# Patient Record
Sex: MALE | Race: BLACK OR AFRICAN AMERICAN | NOT HISPANIC OR LATINO | URBAN - METROPOLITAN AREA
[De-identification: names, ages, dates, MRNs, and addresses within clinical notes are randomized per-mention and may not be internally consistent; named-entity substitution may affect disease eponyms.]

---

## 2019-08-03 ENCOUNTER — INPATIENT (INPATIENT)
Facility: HOSPITAL | Age: 54
LOS: 33 days | Discharge: HOME | End: 2019-09-06
Attending: LEGAL MEDICINE | Admitting: LEGAL MEDICINE
Payer: MEDICAID

## 2019-08-03 VITALS
OXYGEN SATURATION: 96 % | SYSTOLIC BLOOD PRESSURE: 145 MMHG | TEMPERATURE: 98 F | HEART RATE: 85 BPM | RESPIRATION RATE: 16 BRPM | DIASTOLIC BLOOD PRESSURE: 94 MMHG

## 2019-08-03 NOTE — ED PROVIDER NOTE - MUSCULOSKELETAL MINIMAL EXAM
+diffuse back tenderness. L5-S1 weakness. Plantar flexion weakness. Sensations intact. 2+ pulses b/l.

## 2019-08-03 NOTE — ED ADULT TRIAGE NOTE - CHIEF COMPLAINT QUOTE
Pt BIBEMS [facility] from Home, c./o Lower Back pain for 5 days radiates to  Right leg, worsening. Dx with Sciatica, not on any pain medication. Taking OTC w/o relief. PMHx HTN on Vasotec

## 2019-08-03 NOTE — ED PROVIDER NOTE - OBJECTIVE STATEMENT
55 y/o M p/w back pain. Pt has a history of hypertension and chronic sciatica pain, now worsened over the last 3 months, more so over the last 3 days. Pt states pain in localized to lower back and radiates down R leg. Pt reports some residual R foot weakness in the past. Denies any bowel incontinence, urinary retention , erectile dysfunction, fever, chills, or acute weakness. Pt has been using ibuprofen 800 w/ minimal effect (Last used yesterday).

## 2019-08-03 NOTE — ED PROVIDER NOTE - CHPI ED SYMPTOMS NEG
No erectile dysfunction, no fever, no chills, no acute weakness/no bladder dysfunction/no bowel dysfunction

## 2019-08-03 NOTE — ED PROVIDER NOTE - PHYSICAL EXAMINATION
Neuro exam: +diffuse LBP, sensory intact, decrease rt plant flexion (pt states chronic); Sphincter tone/sensory  normal (Chaperone: ED Tech Aragon); reflexes +2 bilateral

## 2019-08-03 NOTE — ED PROVIDER NOTE - PROGRESS NOTE DETAILS
Bri: patient reports only, slight improvement. Lab tests pending, additional analgesics to be given and patient reevaluated. Patient signed out to overnight team. Patient advised. AK: Still has severe pain, unwilling to walk due to pain. WIll admit for pain control. Patient informed that he is being admitted for pain control and may not get an MRI inpatient. Patient understands and wants to be admitted.

## 2019-08-04 DIAGNOSIS — Z98.890 OTHER SPECIFIED POSTPROCEDURAL STATES: Chronic | ICD-10-CM

## 2019-08-04 DIAGNOSIS — F11.20 OPIOID DEPENDENCE, UNCOMPLICATED: ICD-10-CM

## 2019-08-04 DIAGNOSIS — E44.0 MODERATE PROTEIN-CALORIE MALNUTRITION: ICD-10-CM

## 2019-08-04 DIAGNOSIS — M54.30 SCIATICA, UNSPECIFIED SIDE: ICD-10-CM

## 2019-08-04 DIAGNOSIS — M54.41 LUMBAGO WITH SCIATICA, RIGHT SIDE: ICD-10-CM

## 2019-08-04 LAB
ALBUMIN SERPL ELPH-MCNC: 3 G/DL — LOW (ref 3.3–5)
ALP SERPL-CCNC: 66 U/L — SIGNIFICANT CHANGE UP (ref 40–120)
ALT FLD-CCNC: 39 U/L — SIGNIFICANT CHANGE UP (ref 4–41)
ANION GAP SERPL CALC-SCNC: 11 MMO/L — SIGNIFICANT CHANGE UP (ref 7–14)
AST SERPL-CCNC: 77 U/L — HIGH (ref 4–40)
BILIRUB SERPL-MCNC: 0.7 MG/DL — SIGNIFICANT CHANGE UP (ref 0.2–1.2)
BUN SERPL-MCNC: 18 MG/DL — SIGNIFICANT CHANGE UP (ref 7–23)
CALCIUM SERPL-MCNC: 8.7 MG/DL — SIGNIFICANT CHANGE UP (ref 8.4–10.5)
CHLORIDE SERPL-SCNC: 100 MMOL/L — SIGNIFICANT CHANGE UP (ref 98–107)
CO2 SERPL-SCNC: 22 MMOL/L — SIGNIFICANT CHANGE UP (ref 22–31)
CREAT SERPL-MCNC: 0.69 MG/DL — SIGNIFICANT CHANGE UP (ref 0.5–1.3)
CRP SERPL-MCNC: 92.8 MG/L — HIGH
ERYTHROCYTE [SEDIMENTATION RATE] IN BLOOD: 27 MM/HR — HIGH (ref 1–15)
GLUCOSE SERPL-MCNC: 103 MG/DL — HIGH (ref 70–99)
HCT VFR BLD CALC: 48.6 % — SIGNIFICANT CHANGE UP (ref 39–50)
HGB BLD-MCNC: 16.1 G/DL — SIGNIFICANT CHANGE UP (ref 13–17)
MCHC RBC-ENTMCNC: 30 PG — SIGNIFICANT CHANGE UP (ref 27–34)
MCHC RBC-ENTMCNC: 33.1 % — SIGNIFICANT CHANGE UP (ref 32–36)
MCV RBC AUTO: 90.7 FL — SIGNIFICANT CHANGE UP (ref 80–100)
NRBC # FLD: 0 K/UL — SIGNIFICANT CHANGE UP (ref 0–0)
PLATELET # BLD AUTO: 159 K/UL — SIGNIFICANT CHANGE UP (ref 150–400)
PMV BLD: 10.2 FL — SIGNIFICANT CHANGE UP (ref 7–13)
POTASSIUM SERPL-MCNC: 4.6 MMOL/L — SIGNIFICANT CHANGE UP (ref 3.5–5.3)
POTASSIUM SERPL-SCNC: 4.6 MMOL/L — SIGNIFICANT CHANGE UP (ref 3.5–5.3)
PROT SERPL-MCNC: 6.6 G/DL — SIGNIFICANT CHANGE UP (ref 6–8.3)
RBC # BLD: 5.36 M/UL — SIGNIFICANT CHANGE UP (ref 4.2–5.8)
RBC # FLD: 13.9 % — SIGNIFICANT CHANGE UP (ref 10.3–14.5)
SODIUM SERPL-SCNC: 133 MMOL/L — LOW (ref 135–145)
WBC # BLD: 6.35 K/UL — SIGNIFICANT CHANGE UP (ref 3.8–10.5)
WBC # FLD AUTO: 6.35 K/UL — SIGNIFICANT CHANGE UP (ref 3.8–10.5)

## 2019-08-04 PROCEDURE — 99221 1ST HOSP IP/OBS SF/LOW 40: CPT | Mod: AI

## 2019-08-04 RX ORDER — TRAMADOL HYDROCHLORIDE 50 MG/1
50 TABLET ORAL EVERY 6 HOURS
Refills: 0 | Status: DISCONTINUED | OUTPATIENT
Start: 2019-08-04 | End: 2019-08-06

## 2019-08-04 RX ORDER — KETOROLAC TROMETHAMINE 30 MG/ML
15 SYRINGE (ML) INJECTION EVERY 6 HOURS
Refills: 0 | Status: DISCONTINUED | OUTPATIENT
Start: 2019-08-04 | End: 2019-08-06

## 2019-08-04 RX ORDER — DIAZEPAM 5 MG
5 TABLET ORAL ONCE
Refills: 0 | Status: DISCONTINUED | OUTPATIENT
Start: 2019-08-04 | End: 2019-08-04

## 2019-08-04 RX ORDER — METHADONE HYDROCHLORIDE 40 MG/1
10 TABLET ORAL DAILY
Refills: 0 | Status: DISCONTINUED | OUTPATIENT
Start: 2019-08-04 | End: 2019-08-06

## 2019-08-04 RX ORDER — OXYCODONE HYDROCHLORIDE 5 MG/1
5 TABLET ORAL ONCE
Refills: 0 | Status: DISCONTINUED | OUTPATIENT
Start: 2019-08-04 | End: 2019-08-04

## 2019-08-04 RX ORDER — CYCLOBENZAPRINE HYDROCHLORIDE 10 MG/1
10 TABLET, FILM COATED ORAL THREE TIMES A DAY
Refills: 0 | Status: DISCONTINUED | OUTPATIENT
Start: 2019-08-04 | End: 2019-08-06

## 2019-08-04 RX ORDER — LIDOCAINE 4 G/100G
1 CREAM TOPICAL ONCE
Refills: 0 | Status: COMPLETED | OUTPATIENT
Start: 2019-08-04 | End: 2019-08-04

## 2019-08-04 RX ORDER — KETOROLAC TROMETHAMINE 30 MG/ML
15 SYRINGE (ML) INJECTION ONCE
Refills: 0 | Status: DISCONTINUED | OUTPATIENT
Start: 2019-08-04 | End: 2019-08-04

## 2019-08-04 RX ORDER — MORPHINE SULFATE 50 MG/1
6 CAPSULE, EXTENDED RELEASE ORAL ONCE
Refills: 0 | Status: DISCONTINUED | OUTPATIENT
Start: 2019-08-04 | End: 2019-08-04

## 2019-08-04 RX ORDER — LIDOCAINE 4 G/100G
1 CREAM TOPICAL DAILY
Refills: 0 | Status: DISCONTINUED | OUTPATIENT
Start: 2019-08-04 | End: 2019-09-06

## 2019-08-04 RX ORDER — ACETAMINOPHEN 500 MG
650 TABLET ORAL EVERY 6 HOURS
Refills: 0 | Status: DISCONTINUED | OUTPATIENT
Start: 2019-08-04 | End: 2019-08-12

## 2019-08-04 RX ORDER — MORPHINE SULFATE 50 MG/1
2 CAPSULE, EXTENDED RELEASE ORAL EVERY 4 HOURS
Refills: 0 | Status: DISCONTINUED | OUTPATIENT
Start: 2019-08-04 | End: 2019-08-04

## 2019-08-04 RX ADMIN — LIDOCAINE 1 PATCH: 4 CREAM TOPICAL at 11:03

## 2019-08-04 RX ADMIN — Medication 0.1 MILLIGRAM(S): at 23:18

## 2019-08-04 RX ADMIN — Medication 15 MILLIGRAM(S): at 17:07

## 2019-08-04 RX ADMIN — MORPHINE SULFATE 2 MILLIGRAM(S): 50 CAPSULE, EXTENDED RELEASE ORAL at 08:38

## 2019-08-04 RX ADMIN — Medication 15 MILLIGRAM(S): at 01:50

## 2019-08-04 RX ADMIN — OXYCODONE HYDROCHLORIDE 5 MILLIGRAM(S): 5 TABLET ORAL at 07:00

## 2019-08-04 RX ADMIN — Medication 5 MILLIGRAM(S): at 04:55

## 2019-08-04 RX ADMIN — TRAMADOL HYDROCHLORIDE 50 MILLIGRAM(S): 50 TABLET ORAL at 23:30

## 2019-08-04 RX ADMIN — LIDOCAINE 1 PATCH: 4 CREAM TOPICAL at 11:07

## 2019-08-04 RX ADMIN — LIDOCAINE 1 PATCH: 4 CREAM TOPICAL at 16:51

## 2019-08-04 RX ADMIN — Medication 15 MILLIGRAM(S): at 11:04

## 2019-08-04 RX ADMIN — MORPHINE SULFATE 6 MILLIGRAM(S): 50 CAPSULE, EXTENDED RELEASE ORAL at 07:50

## 2019-08-04 RX ADMIN — LIDOCAINE 1 PATCH: 4 CREAM TOPICAL at 03:25

## 2019-08-04 RX ADMIN — OXYCODONE HYDROCHLORIDE 5 MILLIGRAM(S): 5 TABLET ORAL at 04:55

## 2019-08-04 RX ADMIN — Medication 15 MILLIGRAM(S): at 23:18

## 2019-08-04 RX ADMIN — CYCLOBENZAPRINE HYDROCHLORIDE 10 MILLIGRAM(S): 10 TABLET, FILM COATED ORAL at 10:23

## 2019-08-04 RX ADMIN — MORPHINE SULFATE 6 MILLIGRAM(S): 50 CAPSULE, EXTENDED RELEASE ORAL at 07:04

## 2019-08-04 RX ADMIN — Medication 5 MILLIGRAM(S): at 00:59

## 2019-08-04 RX ADMIN — Medication 15 MILLIGRAM(S): at 11:20

## 2019-08-04 RX ADMIN — METHADONE HYDROCHLORIDE 10 MILLIGRAM(S): 40 TABLET ORAL at 11:06

## 2019-08-04 RX ADMIN — TRAMADOL HYDROCHLORIDE 50 MILLIGRAM(S): 50 TABLET ORAL at 22:24

## 2019-08-04 RX ADMIN — LIDOCAINE 1 PATCH: 4 CREAM TOPICAL at 00:58

## 2019-08-04 RX ADMIN — Medication 15 MILLIGRAM(S): at 23:59

## 2019-08-04 NOTE — H&P ADULT - HISTORY OF PRESENT ILLNESS
Pt is a 55 yo male with chronic back pain, kidney cyst a/w acute on chronic back pain with sciatica with difficulty ambulating due to pain.  Pt reports a similar episode approx 15 years ago.  Upon further questioning about that epiosde, pt states he was told it was due to the deposition of the impurities of the heroin he used.  States they put a needle in his back, unclear if he had abx during that time.  Pt states he is still using heroin every 3-4 days, about 4 bags at a time.  Reports he is also feeling a little "sick" and has not eaten in 4 days due to using and his pain; currently is hungry.  Pt states he had a neg HIV test about 3 months ago and states he uses clean needles every time he injects.    Pt denies fever, chills, incontinence bowel or bladder

## 2019-08-04 NOTE — ED ADULT NURSE NOTE - DRUG PRE-SCREENING (DAST -1)
Please let mom know that it sounds like the diaper rash may be a yeast rash. I sent a prescription for nystatin cream to the pharmacy. They should apply a small amount to the affected areas with each diaper change until the rash is gone. If the rash is not better in one week, or worsens, or he gets additional symptoms I would need to see him. It's important that they try to keep his diapers as dry as possible and use good handwashing.   Statement Selected

## 2019-08-04 NOTE — ED ADULT NURSE NOTE - OBJECTIVE STATEMENT
Report received from night shift RN Monica. Pt laying in stretcher on right side , sleeping . Awoken via verbal stimuli. C/o 10/10 pain to low back radiating down right leg. Pt given Morhine 6mg IVP at 0704. Pt states he was able to sleep after receiving medication. Pt grimacing while repositioning self in stretcher. ADS paged.

## 2019-08-04 NOTE — H&P ADULT - PROBLEM SELECTOR PLAN 2
avoid narcotics if possible  pt appears comfortable from pain perspective, feeling "sick"  methadone 10mg daily started x 3 days to assist with w/d sx  currently no evidence of HTN or tachy at this time, if starts cant give clonidine for sx relief avoid narcotics if possible  pt appears comfortable from pain perspective, feeling "sick"  methadone 10mg daily started x 3 days to assist with w/d sx  currently no evidence of HTN or tachy at this time, if starts cant give clonidine for sx relief  consider psych eval to assist if withdrawal sx unable to be managed with above avoid narcotics if possible  pt appears comfortable from pain perspective, feeling "sick"  methadone 10mg daily started x 3 days to assist with w/d sx  currently no evidence of HTN or tachy at this time, if starts cant give clonidine for sx relief  consider psych eval to assist if withdrawal sx unable to be managed with above  unclear at this time if pt is ready/willing to stop using; this can be pursued during admission

## 2019-08-04 NOTE — ED ADULT NURSE REASSESSMENT NOTE - NS ED NURSE REASSESS COMMENT FT1
PT was complaining of pain, medicated as per ordered, had rated pain as 9/10.
Pt in NAD, endorsing pain relief from morphine, VSS, will continue to monitor.

## 2019-08-04 NOTE — ED ADULT NURSE NOTE - NSIMPLEMENTINTERV_GEN_ALL_ED
Implemented All Fall Risk Interventions:  Ethel to call system. Call bell, personal items and telephone within reach. Instruct patient to call for assistance. Room bathroom lighting operational. Non-slip footwear when patient is off stretcher. Physically safe environment: no spills, clutter or unnecessary equipment. Stretcher in lowest position, wheels locked, appropriate side rails in place. Provide visual cue, wrist band, yellow gown, etc. Monitor gait and stability. Monitor for mental status changes and reorient to person, place, and time. Review medications for side effects contributing to fall risk. Reinforce activity limits and safety measures with patient and family.

## 2019-08-04 NOTE — H&P ADULT - PROBLEM SELECTOR PLAN 1
unclear etiology at this time; poss OM/infectious source vs disc herniation  CT ordered with and without contrast to eval for above  monitor for signs of infection; if febrile, pan culture and observe off abx as long as hemodynamically stable;   will check ESR, CRP  PT eval

## 2019-08-05 LAB
ALBUMIN SERPL ELPH-MCNC: 3.1 G/DL — LOW (ref 3.3–5)
ALP SERPL-CCNC: 63 U/L — SIGNIFICANT CHANGE UP (ref 40–120)
ALT FLD-CCNC: 48 U/L — HIGH (ref 4–41)
ANION GAP SERPL CALC-SCNC: 13 MMO/L — SIGNIFICANT CHANGE UP (ref 7–14)
AST SERPL-CCNC: 82 U/L — HIGH (ref 4–40)
BILIRUB SERPL-MCNC: 0.8 MG/DL — SIGNIFICANT CHANGE UP (ref 0.2–1.2)
BUN SERPL-MCNC: 23 MG/DL — SIGNIFICANT CHANGE UP (ref 7–23)
CALCIUM SERPL-MCNC: 9.4 MG/DL — SIGNIFICANT CHANGE UP (ref 8.4–10.5)
CHLORIDE SERPL-SCNC: 100 MMOL/L — SIGNIFICANT CHANGE UP (ref 98–107)
CO2 SERPL-SCNC: 25 MMOL/L — SIGNIFICANT CHANGE UP (ref 22–31)
CREAT SERPL-MCNC: 0.77 MG/DL — SIGNIFICANT CHANGE UP (ref 0.5–1.3)
GLUCOSE SERPL-MCNC: 97 MG/DL — SIGNIFICANT CHANGE UP (ref 70–99)
HCT VFR BLD CALC: 49.2 % — SIGNIFICANT CHANGE UP (ref 39–50)
HCV AB S/CO SERPL IA: 9.89 S/CO — HIGH (ref 0–0.99)
HCV AB SERPL-IMP: REACTIVE — SIGNIFICANT CHANGE UP
HGB BLD-MCNC: 16.4 G/DL — SIGNIFICANT CHANGE UP (ref 13–17)
MCHC RBC-ENTMCNC: 30.4 PG — SIGNIFICANT CHANGE UP (ref 27–34)
MCHC RBC-ENTMCNC: 33.3 % — SIGNIFICANT CHANGE UP (ref 32–36)
MCV RBC AUTO: 91.1 FL — SIGNIFICANT CHANGE UP (ref 80–100)
NRBC # FLD: 0 K/UL — SIGNIFICANT CHANGE UP (ref 0–0)
PLATELET # BLD AUTO: 179 K/UL — SIGNIFICANT CHANGE UP (ref 150–400)
PMV BLD: 11.3 FL — SIGNIFICANT CHANGE UP (ref 7–13)
POTASSIUM SERPL-MCNC: 4 MMOL/L — SIGNIFICANT CHANGE UP (ref 3.5–5.3)
POTASSIUM SERPL-SCNC: 4 MMOL/L — SIGNIFICANT CHANGE UP (ref 3.5–5.3)
PROT SERPL-MCNC: 6.8 G/DL — SIGNIFICANT CHANGE UP (ref 6–8.3)
RBC # BLD: 5.4 M/UL — SIGNIFICANT CHANGE UP (ref 4.2–5.8)
RBC # FLD: 13.9 % — SIGNIFICANT CHANGE UP (ref 10.3–14.5)
SODIUM SERPL-SCNC: 138 MMOL/L — SIGNIFICANT CHANGE UP (ref 135–145)
WBC # BLD: 5.36 K/UL — SIGNIFICANT CHANGE UP (ref 3.8–10.5)
WBC # FLD AUTO: 5.36 K/UL — SIGNIFICANT CHANGE UP (ref 3.8–10.5)

## 2019-08-05 PROCEDURE — 90792 PSYCH DIAG EVAL W/MED SRVCS: CPT

## 2019-08-05 PROCEDURE — 72158 MRI LUMBAR SPINE W/O & W/DYE: CPT | Mod: 26

## 2019-08-05 RX ORDER — KETOROLAC TROMETHAMINE 30 MG/ML
15 SYRINGE (ML) INJECTION ONCE
Refills: 0 | Status: DISCONTINUED | OUTPATIENT
Start: 2019-08-05 | End: 2019-08-05

## 2019-08-05 RX ORDER — ACETAMINOPHEN 500 MG
1000 TABLET ORAL ONCE
Refills: 0 | Status: COMPLETED | OUTPATIENT
Start: 2019-08-05 | End: 2019-08-05

## 2019-08-05 RX ORDER — HYDROXYZINE HCL 10 MG
50 TABLET ORAL EVERY 8 HOURS
Refills: 0 | Status: DISCONTINUED | OUTPATIENT
Start: 2019-08-05 | End: 2019-09-06

## 2019-08-05 RX ORDER — ACETAMINOPHEN 500 MG
1000 TABLET ORAL ONCE
Refills: 0 | Status: DISCONTINUED | OUTPATIENT
Start: 2019-08-05 | End: 2019-08-05

## 2019-08-05 RX ADMIN — LIDOCAINE 1 PATCH: 4 CREAM TOPICAL at 00:00

## 2019-08-05 RX ADMIN — Medication 15 MILLIGRAM(S): at 11:10

## 2019-08-05 RX ADMIN — Medication 15 MILLIGRAM(S): at 21:36

## 2019-08-05 RX ADMIN — Medication 15 MILLIGRAM(S): at 22:00

## 2019-08-05 RX ADMIN — CYCLOBENZAPRINE HYDROCHLORIDE 10 MILLIGRAM(S): 10 TABLET, FILM COATED ORAL at 23:10

## 2019-08-05 RX ADMIN — LIDOCAINE 1 PATCH: 4 CREAM TOPICAL at 19:00

## 2019-08-05 RX ADMIN — Medication 15 MILLIGRAM(S): at 11:30

## 2019-08-05 RX ADMIN — Medication 15 MILLIGRAM(S): at 06:55

## 2019-08-05 RX ADMIN — Medication 15 MILLIGRAM(S): at 16:22

## 2019-08-05 RX ADMIN — Medication 15 MILLIGRAM(S): at 18:39

## 2019-08-05 RX ADMIN — Medication 15 MILLIGRAM(S): at 18:45

## 2019-08-05 RX ADMIN — TRAMADOL HYDROCHLORIDE 50 MILLIGRAM(S): 50 TABLET ORAL at 23:09

## 2019-08-05 RX ADMIN — Medication 1000 MILLIGRAM(S): at 02:00

## 2019-08-05 RX ADMIN — METHADONE HYDROCHLORIDE 10 MILLIGRAM(S): 40 TABLET ORAL at 11:09

## 2019-08-05 RX ADMIN — TRAMADOL HYDROCHLORIDE 50 MILLIGRAM(S): 50 TABLET ORAL at 23:50

## 2019-08-05 RX ADMIN — Medication 15 MILLIGRAM(S): at 19:00

## 2019-08-05 RX ADMIN — CYCLOBENZAPRINE HYDROCHLORIDE 10 MILLIGRAM(S): 10 TABLET, FILM COATED ORAL at 01:41

## 2019-08-05 RX ADMIN — Medication 15 MILLIGRAM(S): at 07:25

## 2019-08-05 RX ADMIN — LIDOCAINE 1 PATCH: 4 CREAM TOPICAL at 11:09

## 2019-08-05 RX ADMIN — TRAMADOL HYDROCHLORIDE 50 MILLIGRAM(S): 50 TABLET ORAL at 07:00

## 2019-08-05 RX ADMIN — Medication 400 MILLIGRAM(S): at 01:42

## 2019-08-05 RX ADMIN — TRAMADOL HYDROCHLORIDE 50 MILLIGRAM(S): 50 TABLET ORAL at 06:16

## 2019-08-05 NOTE — BEHAVIORAL HEALTH ASSESSMENT NOTE - NSBHCONSULTFOLLOWAFTERCARE_PSY_A_CORE FT
[facility] (dual diagnosis/substance disorder outpatient) [facility] [phone]    [facility] [phone]

## 2019-08-05 NOTE — BEHAVIORAL HEALTH ASSESSMENT NOTE - RISK ASSESSMENT
Patient is chronic heroin user. He denies SI/HI. Not overtly psychotic, manic, or depressed. His chronic risk of harm is moderate given his use, however his imminent risk of harm is low as he denies SI, is future oriented, has supports in place.

## 2019-08-05 NOTE — BEHAVIORAL HEALTH ASSESSMENT NOTE - NSBHCHARTREVIEWLAB_PSY_A_CORE FT
16.4   5.36  )-----------( 179      ( 05 Aug 2019 05:25 )             49.2     08-05    138  |  100  |  23  ----------------------------<  97  4.0   |  25  |  0.77    Ca    9.4      05 Aug 2019 05:25    TPro  6.8  /  Alb  3.1<L>  /  TBili  0.8  /  DBili  x   /  AST  82<H>  /  ALT  48<H>  /  AlkPhos  63  08-05

## 2019-08-05 NOTE — BEHAVIORAL HEALTH ASSESSMENT NOTE - SUMMARY
54 year old male, domiciled, unemployed with PPH significant for heroin dependence and PMH significant for chronic back pain, kidney cyst a/w acute on chronic back pain with sciatica with difficulty ambulating due to pain.   Psych consulted for heroin dependence. Of note, patien received methadone 10mg yesterday and today. He also received one dose of clonidine.    Patient appears to be in mild withdrawal.  [] can use methadone 10mg qAM today and tomorrow, then consider tapering to 5mg daily then stop  [] can use clonidine 0.1mg BID prn if not contraindicated from bloodpressure/HR stand point    Pain:  [] defer to primary team    Avoid benzos    If anxious- can consider hydroxyzine 50mg q8h prn

## 2019-08-05 NOTE — BEHAVIORAL HEALTH ASSESSMENT NOTE - HPI (INCLUDE ILLNESS QUALITY, SEVERITY, DURATION, TIMING, CONTEXT, MODIFYING FACTORS, ASSOCIATED SIGNS AND SYMPTOMS)
54 year old male, domiciled, unemployed with PPH significant for heroin dependence and PMH significant for chronic back pain, kidney cyst a/w acute on chronic back pain with sciatica with difficulty ambulating due to pain.   Psych consulted for heroin dependence. Of note, feli received methadone 10mg yesterday and today. He also received one dose of clonidine.    Patient seen this AM. States that he is in pain due to his sciatica and also feels like he is in withdrawal. States that he uses 5 bags of heroin per day. Says that he snorts and injects. He feels like he is nauseated, said that he vomited and that he is having loose stools. Notes some pain just in the back. Do not appreciate any dilated pupils or piloerection. COWS score ~ 6.    He states that he does not want inpatient rehab for heroin dependence.

## 2019-08-05 NOTE — PROGRESS NOTE ADULT - SUBJECTIVE AND OBJECTIVE BOX
MARCIO LUZ  54y  Male      Patient is a 54y old  Male who presents with a chief complaint of back pain and sciatica (04 Aug 2019 09:08)  Above noted.Reported back pain-mod-severe?-chronic.no sob,no cp,no fever,no cough    REVIEW OF SYSTEMS:  as above      INTERVAL HPI/OVERNIGHT EVENTS:  T(C): 36.6 (08-05-19 @ 16:21), Max: 37.5 (08-04-19 @ 22:15)  HR: 65 (08-05-19 @ 18:40) (63 - 68)  BP: 144/97 (08-05-19 @ 18:40) (142/84 - 146/97)  RR: 19 (08-05-19 @ 16:21) (16 - 19)  SpO2: 100% (08-05-19 @ 16:21) (98% - 100%)  Wt(kg): --  I&O's Summary    T(C): 36.6 (08-05-19 @ 16:21), Max: 37.5 (08-04-19 @ 22:15)  HR: 65 (08-05-19 @ 18:40) (63 - 68)  BP: 144/97 (08-05-19 @ 18:40) (142/84 - 146/97)  RR: 19 (08-05-19 @ 16:21) (16 - 19)  SpO2: 100% (08-05-19 @ 16:21) (98% - 100%)  Wt(kg): --Vital Signs Last 24 Hrs  T(C): 36.6 (05 Aug 2019 16:21), Max: 37.5 (04 Aug 2019 22:15)  T(F): 97.8 (05 Aug 2019 16:21), Max: 99.5 (04 Aug 2019 22:15)  HR: 65 (05 Aug 2019 18:40) (63 - 68)  BP: 144/97 (05 Aug 2019 18:40) (142/84 - 146/97)  BP(mean): --  RR: 19 (05 Aug 2019 16:21) (16 - 19)  SpO2: 100% (05 Aug 2019 16:21) (98% - 100%)    LABS:                        16.4   5.36  )-----------( 179      ( 05 Aug 2019 05:25 )             49.2     08-05    138  |  100  |  23  ----------------------------<  97  4.0   |  25  |  0.77    Ca    9.4      05 Aug 2019 05:25    TPro  6.8  /  Alb  3.1<L>  /  TBili  0.8  /  DBili  x   /  AST  82<H>  /  ALT  48<H>  /  AlkPhos  63  08-05        CAPILLARY BLOOD GLUCOSE                PAST MEDICAL & SURGICAL HISTORY:  Sciatica  S/P ORIF (open reduction internal fixation) fracture      MEDICATIONS  (STANDING):  ketorolac   Injectable 15 milliGRAM(s) IV Push once  ketorolac   Injectable 15 milliGRAM(s) IV Push every 6 hours  lidocaine   Patch 1 Patch Transdermal daily  methadone    Tablet 10 milliGRAM(s) Oral daily    MEDICATIONS  (PRN):  acetaminophen   Tablet .. 650 milliGRAM(s) Oral every 6 hours PRN Temp greater or equal to 38C (100.4F), Mild Pain (1 - 3)  cloNIDine 0.1 milliGRAM(s) Oral two times a day PRN withdrawl symptoms  cyclobenzaprine 10 milliGRAM(s) Oral three times a day PRN Muscle Spasm  hydrOXYzine hydrochloride 50 milliGRAM(s) Oral every 8 hours PRN Anxiety  traMADol 50 milliGRAM(s) Oral every 6 hours PRN Severe Pain (7 - 10)        RADIOLOGY & ADDITIONAL TESTS:    Imaging Personally Reviewed:  [ ] YES  [ ] NO    Consultant(s) Notes Reviewed:  [x ] YES  [ ] NO    PHYSICAL EXAM:  GENERAL: NAD, well-groomed, well-developed  HEAD:  Atraumatic, Normocephalic  EYES: EOMI, PERRLA, conjunctiva and sclera clear  ENMT: No tonsillar erythema, exudates, or enlargement; Moist mucous membranes, Good dentition, No lesions  NECK: Supple, No JVD, Normal thyroid  NERVOUS SYSTEM:  Alert & Oriented X3, nonfocal  CHEST/LUNG: Clear to percussion bilaterally; No rales, rhonchi, wheezing, or rubs  HEART: Regular rate and rhythm; No murmurs, rubs, or gallops  ABDOMEN: Soft, Nontender, Nondistended; Bowel sounds present  EXTREMITIES:  2+ Peripheral Pulses, No clubbing, cyanosis, or edema  LYMPH: No lymphadenopathy noted  SKIN: No rashes or lesions    Care Discussed with Consultants/Other Providers [ ] YES  [ ] NO      Code Status: [] Full Code [] DNR [] DNI [] Goals of Care:   Disposition: [] ICU [] Stroke Unit [] RCU []PCU []Floor [] Discharge Home         CECY Rodriguez.FACP

## 2019-08-05 NOTE — SBIRT NOTE ADULT - NSSBIRTDRGPASSREFTXDET_GEN_A_CORE
SW met with patient at bedside to complete SBIRT.  Pt is considered harmful use, SW provide listing of treatment facilities for substance use and provide opioids and heroin use effects on the body.

## 2019-08-05 NOTE — BEHAVIORAL HEALTH ASSESSMENT NOTE - NSBHCHARTREVIEWVS_PSY_A_CORE FT
Vital Signs Last 24 Hrs  T(C): 37.3 (05 Aug 2019 06:58), Max: 37.5 (04 Aug 2019 22:15)  T(F): 99.2 (05 Aug 2019 06:58), Max: 99.5 (04 Aug 2019 22:15)  HR: 64 (05 Aug 2019 11:08) (63 - 89)  BP: 146/97 (05 Aug 2019 11:08) (112/70 - 146/97)  BP(mean): --  RR: 16 (05 Aug 2019 06:58) (16 - 18)  SpO2: 98% (05 Aug 2019 06:58) (98% - 99%)

## 2019-08-06 LAB
ALBUMIN SERPL ELPH-MCNC: 2.9 G/DL — LOW (ref 3.3–5)
ALP SERPL-CCNC: 66 U/L — SIGNIFICANT CHANGE UP (ref 40–120)
ALT FLD-CCNC: 58 U/L — HIGH (ref 4–41)
ANION GAP SERPL CALC-SCNC: 11 MMO/L — SIGNIFICANT CHANGE UP (ref 7–14)
APPEARANCE UR: CLEAR — SIGNIFICANT CHANGE UP
AST SERPL-CCNC: 89 U/L — HIGH (ref 4–40)
BACTERIA # UR AUTO: NEGATIVE — SIGNIFICANT CHANGE UP
BASOPHILS # BLD AUTO: 0.03 K/UL — SIGNIFICANT CHANGE UP (ref 0–0.2)
BASOPHILS NFR BLD AUTO: 0.5 % — SIGNIFICANT CHANGE UP (ref 0–2)
BILIRUB SERPL-MCNC: 1 MG/DL — SIGNIFICANT CHANGE UP (ref 0.2–1.2)
BILIRUB UR-MCNC: SIGNIFICANT CHANGE UP
BLOOD UR QL VISUAL: NEGATIVE — SIGNIFICANT CHANGE UP
BUN SERPL-MCNC: 24 MG/DL — HIGH (ref 7–23)
CALCIUM SERPL-MCNC: 9.2 MG/DL — SIGNIFICANT CHANGE UP (ref 8.4–10.5)
CHLORIDE SERPL-SCNC: 100 MMOL/L — SIGNIFICANT CHANGE UP (ref 98–107)
CO2 SERPL-SCNC: 24 MMOL/L — SIGNIFICANT CHANGE UP (ref 22–31)
COLOR SPEC: SIGNIFICANT CHANGE UP
CREAT SERPL-MCNC: 0.82 MG/DL — SIGNIFICANT CHANGE UP (ref 0.5–1.3)
EOSINOPHIL # BLD AUTO: 0.02 K/UL — SIGNIFICANT CHANGE UP (ref 0–0.5)
EOSINOPHIL NFR BLD AUTO: 0.3 % — SIGNIFICANT CHANGE UP (ref 0–6)
GLUCOSE SERPL-MCNC: 132 MG/DL — HIGH (ref 70–99)
GLUCOSE UR-MCNC: NEGATIVE — SIGNIFICANT CHANGE UP
HCT VFR BLD CALC: 47.7 % — SIGNIFICANT CHANGE UP (ref 39–50)
HCV RNA FLD QL NAA+PROBE: SIGNIFICANT CHANGE UP
HCV RNA SPEC QL PROBE+SIG AMP: DETECTED — SIGNIFICANT CHANGE UP
HGB BLD-MCNC: 15.7 G/DL — SIGNIFICANT CHANGE UP (ref 13–17)
HYALINE CASTS # UR AUTO: NEGATIVE — SIGNIFICANT CHANGE UP
IMM GRANULOCYTES NFR BLD AUTO: 0.3 % — SIGNIFICANT CHANGE UP (ref 0–1.5)
KETONES UR-MCNC: NEGATIVE — SIGNIFICANT CHANGE UP
LEUKOCYTE ESTERASE UR-ACNC: NEGATIVE — SIGNIFICANT CHANGE UP
LYMPHOCYTES # BLD AUTO: 1.54 K/UL — SIGNIFICANT CHANGE UP (ref 1–3.3)
LYMPHOCYTES # BLD AUTO: 25.2 % — SIGNIFICANT CHANGE UP (ref 13–44)
MAGNESIUM SERPL-MCNC: 2 MG/DL — SIGNIFICANT CHANGE UP (ref 1.6–2.6)
MCHC RBC-ENTMCNC: 29.4 PG — SIGNIFICANT CHANGE UP (ref 27–34)
MCHC RBC-ENTMCNC: 32.9 % — SIGNIFICANT CHANGE UP (ref 32–36)
MCV RBC AUTO: 89.3 FL — SIGNIFICANT CHANGE UP (ref 80–100)
MONOCYTES # BLD AUTO: 0.71 K/UL — SIGNIFICANT CHANGE UP (ref 0–0.9)
MONOCYTES NFR BLD AUTO: 11.6 % — SIGNIFICANT CHANGE UP (ref 2–14)
NEUTROPHILS # BLD AUTO: 3.8 K/UL — SIGNIFICANT CHANGE UP (ref 1.8–7.4)
NEUTROPHILS NFR BLD AUTO: 62.1 % — SIGNIFICANT CHANGE UP (ref 43–77)
NITRITE UR-MCNC: NEGATIVE — SIGNIFICANT CHANGE UP
NRBC # FLD: 0 K/UL — SIGNIFICANT CHANGE UP (ref 0–0)
PH UR: 6.5 — SIGNIFICANT CHANGE UP (ref 5–8)
PHOSPHATE SERPL-MCNC: 3 MG/DL — SIGNIFICANT CHANGE UP (ref 2.5–4.5)
PLATELET # BLD AUTO: 193 K/UL — SIGNIFICANT CHANGE UP (ref 150–400)
PMV BLD: 10.5 FL — SIGNIFICANT CHANGE UP (ref 7–13)
POTASSIUM SERPL-MCNC: 3.9 MMOL/L — SIGNIFICANT CHANGE UP (ref 3.5–5.3)
POTASSIUM SERPL-SCNC: 3.9 MMOL/L — SIGNIFICANT CHANGE UP (ref 3.5–5.3)
PROT SERPL-MCNC: 6.7 G/DL — SIGNIFICANT CHANGE UP (ref 6–8.3)
PROT UR-MCNC: 30 — SIGNIFICANT CHANGE UP
RBC # BLD: 5.34 M/UL — SIGNIFICANT CHANGE UP (ref 4.2–5.8)
RBC # FLD: 13.5 % — SIGNIFICANT CHANGE UP (ref 10.3–14.5)
RBC CASTS # UR COMP ASSIST: SIGNIFICANT CHANGE UP (ref 0–?)
SODIUM SERPL-SCNC: 135 MMOL/L — SIGNIFICANT CHANGE UP (ref 135–145)
SP GR SPEC: 1.03 — SIGNIFICANT CHANGE UP (ref 1–1.04)
SQUAMOUS # UR AUTO: SIGNIFICANT CHANGE UP
URATE CRY FLD QL MICRO: SIGNIFICANT CHANGE UP (ref 0–0)
UROBILINOGEN FLD QL: HIGH
WBC # BLD: 6.12 K/UL — SIGNIFICANT CHANGE UP (ref 3.8–10.5)
WBC # FLD AUTO: 6.12 K/UL — SIGNIFICANT CHANGE UP (ref 3.8–10.5)
WBC UR QL: SIGNIFICANT CHANGE UP (ref 0–?)

## 2019-08-06 PROCEDURE — 99233 SBSQ HOSP IP/OBS HIGH 50: CPT

## 2019-08-06 RX ORDER — METHADONE HYDROCHLORIDE 40 MG/1
5 TABLET ORAL ONCE
Refills: 0 | Status: DISCONTINUED | OUTPATIENT
Start: 2019-08-07 | End: 2019-08-07

## 2019-08-06 RX ORDER — KETOROLAC TROMETHAMINE 30 MG/ML
30 SYRINGE (ML) INJECTION EVERY 6 HOURS
Refills: 0 | Status: DISCONTINUED | OUTPATIENT
Start: 2019-08-06 | End: 2019-08-08

## 2019-08-06 RX ORDER — TIZANIDINE 4 MG/1
2 TABLET ORAL EVERY 6 HOURS
Refills: 0 | Status: DISCONTINUED | OUTPATIENT
Start: 2019-08-06 | End: 2019-08-08

## 2019-08-06 RX ORDER — KETOROLAC TROMETHAMINE 30 MG/ML
30 SYRINGE (ML) INJECTION ONCE
Refills: 0 | Status: DISCONTINUED | OUTPATIENT
Start: 2019-08-06 | End: 2019-08-06

## 2019-08-06 RX ORDER — KETOROLAC TROMETHAMINE 30 MG/ML
15 SYRINGE (ML) INJECTION ONCE
Refills: 0 | Status: DISCONTINUED | OUTPATIENT
Start: 2019-08-06 | End: 2019-08-06

## 2019-08-06 RX ORDER — GABAPENTIN 400 MG/1
300 CAPSULE ORAL THREE TIMES A DAY
Refills: 0 | Status: DISCONTINUED | OUTPATIENT
Start: 2019-08-06 | End: 2019-08-13

## 2019-08-06 RX ADMIN — LIDOCAINE 1 PATCH: 4 CREAM TOPICAL at 19:00

## 2019-08-06 RX ADMIN — LIDOCAINE 1 PATCH: 4 CREAM TOPICAL at 11:44

## 2019-08-06 RX ADMIN — Medication 30 MILLIGRAM(S): at 23:46

## 2019-08-06 RX ADMIN — Medication 30 MILLIGRAM(S): at 19:25

## 2019-08-06 RX ADMIN — Medication 30 MILLIGRAM(S): at 12:31

## 2019-08-06 RX ADMIN — TRAMADOL HYDROCHLORIDE 50 MILLIGRAM(S): 50 TABLET ORAL at 07:15

## 2019-08-06 RX ADMIN — Medication 30 MILLIGRAM(S): at 00:10

## 2019-08-06 RX ADMIN — GABAPENTIN 300 MILLIGRAM(S): 400 CAPSULE ORAL at 22:27

## 2019-08-06 RX ADMIN — CYCLOBENZAPRINE HYDROCHLORIDE 10 MILLIGRAM(S): 10 TABLET, FILM COATED ORAL at 05:12

## 2019-08-06 RX ADMIN — Medication 0.1 MILLIGRAM(S): at 23:47

## 2019-08-06 RX ADMIN — Medication 30 MILLIGRAM(S): at 00:45

## 2019-08-06 RX ADMIN — METHADONE HYDROCHLORIDE 10 MILLIGRAM(S): 40 TABLET ORAL at 11:44

## 2019-08-06 RX ADMIN — TRAMADOL HYDROCHLORIDE 50 MILLIGRAM(S): 50 TABLET ORAL at 06:32

## 2019-08-06 RX ADMIN — LIDOCAINE 1 PATCH: 4 CREAM TOPICAL at 22:29

## 2019-08-06 RX ADMIN — Medication 30 MILLIGRAM(S): at 18:55

## 2019-08-06 RX ADMIN — Medication 15 MILLIGRAM(S): at 06:33

## 2019-08-06 RX ADMIN — TIZANIDINE 2 MILLIGRAM(S): 4 TABLET ORAL at 23:46

## 2019-08-06 RX ADMIN — TIZANIDINE 2 MILLIGRAM(S): 4 TABLET ORAL at 18:57

## 2019-08-06 RX ADMIN — Medication 30 MILLIGRAM(S): at 12:01

## 2019-08-06 RX ADMIN — Medication 15 MILLIGRAM(S): at 05:12

## 2019-08-06 NOTE — CONSULT NOTE ADULT - SUBJECTIVE AND OBJECTIVE BOX
Chief Complaint:    HPI:  Pt is a 55 yo male with chronic back pain, kidney cyst a/w acute on chronic back pain with sciatica with difficulty ambulating due to pain.  Pt reports a similar episode approx 15 years ago.  Upon further questioning about that epiosde, pt states he was told it was due to the deposition of the impurities of the heroin he used.  States they put a needle in his back, unclear if he had abx during that time.  Pt states he is still using heroin every 3-4 days, about 4 bags at a time.  Reports he is also feeling a little "sick" and has not eaten in 4 days due to using and his pain; currently is hungry.  Pt states he had a neg HIV test about 3 months ago and states he uses clean needles every time he injects.    Pt denies fever, chills, incontinence bowel or bladder (04 Aug 2019 09:08)      PAST MEDICAL & SURGICAL HISTORY:  Sciatica  S/P ORIF (open reduction internal fixation) fracture      FAMILY HISTORY:      SOCIAL HISTORY:  [ ] Denies Smoking, Alcohol, or Drug Use    Allergies    No Known Allergies    Intolerances        PAIN MEDICATIONS:  acetaminophen   Tablet .. 650 milliGRAM(s) Oral every 6 hours PRN  cyclobenzaprine 10 milliGRAM(s) Oral three times a day PRN  hydrOXYzine hydrochloride 50 milliGRAM(s) Oral every 8 hours PRN  traMADol 50 milliGRAM(s) Oral every 6 hours PRN      Heme:    Antibiotics:    Cardiovascular:  cloNIDine 0.1 milliGRAM(s) Oral two times a day PRN    GI:    Endocrine:    All Other Medications:  lidocaine   Patch 1 Patch Transdermal daily      REVIEW OF SYSTEMS:    CONSTITUTIONAL: No fever, weight loss, or fatigue  EYES: No eye pain, visual disturbances, or discharge  ENMT:  No difficulty hearing, tinnitus, vertigo; No sinus or throat pain  NECK: No pain or stiffness  BREASTS: No pain, masses, or nipple discharge  RESPIRATORY: No cough, wheezing, chills or hemoptysis; No shortness of breath  CARDIOVASCULAR: No chest pain, palpitations, dizziness, or leg swelling  GASTROINTESTINAL: No abdominal or epigastric pain. No nausea, vomiting, or hematemesis; No diarrhea or constipation. No melena or hematochezia.  GENITOURINARY: No dysuria, frequency, hematuria, or incontinence  NEUROLOGICAL: No headaches, memory loss, loss of strength, numbness, or tremors  SKIN: No itching, burning, rashes, or lesions   LYMPH NODES: No enlarged glands  ENDOCRINE: No heat or cold intolerance; No hair loss  MUSCULOSKELETAL: No joint pain or swelling; No muscle, back, or extremity pain  PSYCHIATRIC: No depression, anxiety, mood swings, or difficulty sleeping  HEME/LYMPH: No easy bruising, or bleeding gums  ALLERY AND IMMUNOLOGIC: No hives or eczema      Vital Signs Last 24 Hrs  T(C): 37.4 (06 Aug 2019 05:00), Max: 37.4 (06 Aug 2019 05:00)  T(F): 99.4 (06 Aug 2019 05:00), Max: 99.4 (06 Aug 2019 05:00)  HR: 72 (06 Aug 2019 05:00) (65 - 72)  BP: 140/100 (06 Aug 2019 05:00) (140/100 - 155/100)  BP(mean): --  RR: 18 (06 Aug 2019 05:00) (18 - 19)  SpO2: 100% (06 Aug 2019 05:00) (100% - 100%)    PAIN SCORE:         SCALE USED: (1-10 VNRS)             PHYSICAL EXAM:    GENERAL: NAD, well-groomed, well-developed  HEAD:  Atraumatic, Normocephalic  EYES: EOMI, PERRLA, conjunctiva and sclera clear  ENMT: No tonsillar erythema, exudates, or enlargement; Moist mucous membranes, Good dentition, No lesions  NECK: Supple, No JVD, Normal thyroid  NERVOUS SYSTEM:  Alert & Oriented X3, Good concentration; Motor Strength 5/5 B/L upper and lower extremities; DTRs 2+ intact and symmetric  CHEST/LUNG: Clear to percussion bilaterally; No rales, rhonchi, wheezing, or rubs  HEART: Regular rate and rhythm; No murmurs, rubs, or gallops  ABDOMEN: Soft, Nontender, Nondistended; Bowel sounds present  EXTREMITIES:  2+ Peripheral Pulses, No clubbing, cyanosis, or edema  LYMPH: No lymphadenopathy noted  SKIN: No rashes or lesions        LABS:                          15.7   6.12  )-----------( 193      ( 06 Aug 2019 04:35 )             47.7     08-06    135  |  100  |  24<H>  ----------------------------<  132<H>  3.9   |  24  |  0.82    Ca    9.2      06 Aug 2019 04:45  Phos  3.0     08-06  Mg     2.0     08-06    TPro  6.7  /  Alb  2.9<L>  /  TBili  1.0  /  DBili  x   /  AST  89<H>  /  ALT  58<H>  /  AlkPhos  66  08-06        Comments: Pt. complaining of pain in lower back and hip that radiates to lower right leg. Gets relief from toradol IV and states the flexeril doesn't really help. Difficulty getting up on his own. Pt. tolerating diet, answers all questions appropriately and maintains eye contact.         RECOMMENDATIONS  1. Add gabapentin 300mg TID  2. Add Tizanidine 2mg q6hrs standing for 2 days. D/C flexeril   3. Continue toradol IV 30mg q6hrs standing for 2 days   4. Consider PT consult for TENS therapy      [X ]  [place]  Reviewed and Copied to Chart

## 2019-08-06 NOTE — DISCHARGE NOTE PROVIDER - NSDCFUADDAPPT_GEN_ALL_CORE_FT
Follow-up with your chronic pain specialist   Follow-up with neurosurgery as outpatient   Follow-up with infectious disease as outpatient  Follow-up with gastrointestinal medicine for Hepatitis C as outpatient

## 2019-08-06 NOTE — DISCHARGE NOTE PROVIDER - HOSPITAL COURSE
Pt is a 55 yo male with h/o chronic back pain, active heroin user a/w acute on chronic back pain        Hospital Course    Acute right-sided low back pain with right-sided sciatica    - unclear etiology at this time    - poss OM/infectious source vs disc herniation    - MRI lumbar spine- Nonspecific abnormal signal of the L3 vertebral body that is hypointense on T1-weighted imaging and slightly enhancing. This may represent occult fracture though underlying lesion cannot entirely excluded. Continued close interval follow-up until resolution is recommended. Thepossibility of underlying infection cannot be entirely excluded as well though is less likely given the pattern of abnormal signal and enhancement.    -Neurosurgery followed and recommended---    - CT of LS revealed---    - PT eval----        Heroin dependence    - avoided narcotics     - methadone 10mg daily through 8/6, then 5 mg x1, then stop    -Psych & Pain management evaluated and rec----            Dispo- Pt is a 55 yo male with h/o chronic back pain, active heroin user a/w acute on chronic back pain        Hospital Course    Acute right-sided low back pain with right-sided sciatica    - unclear etiology at this time    - poss OM/infectious source vs disc herniation    - MRI lumbar spine- Nonspecific abnormal signal of the L3 vertebral body that is hypointense on T1-weighted imaging and slightly enhancing. This may represent occult fracture though underlying lesion cannot entirely excluded. Continued close interval follow-up until resolution is recommended. Thepossibility of underlying infection cannot be entirely excluded as well though is less likely given the pattern of abnormal signal and enhancement.    -Neurosurgery followed     - CT of LS revealed-- No fractures or abnormal lesion is seen involving the L3 vertebral body. Sclerotic changes are seen involving the S1 vertebral body.    - PT evaluated and recommended Rehab placement         Heroin dependence    - avoided narcotics     - Received methadone taper 10mg daily through 8/6, then 5 mg x1 on 8/7, then stopped    -Psych & Pain management evaluated and recommended----        Dispo-Rehab Pt is a 55 yo male with h/o chronic back pain, active heroin user a/w acute on chronic back pain        Acute right-sided low back pain with right-sided sciatica    - Poss OM/infectious source vs disc herniation    - MRI lumbar spine- Nonspecific abnormal signal of the L3 vertebral body that is hypointense on T1-weighted imaging and slightly enhancing. This may represent occult fracture though underlying lesion cannot entirely excluded. Continued close interval follow-up until resolution is recommended. Thepossibility of underlying infection cannot be entirely excluded as well though is less likely given the pattern of abnormal signal and enhancement.    - Neurosurgery followed no intervention warranted     - Infectious disease consulted as patient noted ot be bacteremic    - Nafcillin course until 10/7    - CT of LS revealed: No fractures or abnormal lesion is seen involving the L3 vertebral body. Sclerotic changes are seen involving the S1 vertebral body.    - PT evaluated and recommended Rehab placement     - Patient started on Oxycontin standing increased to 20mg BID and Percocet PRN 10mg q 6 hours     MSSA bacteremia and L3 osteo     active heroine use    repeat cx 8/12 negative     TTE negative    repeat MRI 8/19 with progressed enhancement and edema of L3 with perivertebral soft tissue swelling/enhancement, s/o evolving osteo, no epidural abscess    lethargy with ?drug abuse from family visits, now very agitated that he is not getting proper pain management and wants to be discharged        Heroin dependence    - avoided narcotics     - Received methadone taper 10mg daily through 8/6, then 5 mg x1 on 8/7, then stopped    - Psych & Pain management evaluated     - Patient started on Oxycontin standing increased to 20mg BID and Percocet PRN 10mg q 6 hours         ** Patient wants to leave AMA - As per pain management and psychiatry not recommending to send on any narcotics upon discharge if leaving AMA - Explained the risk of withdrawal and N/V/D, shaking, dizziness, may ensue - Spoke to infectious disease who does not clear patient for DC, so if leaving AMA can continue ABX course with Keflex and Doxy to complete 8 - week course - Sent to [facility] - Patient understands the risks that oral ABX not as effective as IV and that the infection may recur Pt is a 53 yo male with h/o chronic back pain, active heroin user a/w acute on chronic back pain        Acute right-sided low back pain in the setting of OM on L3 for which neurosurgery and infectious disease consulted and patient started on IV antibiotics     - MRI lumbar spine- Nonspecific abnormal signal of the L3 vertebral body that is hypointense on T1-weighted imaging and slightly enhancing. This may represent occult fracture though underlying lesion cannot entirely excluded. Continued close interval follow-up until resolution is recommended. Thepossibility of underlying infection cannot be entirely excluded as well though is less likely given the pattern of abnormal signal and enhancement.    - Neurosurgery followed no intervention warranted     - Infectious disease consulted as patient noted ot be bacteremic    - Nafcillin course until 10/7    - CT of LS revealed: No fractures or abnormal lesion is seen involving the L3 vertebral body. Sclerotic changes are seen involving the S1 vertebral body.    - PT evaluated and recommended Rehab placement     - Patient started on Oxycontin standing increased to 20mg BID and Percocet PRN 10mg q 6 hours     MSSA bacteremia and L3 osteo     active heroine use    repeat cx 8/12 negative     TTE negative    repeat MRI 8/19 with progressed enhancement and edema of L3 with perivertebral soft tissue swelling/enhancement, s/o evolving osteo, no epidural abscess    lethargy with ?drug abuse from family visits, now very agitated that he is not getting proper pain management and wants to be discharged        Heroin dependence    - avoided narcotics     - Received methadone taper 10mg daily through 8/6, then 5 mg x1 on 8/7, then stopped    - Psych & Pain management evaluated     - Patient started on Oxycontin standing increased to 20mg BID and Percocet PRN 10mg q 6 hours         ** Patient wants to leave AMA - As per pain management and psychiatry not recommending to send on any narcotics upon discharge if leaving AMA - Explained the risk of withdrawal and N/V/D, shaking, dizziness, may ensue - Spoke to infectious disease who does not clear patient for DC, so if leaving AMA can continue ABX course with Keflex and Doxy to complete 8 - week course - Sent to [facility] - Patient understands the risks that oral ABX not as effective as IV and that the infection may recur Pt is a 53 yo male with h/o chronic back pain, active heroin user a/w acute on chronic back pain        Acute right-sided low back pain in the setting of OM on L3 for which neurosurgery and infectious disease consulted and patient started on IV antibiotics     - MRI lumbar spine reveals nonspecific abnormal signal of the L3 vertebral body that is hypointense on T1-weighted imaging and slightly enhancing; No epidural abscess noted    - Neurosurgery followed no intervention warranted     - Infectious disease consulted as patient noted to be bacteremic MSSA; Repeat cultures are NGTD from 8/12 and NICHELLE negative for vegetation     - Nafcillin course until 10/7    - PT evaluated and recommended Rehab placement     - Patient started on Oxycontin standing increased to 20mg BID and Percocet PRN 10mg q 6 hours as per pain management team     - Regimen included Tizanidine, Lidoderm patch, narcotics, Pamelor     repeat cx 8/12 negative     - Repeat MRI 8/19 with progressed enhancement and edema of L3 with perivertebral soft tissue swelling/enhancement, s/o evolving osteo, no epidural abscess        Bacteremia/OM L3    - As above        Hypertension     - Started on Enalapril and Hydralazine with improvement; BP stable         Heroin dependence    - avoided narcotics     - Received methadone taper 10mg daily through 8/6, then 5 mg x1 on 8/7, then stopped    - Psych & Pain management evaluated     - Patient started on Oxycontin standing increased to 20mg BID and Percocet PRN 10mg q 6 hours     - Patient noted with lethargy and confusion 2 episodes 9/4 and 9/5 - Suspicion for patient obtaining additional medications through another source - Happened after family visited. Security called and patient instructed to get back into hospital gown and belongings searched with nothing found - Instructed nursing team to obtain a PCA for supervised family visits and do not allow closed curtain/door         ** Patient wants to leave AMA - As per pain management and psychiatry not recommending to send on any narcotics upon discharge if leaving AMA - Explained the risk of withdrawal and N/V/D, shaking, dizziness, may ensue - Spoke to infectious disease who does not clear patient for DC, so if leaving AMA can continue ABX course with Keflex and Doxy to complete 8 - week course - Sent to [facility] - Patient understands the risks that oral ABX not as effective as IV and that the infection may recur

## 2019-08-06 NOTE — PROGRESS NOTE ADULT - SUBJECTIVE AND OBJECTIVE BOX
LUZ BUCKLEY  54y  Male      Patient is a 54y old  Male who presents with a chief complaint of back pain and sciatica (06 Aug 2019 17:46)  c/o back pain-mild-mod.no sob,no cp    REVIEW OF SYSTEMS:  as above    INTERVAL HPI/OVERNIGHT EVENTS:  T(C): 36.7 (19 @ 21:09), Max: 37.4 (19 @ 05:00)  HR: 68 (19 @ 21:09) (61 - 72)  BP: 153/101 (19 @ 21:09) (140/100 - 153/101)  RR: 18 (19 @ 21:09) (18 - 18)  SpO2: 100% (19 @ 21:09) (100% - 100%)  Wt(kg): --  I&O's Summary    T(C): 36.7 (19 @ 21:09), Max: 37.4 (19 @ 05:00)  HR: 68 (19 @ 21:09) (61 - 72)  BP: 153/101 (19 @ 21:09) (140/100 - 153/101)  RR: 18 (19 @ 21:09) (18 - 18)  SpO2: 100% (19 @ 21:09) (100% - 100%)  Wt(kg): --Vital Signs Last 24 Hrs  T(C): 36.7 (06 Aug 2019 21:09), Max: 37.4 (06 Aug 2019 05:00)  T(F): 98 (06 Aug 2019 21:09), Max: 99.4 (06 Aug 2019 05:00)  HR: 68 (06 Aug 2019 21:09) (61 - 72)  BP: 153/101 (06 Aug 2019 21:09) (140/100 - 153/101)  BP(mean): --  RR: 18 (06 Aug 2019 21:09) (18 - 18)  SpO2: 100% (06 Aug 2019 21:09) (100% - 100%)    LABS:                        15.7   6.12  )-----------( 193      ( 06 Aug 2019 04:35 )             47.7     08-06    135  |  100  |  24<H>  ----------------------------<  132<H>  3.9   |  24  |  0.82    Ca    9.2      06 Aug 2019 04:45  Phos  3.0     08-  Mg     2.0     -    TPro  6.7  /  Alb  2.9<L>  /  TBili  1.0  /  DBili  x   /  AST  89<H>  /  ALT  58<H>  /  AlkPhos  66        Urinalysis Basic - ( 06 Aug 2019 17:30 )    Color: DARK YELLOW / Appearance: CLEAR / S.030 / pH: 6.5  Gluc: NEGATIVE / Ketone: NEGATIVE  / Bili: TRACE / Urobili: LARGE   Blood: NEGATIVE / Protein: 30 / Nitrite: NEGATIVE   Leuk Esterase: NEGATIVE / RBC: 3-5 / WBC 0-2   Sq Epi: OCC / Non Sq Epi: x / Bacteria: NEGATIVE      CAPILLARY BLOOD GLUCOSE            Urinalysis Basic - ( 06 Aug 2019 17:30 )    Color: DARK YELLOW / Appearance: CLEAR / S.030 / pH: 6.5  Gluc: NEGATIVE / Ketone: NEGATIVE  / Bili: TRACE / Urobili: LARGE   Blood: NEGATIVE / Protein: 30 / Nitrite: NEGATIVE   Leuk Esterase: NEGATIVE / RBC: 3-5 / WBC 0-2   Sq Epi: OCC / Non Sq Epi: x / Bacteria: NEGATIVE        PAST MEDICAL & SURGICAL HISTORY:  Sciatica  S/P ORIF (open reduction internal fixation) fracture      MEDICATIONS  (STANDING):  gabapentin 300 milliGRAM(s) Oral three times a day  ketorolac   Injectable 30 milliGRAM(s) IV Push every 6 hours  lidocaine   Patch 1 Patch Transdermal daily  tiZANidine 2 milliGRAM(s) Oral every 6 hours    MEDICATIONS  (PRN):  acetaminophen   Tablet .. 650 milliGRAM(s) Oral every 6 hours PRN Temp greater or equal to 38C (100.4F), Mild Pain (1 - 3)  cloNIDine 0.1 milliGRAM(s) Oral two times a day PRN withdrawl symptoms  hydrOXYzine hydrochloride 50 milliGRAM(s) Oral every 8 hours PRN Anxiety        RADIOLOGY & ADDITIONAL TESTS:    Imaging Personally Reviewed:  [ ] YES  [ ] NO    Consultant(s) Notes Reviewed:  [ ] YES  [ ] NO    PHYSICAL EXAM:  GENERAL: NAD, well-groomed, well-developed  HEAD:  Atraumatic, Normocephalic  EYES: EOMI, PERRLA, conjunctiva and sclera clear  ENMT: No tonsillar erythema, exudates, or enlargement; Moist mucous membranes, Good dentition, No lesions  NECK: Supple, No JVD, Normal thyroid  NERVOUS SYSTEM:  Alert & Oriented X3, Good concentration; Motor Strength 5/5 B/L upper and lower extremities; DTRs 2+ intact and symmetric  CHEST/LUNG: Clear to percussion bilaterally; No rales, rhonchi, wheezing, or rubs  HEART: Regular rate and rhythm; No murmurs, rubs, or gallops  ABDOMEN: Soft, Nontender, Nondistended; Bowel sounds present  EXTREMITIES:  2+ Peripheral Pulses, No clubbing, cyanosis, or edema ,Back-mild tenderness  LYMPH: No lymphadenopathy noted  SKIN: No rashes or lesions    Care Discussed with Consultants/Other Providers [ ] YES  [ ] NO      Code Status: [] Full Code [] DNR [] DNI [] Goals of Care:   Disposition: [] ICU [] Stroke Unit [] RCU []PCU []Floor [] Discharge Home         ANTONETTE RodriguezFACP

## 2019-08-06 NOTE — DISCHARGE NOTE PROVIDER - PROVIDER TOKENS
PROVIDER:[TOKEN:[[id number]]] PROVIDER:[TOKEN:[[id number]]],PROVIDER:[TOKEN:[[id number]]],PROVIDER:[TOKEN:[[id number]]],PROVIDER:[TOKEN:[[id number]]]

## 2019-08-06 NOTE — DISCHARGE NOTE PROVIDER - CARE PROVIDER_API CALL
Aleksandr Rodriguez)  Internal Medicine  [address] [place] [address]  Phone: [phone]  Fax: [phone]  Follow Up Time: Aleksandr Rodriguez)  Internal Medicine  [address] [place] [address]  Phone: [phone]  Fax: [phone]  Follow Up Time:     Mei Gabriel)  Internal Medicine  [address] [facility], [place] [address]  Phone: [phone]  Fax: [phone]  Follow Up Time:     Etienne Orr (DO)  Gastroenterology; Internal Medicine  [address] [place], [place] [id number]  [place] [address]  Phone: [phone]  Fax: [phone]  Follow Up Time:     Erik Rizo)  Neurological Surgery  [address] [facility], [address]  [place] [address]  Phone: [phone]  Fax: [phone]  Follow Up Time:

## 2019-08-06 NOTE — CONSULT NOTE ADULT - ASSESSMENT
54y m w/. L3 hypointensity   -CT L spine wo contrast  -Will d/w attendng 54y m w/. L3 hypointensity   -CT L spine wo contrast to rule out fracture however patient denies recent rauma  - WBAT  - D./w Dr. Martin

## 2019-08-06 NOTE — PROVIDER CONTACT NOTE (MEDICATION) - ASSESSMENT
Pt given Toradol and Zanaflex 30 minutes earlier pain is 10 out of 10 on pain scale. Pt also given Clonidine 0.1 for withdrawal symptoms

## 2019-08-06 NOTE — DISCHARGE NOTE PROVIDER - CARE PROVIDERS DIRECT ADDRESSES
,DirectAddress_Unknown ,DirectAddress_Unknown,[email or web address]

## 2019-08-06 NOTE — DISCHARGE NOTE PROVIDER - NSDCCPCAREPLAN_GEN_ALL_CORE_FT
PRINCIPAL DISCHARGE DIAGNOSIS  Diagnosis: Acute right-sided low back pain with right-sided sciatica  Assessment and Plan of Treatment: Acute right-sided low back pain with right-sided sciatica      SECONDARY DISCHARGE DIAGNOSES  Diagnosis: Moderate protein-calorie malnutrition  Assessment and Plan of Treatment: Moderate protein-calorie malnutrition    Diagnosis: Heroin dependence  Assessment and Plan of Treatment: Heroin dependence PRINCIPAL DISCHARGE DIAGNOSIS  Diagnosis: Acute right-sided low back pain with right-sided sciatica  Assessment and Plan of Treatment: Acute right-sided low back pain with right-sided sciatica      SECONDARY DISCHARGE DIAGNOSES  Diagnosis: Moderate protein-calorie malnutrition  Assessment and Plan of Treatment: Maintain a regular diet, Continue Ensure Enlive 240mls 3 cans  daily    Diagnosis: Heroin dependence  Assessment and Plan of Treatment: Heroin dependence PRINCIPAL DISCHARGE DIAGNOSIS  Diagnosis: Acute right-sided low back pain with right-sided sciatica  Assessment and Plan of Treatment: Please follow-up with your chronic pain specialist as outpatient. You are recommended to go to rehab, however, you are leaving against medical advice, so you are being sent home with a prescription for outpatient physical therapy. Please maintain a safe environment where you reside. Place night lights in the hallways and non-slip rugs/mats on hard surfaces. It is recommended that you move in a careful manner to prevent future events. Pain medications (Excluding narcotics) sent to [facility]      SECONDARY DISCHARGE DIAGNOSES  Diagnosis: Osteomyelitis  Assessment and Plan of Treatment: Infection of the lumbar 3 vertebrae for which you were started on IV antibiotics. You are leaving against medical advice - It is important to complete the oral antibiotics sent to the pharmacy and follow-up with infectious disease as outpatient.    Diagnosis: Hypertension  Assessment and Plan of Treatment: Continue blood pressure medication regimen as directed. Monitor for any visual changes, headaches or dizziness.  Monitor blood pressure regularly.  Follow up with your primary care provider for further management for high blood pressure.    Diagnosis: Bacteremia  Assessment and Plan of Treatment: Infectious disease staretd you on IV antibiotics for which you were supposed to stay in the hospital for because oral antibiotics are not as effective - However please  oral antibiotics from [facility] pharmacy as you are leaving against medical advice.    Diagnosis: Hepatitis C virus  Assessment and Plan of Treatment: Please follow-up with gastrointestinal medicine as outpatient for further work-up/treatment.    Diagnosis: Heroin dependence  Assessment and Plan of Treatment: Please abstain from using IV drugs or other illegal drugs as you are at risk for severe infection.

## 2019-08-06 NOTE — CONSULT NOTE ADULT - SUBJECTIVE AND OBJECTIVE BOX
HPI:  Pt is a 53 yo male with chronic back pain, kidney cyst a/w acute on chronic back pain with sciatica with difficulty ambulating due to pain.  Pt reports a similar episode approx 15 years ago.  Upon further questioning about that epiosde, pt states he was told it was due to the deposition of the impurities of the heroin he used.  States they put a needle in his back, unclear if he had abx during that time.  Pt states he is still using heroin every 3-4 days, about 4 bags at a time.  Reports he is also feeling a little "sick" and has not eaten in 4 days due to using and his pain; currently is hungry.  Pt states he had a neg HIV test about 3 months ago and states he uses clean needles every time he injects.    Pt denies fever, chills, incontinence bowel or bladder (04 Aug 2019 09:08)    PAST MEDICAL & SURGICAL HISTORY:  Sciatica  S/P ORIF (open reduction internal fixation) fracture    Allergies    No Known Allergies      acetaminophen   Tablet .. 650 milliGRAM(s) Oral every 6 hours PRN  cloNIDine 0.1 milliGRAM(s) Oral two times a day PRN  gabapentin 300 milliGRAM(s) Oral three times a day  hydrOXYzine hydrochloride 50 milliGRAM(s) Oral every 8 hours PRN  ketorolac   Injectable 30 milliGRAM(s) IV Push every 6 hours  lidocaine   Patch 1 Patch Transdermal daily  tiZANidine 2 milliGRAM(s) Oral every 6 hours    SOCIAL HISTORY:  IVDA- heroin    Vital Signs Last 24 Hrs  T(C): 36.8 (06 Aug 2019 14:36), Max: 37.4 (06 Aug 2019 05:00)  T(F): 98.3 (06 Aug 2019 14:36), Max: 99.4 (06 Aug 2019 05:00)  HR: 61 (06 Aug 2019 14:36) (61 - 72)  BP: 143/97 (06 Aug 2019 14:36) (140/100 - 155/100)  BP(mean): --  RR: 18 (06 Aug 2019 14:36) (18 - 19)  SpO2: 100% (06 Aug 2019 14:36) (100% - 100%)    PHYSICAL EXAM:  Awake Alert Attentive Affect appropriate Ox3  PERRL   Motor:   Tone: normal.                  Strength:       [X] Lower extremity                       HF          KE          KF        DF         PF                                               R        5/5        5/5        5/5       5/5       5/5                                               L         5/5        5/5       5/5       5/5        5/5  Sensory Intact to Light Touch      LABS:                          15.7   6.12  )-----------( 193      ( 06 Aug 2019 04:35 )             47.7     08-06    135  |  100  |  24<H>  ----------------------------<  132<H>  3.9   |  24  |  0.82    Ca    9.2      06 Aug 2019 04:45  Phos  3.0     08-06  Mg     2.0     08-06    TPro  6.7  /  Alb  2.9<L>  /  TBili  1.0  /  DBili  x   /  AST  89<H>  /  ALT  58<H>  /  AlkPhos  66  08-06          RADIOLOGY & ADDITIONAL STUDIES:    < from: MR Lumbar Spine w/wo IV Cont (08.05.19 @ 22:12) >  IMPRESSION:   Nonspecific abnormal signal of the L3 vertebral body that is hypointense   on T1-weighted imaging and slightly enhancing. This may represent occult   fracture though underlying lesion cannot entirely excluded. Continued   close interval follow-up until resolution is recommended. The possibility   of underlying infection cannot be entirely excluded as well though is   less likely given the pattern of abnormal signal and enhancement.    < end of copied text >

## 2019-08-07 LAB
ALBUMIN SERPL ELPH-MCNC: 2.8 G/DL — LOW (ref 3.3–5)
ALP SERPL-CCNC: 60 U/L — SIGNIFICANT CHANGE UP (ref 40–120)
ALT FLD-CCNC: 52 U/L — HIGH (ref 4–41)
ANION GAP SERPL CALC-SCNC: 14 MMO/L — SIGNIFICANT CHANGE UP (ref 7–14)
AST SERPL-CCNC: 60 U/L — HIGH (ref 4–40)
BASOPHILS # BLD AUTO: 0.02 K/UL — SIGNIFICANT CHANGE UP (ref 0–0.2)
BASOPHILS NFR BLD AUTO: 0.3 % — SIGNIFICANT CHANGE UP (ref 0–2)
BILIRUB SERPL-MCNC: 0.7 MG/DL — SIGNIFICANT CHANGE UP (ref 0.2–1.2)
BUN SERPL-MCNC: 20 MG/DL — SIGNIFICANT CHANGE UP (ref 7–23)
CALCIUM SERPL-MCNC: 9.2 MG/DL — SIGNIFICANT CHANGE UP (ref 8.4–10.5)
CHLORIDE SERPL-SCNC: 99 MMOL/L — SIGNIFICANT CHANGE UP (ref 98–107)
CO2 SERPL-SCNC: 26 MMOL/L — SIGNIFICANT CHANGE UP (ref 22–31)
CREAT SERPL-MCNC: 0.81 MG/DL — SIGNIFICANT CHANGE UP (ref 0.5–1.3)
EOSINOPHIL # BLD AUTO: 0.09 K/UL — SIGNIFICANT CHANGE UP (ref 0–0.5)
EOSINOPHIL NFR BLD AUTO: 1.5 % — SIGNIFICANT CHANGE UP (ref 0–6)
GLUCOSE SERPL-MCNC: 106 MG/DL — HIGH (ref 70–99)
HCT VFR BLD CALC: 46.5 % — SIGNIFICANT CHANGE UP (ref 39–50)
HGB BLD-MCNC: 15.6 G/DL — SIGNIFICANT CHANGE UP (ref 13–17)
IMM GRANULOCYTES NFR BLD AUTO: 0.3 % — SIGNIFICANT CHANGE UP (ref 0–1.5)
LYMPHOCYTES # BLD AUTO: 1.69 K/UL — SIGNIFICANT CHANGE UP (ref 1–3.3)
LYMPHOCYTES # BLD AUTO: 27.3 % — SIGNIFICANT CHANGE UP (ref 13–44)
MAGNESIUM SERPL-MCNC: 2.1 MG/DL — SIGNIFICANT CHANGE UP (ref 1.6–2.6)
MCHC RBC-ENTMCNC: 30.7 PG — SIGNIFICANT CHANGE UP (ref 27–34)
MCHC RBC-ENTMCNC: 33.5 % — SIGNIFICANT CHANGE UP (ref 32–36)
MCV RBC AUTO: 91.5 FL — SIGNIFICANT CHANGE UP (ref 80–100)
METHOD TYPE: SIGNIFICANT CHANGE UP
MONOCYTES # BLD AUTO: 0.95 K/UL — HIGH (ref 0–0.9)
MONOCYTES NFR BLD AUTO: 15.3 % — HIGH (ref 2–14)
NEUTROPHILS # BLD AUTO: 3.43 K/UL — SIGNIFICANT CHANGE UP (ref 1.8–7.4)
NEUTROPHILS NFR BLD AUTO: 55.3 % — SIGNIFICANT CHANGE UP (ref 43–77)
NRBC # FLD: 0 K/UL — SIGNIFICANT CHANGE UP (ref 0–0)
ORGANISM # SPEC MICROSCOPIC CNT: SIGNIFICANT CHANGE UP
ORGANISM # SPEC MICROSCOPIC CNT: SIGNIFICANT CHANGE UP
PHOSPHATE SERPL-MCNC: 3.1 MG/DL — SIGNIFICANT CHANGE UP (ref 2.5–4.5)
PLATELET # BLD AUTO: 200 K/UL — SIGNIFICANT CHANGE UP (ref 150–400)
PMV BLD: 11.4 FL — SIGNIFICANT CHANGE UP (ref 7–13)
POTASSIUM SERPL-MCNC: 3.9 MMOL/L — SIGNIFICANT CHANGE UP (ref 3.5–5.3)
POTASSIUM SERPL-SCNC: 3.9 MMOL/L — SIGNIFICANT CHANGE UP (ref 3.5–5.3)
PROT SERPL-MCNC: 6.1 G/DL — SIGNIFICANT CHANGE UP (ref 6–8.3)
RBC # BLD: 5.08 M/UL — SIGNIFICANT CHANGE UP (ref 4.2–5.8)
RBC # FLD: 13.7 % — SIGNIFICANT CHANGE UP (ref 10.3–14.5)
SODIUM SERPL-SCNC: 139 MMOL/L — SIGNIFICANT CHANGE UP (ref 135–145)
SPECIMEN SOURCE: SIGNIFICANT CHANGE UP
WBC # BLD: 6.2 K/UL — SIGNIFICANT CHANGE UP (ref 3.8–10.5)
WBC # FLD AUTO: 6.2 K/UL — SIGNIFICANT CHANGE UP (ref 3.8–10.5)

## 2019-08-07 PROCEDURE — 72131 CT LUMBAR SPINE W/O DYE: CPT | Mod: 26

## 2019-08-07 RX ORDER — AMLODIPINE BESYLATE 2.5 MG/1
5 TABLET ORAL DAILY
Refills: 0 | Status: DISCONTINUED | OUTPATIENT
Start: 2019-08-07 | End: 2019-08-07

## 2019-08-07 RX ORDER — LISINOPRIL 2.5 MG/1
10 TABLET ORAL DAILY
Refills: 0 | Status: DISCONTINUED | OUTPATIENT
Start: 2019-08-07 | End: 2019-08-12

## 2019-08-07 RX ORDER — VANCOMYCIN HCL 1 G
1000 VIAL (EA) INTRAVENOUS ONCE
Refills: 0 | Status: COMPLETED | OUTPATIENT
Start: 2019-08-07 | End: 2019-08-07

## 2019-08-07 RX ORDER — KETOROLAC TROMETHAMINE 30 MG/ML
15 SYRINGE (ML) INJECTION ONCE
Refills: 0 | Status: DISCONTINUED | OUTPATIENT
Start: 2019-08-07 | End: 2019-08-07

## 2019-08-07 RX ADMIN — LIDOCAINE 1 PATCH: 4 CREAM TOPICAL at 23:29

## 2019-08-07 RX ADMIN — TIZANIDINE 2 MILLIGRAM(S): 4 TABLET ORAL at 23:28

## 2019-08-07 RX ADMIN — GABAPENTIN 300 MILLIGRAM(S): 400 CAPSULE ORAL at 05:40

## 2019-08-07 RX ADMIN — METHADONE HYDROCHLORIDE 5 MILLIGRAM(S): 40 TABLET ORAL at 11:36

## 2019-08-07 RX ADMIN — LIDOCAINE 1 PATCH: 4 CREAM TOPICAL at 18:22

## 2019-08-07 RX ADMIN — Medication 250 MILLIGRAM(S): at 23:28

## 2019-08-07 RX ADMIN — TIZANIDINE 2 MILLIGRAM(S): 4 TABLET ORAL at 05:40

## 2019-08-07 RX ADMIN — TIZANIDINE 2 MILLIGRAM(S): 4 TABLET ORAL at 17:08

## 2019-08-07 RX ADMIN — LISINOPRIL 10 MILLIGRAM(S): 2.5 TABLET ORAL at 21:57

## 2019-08-07 RX ADMIN — Medication 30 MILLIGRAM(S): at 23:54

## 2019-08-07 RX ADMIN — LIDOCAINE 1 PATCH: 4 CREAM TOPICAL at 11:36

## 2019-08-07 RX ADMIN — TIZANIDINE 2 MILLIGRAM(S): 4 TABLET ORAL at 11:35

## 2019-08-07 RX ADMIN — GABAPENTIN 300 MILLIGRAM(S): 400 CAPSULE ORAL at 21:57

## 2019-08-07 RX ADMIN — Medication 30 MILLIGRAM(S): at 17:07

## 2019-08-07 RX ADMIN — Medication 15 MILLIGRAM(S): at 21:49

## 2019-08-07 RX ADMIN — Medication 30 MILLIGRAM(S): at 11:35

## 2019-08-07 RX ADMIN — GABAPENTIN 300 MILLIGRAM(S): 400 CAPSULE ORAL at 15:59

## 2019-08-07 RX ADMIN — Medication 30 MILLIGRAM(S): at 05:30

## 2019-08-07 RX ADMIN — Medication 30 MILLIGRAM(S): at 05:39

## 2019-08-07 RX ADMIN — Medication 30 MILLIGRAM(S): at 17:37

## 2019-08-07 RX ADMIN — Medication 30 MILLIGRAM(S): at 00:41

## 2019-08-07 RX ADMIN — Medication 15 MILLIGRAM(S): at 22:04

## 2019-08-07 NOTE — PROVIDER CONTACT NOTE (OTHER) - ASSESSMENT
blood pressure 147/104, HR 64, RR 18, spo2 100, pt states he takes Enalapril (Vasotec) 20 mg at home for BP management.

## 2019-08-07 NOTE — PROGRESS NOTE ADULT - SUBJECTIVE AND OBJECTIVE BOX
MARCIO LUZ  54y  Male      Patient is a 54y old  Male who presents with a chief complaint of back pain and sciatica (06 Aug 2019 23:50)  c/o Back pain,requesting pain meds now.no fever,no sob,no cp,no cough    REVIEW OF SYSTEMS:  neg except-back pain  INTERVAL HPI/OVERNIGHT EVENTS:  T(C): 36.7 (19 @ 21:22), Max: 36.9 (19 @ 05:11)  HR: 66 (19 @ 21:22) (62 - 66)  BP: 147/103 (19 @ 21:22) (132/96 - 147/104)  RR: 18 (19 @ 21:22) (18 - 18)  SpO2: 100% (19:22) (100% - 100%)  Wt(kg): --  I&O's Summary    07 Aug 2019 07:01  -  07 Aug 2019 21:32  --------------------------------------------------------  IN: 647 mL / OUT: 650 mL / NET: -3 mL      T(C): 36.7 (19 @ 21:22), Max: 36.9 (19 @ 05:11)  HR: 66 (19 @ 21:22) (62 - 66)  BP: 147/103 (19 @ 21:22) (132/96 - 147/104)  RR: 18 (19 @ 21:22) (18 - 18)  SpO2: 100% (19 @ 21:22) (100% - 100%)  Wt(kg): --Vital Signs Last 24 Hrs  T(C): 36.7 (07 Aug 2019 21:22), Max: 36.9 (07 Aug 2019 05:11)  T(F): 98 (07 Aug 2019 21:22), Max: 98.4 (07 Aug 2019 05:11)  HR: 66 (07 Aug 2019 21:22) (62 - 66)  BP: 147/103 (07 Aug 2019 21:22) (132/96 - 147/104)  BP(mean): --  RR: 18 (07 Aug 2019 21:22) (18 - 18)  SpO2: 100% (07 Aug 2019 21:22) (100% - 100%)    LABS:                        15.6   6.20  )-----------( 200      ( 07 Aug 2019 05:46 )             46.5     08-07    139  |  99  |  20  ----------------------------<  106<H>  3.9   |  26  |  0.81    Ca    9.2      07 Aug 2019 05:46  Phos  3.1     08-  Mg     2.1     08-    TPro  6.1  /  Alb  2.8<L>  /  TBili  0.7  /  DBili  x   /  AST  60<H>  /  ALT  52<H>  /  AlkPhos  60  08-07      Urinalysis Basic - ( 06 Aug 2019 17:30 )    Color: DARK YELLOW / Appearance: CLEAR / S.030 / pH: 6.5  Gluc: NEGATIVE / Ketone: NEGATIVE  / Bili: TRACE / Urobili: LARGE   Blood: NEGATIVE / Protein: 30 / Nitrite: NEGATIVE   Leuk Esterase: NEGATIVE / RBC: 3-5 / WBC 0-2   Sq Epi: OCC / Non Sq Epi: x / Bacteria: NEGATIVE      CAPILLARY BLOOD GLUCOSE            Urinalysis Basic - ( 06 Aug 2019 17:30 )    Color: DARK YELLOW / Appearance: CLEAR / S.030 / pH: 6.5  Gluc: NEGATIVE / Ketone: NEGATIVE  / Bili: TRACE / Urobili: LARGE   Blood: NEGATIVE / Protein: 30 / Nitrite: NEGATIVE   Leuk Esterase: NEGATIVE / RBC: 3-5 / WBC 0-2   Sq Epi: OCC / Non Sq Epi: x / Bacteria: NEGATIVE        PAST MEDICAL & SURGICAL HISTORY:  Sciatica  S/P ORIF (open reduction internal fixation) fracture      MEDICATIONS  (STANDING):  gabapentin 300 milliGRAM(s) Oral three times a day  ketorolac   Injectable 15 milliGRAM(s) IV Push once  ketorolac   Injectable 30 milliGRAM(s) IV Push every 6 hours  lidocaine   Patch 1 Patch Transdermal daily  lisinopril 10 milliGRAM(s) Oral daily  tiZANidine 2 milliGRAM(s) Oral every 6 hours    MEDICATIONS  (PRN):  acetaminophen   Tablet .. 650 milliGRAM(s) Oral every 6 hours PRN Temp greater or equal to 38C (100.4F), Mild Pain (1 - 3)  cloNIDine 0.1 milliGRAM(s) Oral two times a day PRN withdrawl symptoms  hydrOXYzine hydrochloride 50 milliGRAM(s) Oral every 8 hours PRN Anxiety        RADIOLOGY & ADDITIONAL TESTS:    Imaging Personally Reviewed:  [ ] YES  [ ] NO    Consultant(s) Notes Reviewed:  [ ] YES  [ ] NO    PHYSICAL EXAM:  GENERAL: NAD, well-groomed, well-developed  HEAD:  Atraumatic, Normocephalic  EYES: EOMI, PERRLA, conjunctiva and sclera clear  ENMT: No tonsillar erythema, exudates, or enlargement; Moist mucous membranes, Good dentition, No lesions  NECK: Supple, No JVD, Normal thyroid  NERVOUS SYSTEM:  Alert & Oriented X3, nonfocal  CHEST/LUNG: Clear to percussion bilaterally; No rales, rhonchi, wheezing, or rubs  HEART: Regular rate and rhythm; No murmurs, rubs, or gallops  ABDOMEN: Soft, Nontender, Nondistended; Bowel sounds present  EXTREMITIES:  2+ Peripheral Pulses, No clubbing, cyanosis, or edema  LYMPH: No lymphadenopathy noted  SKIN: No rashes or lesions    Care Discussed with Consultants/Other Providers [ ] YES  [ ] NO      Code Status: [] Full Code [] DNR [] DNI [] Goals of Care:   Disposition: [] ICU [] Stroke Unit [] RCU []PCU []Floor [] Discharge Home         ANTONETTE RodriguezFACP

## 2019-08-08 LAB
ALBUMIN SERPL ELPH-MCNC: 2.8 G/DL — LOW (ref 3.3–5)
ALP SERPL-CCNC: 62 U/L — SIGNIFICANT CHANGE UP (ref 40–120)
ALT FLD-CCNC: 43 U/L — HIGH (ref 4–41)
ANION GAP SERPL CALC-SCNC: 11 MMO/L — SIGNIFICANT CHANGE UP (ref 7–14)
AST SERPL-CCNC: 41 U/L — HIGH (ref 4–40)
BASOPHILS # BLD AUTO: 0.04 K/UL — SIGNIFICANT CHANGE UP (ref 0–0.2)
BASOPHILS NFR BLD AUTO: 0.4 % — SIGNIFICANT CHANGE UP (ref 0–2)
BILIRUB SERPL-MCNC: 0.4 MG/DL — SIGNIFICANT CHANGE UP (ref 0.2–1.2)
BUN SERPL-MCNC: 26 MG/DL — HIGH (ref 7–23)
CALCIUM SERPL-MCNC: 9.5 MG/DL — SIGNIFICANT CHANGE UP (ref 8.4–10.5)
CHLORIDE SERPL-SCNC: 98 MMOL/L — SIGNIFICANT CHANGE UP (ref 98–107)
CO2 SERPL-SCNC: 27 MMOL/L — SIGNIFICANT CHANGE UP (ref 22–31)
CREAT SERPL-MCNC: 0.84 MG/DL — SIGNIFICANT CHANGE UP (ref 0.5–1.3)
EOSINOPHIL # BLD AUTO: 0.11 K/UL — SIGNIFICANT CHANGE UP (ref 0–0.5)
EOSINOPHIL NFR BLD AUTO: 1.2 % — SIGNIFICANT CHANGE UP (ref 0–6)
GLUCOSE SERPL-MCNC: 106 MG/DL — HIGH (ref 70–99)
HCT VFR BLD CALC: 45.7 % — SIGNIFICANT CHANGE UP (ref 39–50)
HCV RNA SERPL NAA DL=5-ACNC: HIGH IU/ML
HCV RNA SPEC NAA+PROBE-LOG IU: 6.38 LOGIU/ML — HIGH
HGB BLD-MCNC: 14.8 G/DL — SIGNIFICANT CHANGE UP (ref 13–17)
IMM GRANULOCYTES NFR BLD AUTO: 0.5 % — SIGNIFICANT CHANGE UP (ref 0–1.5)
LYMPHOCYTES # BLD AUTO: 1.95 K/UL — SIGNIFICANT CHANGE UP (ref 1–3.3)
LYMPHOCYTES # BLD AUTO: 21.1 % — SIGNIFICANT CHANGE UP (ref 13–44)
MAGNESIUM SERPL-MCNC: 2 MG/DL — SIGNIFICANT CHANGE UP (ref 1.6–2.6)
MCHC RBC-ENTMCNC: 29.4 PG — SIGNIFICANT CHANGE UP (ref 27–34)
MCHC RBC-ENTMCNC: 32.4 % — SIGNIFICANT CHANGE UP (ref 32–36)
MCV RBC AUTO: 90.7 FL — SIGNIFICANT CHANGE UP (ref 80–100)
MONOCYTES # BLD AUTO: 1.04 K/UL — HIGH (ref 0–0.9)
MONOCYTES NFR BLD AUTO: 11.3 % — SIGNIFICANT CHANGE UP (ref 2–14)
NEUTROPHILS # BLD AUTO: 6.04 K/UL — SIGNIFICANT CHANGE UP (ref 1.8–7.4)
NEUTROPHILS NFR BLD AUTO: 65.5 % — SIGNIFICANT CHANGE UP (ref 43–77)
NRBC # FLD: 0 K/UL — SIGNIFICANT CHANGE UP (ref 0–0)
ORGANISM # SPEC MICROSCOPIC CNT: SIGNIFICANT CHANGE UP
PHOSPHATE SERPL-MCNC: 3.2 MG/DL — SIGNIFICANT CHANGE UP (ref 2.5–4.5)
PLATELET # BLD AUTO: 259 K/UL — SIGNIFICANT CHANGE UP (ref 150–400)
PMV BLD: 11.3 FL — SIGNIFICANT CHANGE UP (ref 7–13)
POTASSIUM SERPL-MCNC: 4.6 MMOL/L — SIGNIFICANT CHANGE UP (ref 3.5–5.3)
POTASSIUM SERPL-SCNC: 4.6 MMOL/L — SIGNIFICANT CHANGE UP (ref 3.5–5.3)
PROT SERPL-MCNC: 6.2 G/DL — SIGNIFICANT CHANGE UP (ref 6–8.3)
RBC # BLD: 5.04 M/UL — SIGNIFICANT CHANGE UP (ref 4.2–5.8)
RBC # FLD: 13.8 % — SIGNIFICANT CHANGE UP (ref 10.3–14.5)
SODIUM SERPL-SCNC: 136 MMOL/L — SIGNIFICANT CHANGE UP (ref 135–145)
WBC # BLD: 9.23 K/UL — SIGNIFICANT CHANGE UP (ref 3.8–10.5)
WBC # FLD AUTO: 9.23 K/UL — SIGNIFICANT CHANGE UP (ref 3.8–10.5)

## 2019-08-08 PROCEDURE — 99223 1ST HOSP IP/OBS HIGH 75: CPT | Mod: GC

## 2019-08-08 RX ORDER — KETOROLAC TROMETHAMINE 30 MG/ML
15 SYRINGE (ML) INJECTION EVERY 6 HOURS
Refills: 0 | Status: DISCONTINUED | OUTPATIENT
Start: 2019-08-08 | End: 2019-08-09

## 2019-08-08 RX ORDER — CEFAZOLIN SODIUM 1 G
2000 VIAL (EA) INJECTION EVERY 8 HOURS
Refills: 0 | Status: DISCONTINUED | OUTPATIENT
Start: 2019-08-08 | End: 2019-08-13

## 2019-08-08 RX ORDER — TIZANIDINE 4 MG/1
4 TABLET ORAL EVERY 6 HOURS
Refills: 0 | Status: DISCONTINUED | OUTPATIENT
Start: 2019-08-08 | End: 2019-08-16

## 2019-08-08 RX ORDER — CEFAZOLIN SODIUM 1 G
VIAL (EA) INJECTION
Refills: 0 | Status: DISCONTINUED | OUTPATIENT
Start: 2019-08-08 | End: 2019-08-13

## 2019-08-08 RX ORDER — TIZANIDINE 4 MG/1
4 TABLET ORAL EVERY 6 HOURS
Refills: 0 | Status: DISCONTINUED | OUTPATIENT
Start: 2019-08-08 | End: 2019-08-08

## 2019-08-08 RX ORDER — CEFAZOLIN SODIUM 1 G
2000 VIAL (EA) INJECTION ONCE
Refills: 0 | Status: COMPLETED | OUTPATIENT
Start: 2019-08-08 | End: 2019-08-08

## 2019-08-08 RX ADMIN — TIZANIDINE 4 MILLIGRAM(S): 4 TABLET ORAL at 12:55

## 2019-08-08 RX ADMIN — GABAPENTIN 300 MILLIGRAM(S): 400 CAPSULE ORAL at 21:25

## 2019-08-08 RX ADMIN — Medication 15 MILLIGRAM(S): at 17:39

## 2019-08-08 RX ADMIN — Medication 30 MILLIGRAM(S): at 05:18

## 2019-08-08 RX ADMIN — Medication 650 MILLIGRAM(S): at 05:30

## 2019-08-08 RX ADMIN — Medication 650 MILLIGRAM(S): at 06:25

## 2019-08-08 RX ADMIN — GABAPENTIN 300 MILLIGRAM(S): 400 CAPSULE ORAL at 05:13

## 2019-08-08 RX ADMIN — Medication 650 MILLIGRAM(S): at 11:14

## 2019-08-08 RX ADMIN — Medication 30 MILLIGRAM(S): at 05:33

## 2019-08-08 RX ADMIN — LISINOPRIL 10 MILLIGRAM(S): 2.5 TABLET ORAL at 05:35

## 2019-08-08 RX ADMIN — Medication 650 MILLIGRAM(S): at 18:20

## 2019-08-08 RX ADMIN — Medication 650 MILLIGRAM(S): at 12:35

## 2019-08-08 RX ADMIN — Medication 30 MILLIGRAM(S): at 00:09

## 2019-08-08 RX ADMIN — Medication 650 MILLIGRAM(S): at 17:39

## 2019-08-08 RX ADMIN — Medication 30 MILLIGRAM(S): at 12:35

## 2019-08-08 RX ADMIN — Medication 15 MILLIGRAM(S): at 18:17

## 2019-08-08 RX ADMIN — TIZANIDINE 2 MILLIGRAM(S): 4 TABLET ORAL at 05:13

## 2019-08-08 RX ADMIN — TIZANIDINE 4 MILLIGRAM(S): 4 TABLET ORAL at 21:25

## 2019-08-08 RX ADMIN — Medication 100 MILLIGRAM(S): at 18:20

## 2019-08-08 RX ADMIN — Medication 30 MILLIGRAM(S): at 11:29

## 2019-08-08 RX ADMIN — GABAPENTIN 300 MILLIGRAM(S): 400 CAPSULE ORAL at 14:51

## 2019-08-08 RX ADMIN — Medication 100 MILLIGRAM(S): at 21:25

## 2019-08-08 NOTE — CONSULT NOTE ADULT - SUBJECTIVE AND OBJECTIVE BOX
Requested to consult on this patient for pain management.    HPI: [id number]  Handoff  MEWS Score  Sciatica  Sciatica  Sciatica  Moderate protein-calorie malnutrition  Heroin dependence  Acute right-sided low back pain with right-sided sciatica  S/P ORIF (open reduction internal fixation) fracture  No significant past surgical history  No significant past surgical history  LOWER BACK AND LEG PAIN      acetaminophen   Tablet .. 650 milliGRAM(s) Oral every 6 hours PRN  cloNIDine 0.1 milliGRAM(s) Oral two times a day PRN  gabapentin 300 milliGRAM(s) Oral three times a day  hydrOXYzine hydrochloride 50 milliGRAM(s) Oral every 8 hours PRN  ketorolac   Injectable 30 milliGRAM(s) IV Push every 6 hours  lidocaine   Patch 1 Patch Transdermal daily  lisinopril 10 milliGRAM(s) Oral daily  tiZANidine 2 milliGRAM(s) Oral every 6 hours      No Known Allergies      Hepatitis C Virus RNA Detection by PCR (08-07-19 @ 13:08)  Hepatitis C Virus RNA Detection by PCR: AM Sched. Collection: 08-Aug-2019 06:00 (08-07-19 @ 13:10)  Consult- PT Evaluate and Treat:   *Reason for Consult - Must select at least one choice*     Other: back pain with sciatica  Weight Bearing Restrictions: No (08-07-19 @ 15:16)  amLODIPine   Tablet: [Ordered as NORVASC]  5 milliGRAM(s), Oral, daily  Special Instructions: Hold SBP <110, HR < 60  Administration Instructions: This is a Look-alike/Sound-alike Medication (08-07-19 @ 16:38)  ketorolac   Injectable: [Ordered as TORADOL Injectable]  15 milliGRAM(s), IV Push, once, Stop After 1 Doses  Administration Instructions: IF IV PUSH, ADMINISTER OVER 1 MINUTE  Provider's Contact #: [phone] (08-07-19 @ 21:30)  lisinopril: [Known as ZESTRIL]  10 milliGRAM(s), Oral, daily  Special Instructions: hold if SBP<100  Provider's Contact #: [phone] (08-07-19 @ 21:31)  vancomycin  IVPB:   1000 milliGRAM(s) in dextrose 5% 250 milliLiter(s), IV Intermittent, once, infuse over 60 Minute(s), Stop After 1 Doses  Indication: gram + cocci bactermia  Provider's Contact #: [phone] (08-07-19 @ 22:05)  Chart Check (08-08-19 @ 07:01)  Chart Check (08-08-19 @ 07:02)  Chart Check (08-08-19 @ 07:03)  Culture - Blood: Routine  Specimen Source: Blood (08-08-19 @ 08:25)  Complete Blood Count + Automated Diff: AM Sched. Collection: 09-Aug-2019 06:00 (08-08-19 @ 08:26)  Comprehensive Metabolic, Mg + Phosphorus: AM Sched. Collection: 09-Aug-2019 06:00 (08-08-19 @ 08:26)                   PHYSICAL EXAM:    GENERAL: Alert & Oriented x 3 in NAD, well-groomed, well-developed         Impression/Plan: Requested by to help manage pain. Seen pt. up with PT, still complaining of 10/10 pain. Plan to increase Tizanidine to 4mg Q6hrs PRN, and add IV tylenol may give first dose stat.  May call Pain Service if further assistance needed.    Patient was seen 08-08-19 @ 10:20

## 2019-08-08 NOTE — PROGRESS NOTE ADULT - SUBJECTIVE AND OBJECTIVE BOX
MARCIO LUZ  54y  Male      Patient is a 54y old  Male who presents with a chief complaint of back pain and sciatica (08 Aug 2019 17:59)  Above noted.c/o chronic back pain.seen by ID,Pain management.no fever,no sob,no cp    REVIEW OF SYSTEMS:  neg      INTERVAL HPI/OVERNIGHT EVENTS:  T(C): 36.9 (08-08-19 @ 12:39), Max: 36.9 (08-08-19 @ 05:46)  HR: 68 (08-08-19 @ 12:39) (64 - 68)  BP: 127/76 (08-08-19 @ 12:39) (127/76 - 147/103)  RR: 17 (08-08-19 @ 12:39) (17 - 18)  SpO2: 100% (08-08-19 @ 12:39) (100% - 100%)  Wt(kg): --  I&O's Summary    07 Aug 2019 07:01  -  08 Aug 2019 07:00  --------------------------------------------------------  IN: 647 mL / OUT: 650 mL / NET: -3 mL    08 Aug 2019 07:01  -  08 Aug 2019 20:16  --------------------------------------------------------  IN: 50 mL / OUT: 900 mL / NET: -850 mL      T(C): 36.9 (08-08-19 @ 12:39), Max: 36.9 (08-08-19 @ 05:46)  HR: 68 (08-08-19 @ 12:39) (64 - 68)  BP: 127/76 (08-08-19 @ 12:39) (127/76 - 147/103)  RR: 17 (08-08-19 @ 12:39) (17 - 18)  SpO2: 100% (08-08-19 @ 12:39) (100% - 100%)  Wt(kg): --Vital Signs Last 24 Hrs  T(C): 36.9 (08 Aug 2019 12:39), Max: 36.9 (08 Aug 2019 05:46)  T(F): 98.4 (08 Aug 2019 12:39), Max: 98.4 (08 Aug 2019 05:46)  HR: 68 (08 Aug 2019 12:39) (64 - 68)  BP: 127/76 (08 Aug 2019 12:39) (127/76 - 147/103)  BP(mean): --  RR: 17 (08 Aug 2019 12:39) (17 - 18)  SpO2: 100% (08 Aug 2019 12:39) (100% - 100%)    LABS:                        14.8   9.23  )-----------( 259      ( 08 Aug 2019 06:25 )             45.7     08-08    136  |  98  |  26<H>  ----------------------------<  106<H>  4.6   |  27  |  0.84    Ca    9.5      08 Aug 2019 06:25  Phos  3.2     08-08  Mg     2.0     08-08    TPro  6.2  /  Alb  2.8<L>  /  TBili  0.4  /  DBili  x   /  AST  41<H>  /  ALT  43<H>  /  AlkPhos  62  08-08        CAPILLARY BLOOD GLUCOSE                PAST MEDICAL & SURGICAL HISTORY:  Sciatica  S/P ORIF (open reduction internal fixation) fracture      MEDICATIONS  (STANDING):  ceFAZolin   IVPB      ceFAZolin   IVPB 2000 milliGRAM(s) IV Intermittent every 8 hours  gabapentin 300 milliGRAM(s) Oral three times a day  ketorolac   Injectable 15 milliGRAM(s) IV Push every 6 hours  lidocaine   Patch 1 Patch Transdermal daily  lisinopril 10 milliGRAM(s) Oral daily    MEDICATIONS  (PRN):  acetaminophen   Tablet .. 650 milliGRAM(s) Oral every 6 hours PRN Temp greater or equal to 38C (100.4F), Mild Pain (1 - 3)  cloNIDine 0.1 milliGRAM(s) Oral two times a day PRN withdrawl symptoms  hydrOXYzine hydrochloride 50 milliGRAM(s) Oral every 8 hours PRN Anxiety  tiZANidine 4 milliGRAM(s) Oral every 6 hours PRN back pain, muscle spasms        RADIOLOGY & ADDITIONAL TESTS:    Imaging Personally Reviewed:  [ ] YES  [ ] NO    Consultant(s) Notes Reviewed:  [x ] YES  [ ] NO    PHYSICAL EXAM:  GENERAL: NAD, well-groomed, well-developed  HEAD:  Atraumatic, Normocephalic  EYES: EOMI, PERRLA, conjunctiva and sclera clear  ENMT: No tonsillar erythema, exudates, or enlargement; Moist mucous membranes, Good dentition, No lesions  NECK: Supple, No JVD, Normal thyroid  NERVOUS SYSTEM:  Alert & Oriented X3, Good concentration; Motor Strength 5/5 B/L upper and lower extremities; DTRs 2+ intact and symmetric  CHEST/LUNG: Clear to percussion bilaterally; No rales, rhonchi, wheezing, or rubs  HEART: Regular rate and rhythm; No murmurs, rubs, or gallops  ABDOMEN: Soft, Nontender, Nondistended; Bowel sounds present  EXTREMITIES:  2+ Peripheral Pulses, No clubbing, cyanosis, or edema Back-mild tenderness  LYMPH: No lymphadenopathy noted  SKIN: No rashes or lesions    Care Discussed with Consultants/Other Providers [ ] YES  [ ] NO      Code Status: [] Full Code [] DNR [] DNI [] Goals of Care:   Disposition: [] ICU [] Stroke Unit [] RCU []PCU []Floor [] Discharge Home         JEANINE RodriguezP

## 2019-08-08 NOTE — CONSULT NOTE ADULT - ASSESSMENT
54 m with IVDA, hepatitis C (not treated), RLE ORIF and hardware, previous bacteremia and lumbar osteo? 2004, presented with worsening back pain, with difficulty ambulating due to pain.  here afebrile normal WBC  blood cx: 2/2 MSSA  hep C viral load: [id number]  spine MRI: enhancing lesion on L3 s/o occult fx or a lesion but CT negative for FX    MSSA bacteremia and ?L3 osteo in the setting of active heroine use    * repeat 2 sets of blood cultures  * echo  * start cefazolin 2 q 8  * check HIV

## 2019-08-08 NOTE — CONSULT NOTE ADULT - SUBJECTIVE AND OBJECTIVE BOX
HPI:  54 m with IVDA, hepatitis C (not treated), previous bacteremia and lumbar osteo? 2004, presented with worsening back pain, with difficulty ambulating due to pain.  Pt reports a similar episode approx 15 years ago.  Upon further questioning about that epiosde, pt states he was told it was due to the deposition of the impurities of the heroin he used.  States they put a needle in his back, unclear if he had abx during that time.  Pt states he is still using heroin every 3-4 days, about 4 bags at a time.  Reports he is also feeling a little "sick" and has not eaten in 4 days due to using and his pain; currently is hungry.  Pt states he had a neg HIV test about 3 months ago and states he uses clean needles every time he injects.    Pt denies fever, chills, incontinence bowel or bladder (04 Aug 2019 09:08)      PAST MEDICAL & SURGICAL HISTORY:  Sciatica  S/P ORIF (open reduction internal fixation) fracture      Allergies    No Known Allergies    Intolerances        ANTIMICROBIALS:  ceFAZolin   IVPB    ceFAZolin   IVPB 2000 once  ceFAZolin   IVPB 2000 every 8 hours      OTHER MEDS:  acetaminophen   Tablet .. 650 milliGRAM(s) Oral every 6 hours PRN  cloNIDine 0.1 milliGRAM(s) Oral two times a day PRN  gabapentin 300 milliGRAM(s) Oral three times a day  hydrOXYzine hydrochloride 50 milliGRAM(s) Oral every 8 hours PRN  ketorolac   Injectable 15 milliGRAM(s) IV Push every 6 hours  lidocaine   Patch 1 Patch Transdermal daily  lisinopril 10 milliGRAM(s) Oral daily  tiZANidine 4 milliGRAM(s) Oral every 6 hours PRN      SOCIAL HISTORY:  current heroine use, occasional alcohol use    FAMILY HISTORY:  no recent febrile illness in family members    ROS:    All other systems negative     Constitutional: no fever, no chills, no weight loss, no night sweats  Eye: no eye pain, no redness, no vision changes  ENT:  no sore throat, no rhinorrhea  Cardiovascular:  no chest pain, no palpitation  Respiratory:  no SOB, no cough  GI:  no abd pain, no vomiting, no diarrhea  urinary: no dysuria, no hematuria, no flank pain  : no  discharge or bleeding  musculoskeletal:  positive back pain  skin:  no rash  neurology:  no headache, no seizure, no change in mental status  psych: no anxiety, no depression     Physical Exam:    General:    NAD, non toxic  Head: atraumatic, normocephalic  Eyes: normal sclera and conjunctiva  ENT:   no oropharyngeal lesions, no LAD, neck supple  Cardio:    regular S1,S2  Respiratory:   clear b/l, no wheezing  abd:   soft, BS +, not tender, no hepatosplenomegaly  :     no CVAT, no suprapubic tenderness, no romero  Musculoskeletal : RLE scar from ORIF but no tenderness  Skin:    no rash  vascular: no phlebitis, normal pulses  Neurologic:     no focal deficits  psych: normal affect, no suicidal ideation      Drug Dosing Weight  Height (cm): 180.3 (05 Aug 2019 01:15)  Weight (kg): 102.1 (05 Aug 2019 01:15)  BMI (kg/m2): 31.4 (05 Aug 2019 01:15)  BSA (m2): 2.22 (05 Aug 2019 01:15)    Vital Signs Last 24 Hrs  T(F): 98.4 (08-08-19 @ 12:39), Max: 99.9 (08-04-19 @ 08:21)    Vital Signs Last 24 Hrs  HR: 68 (08-08-19 @ 12:39) (64 - 68)  BP: 127/76 (08-08-19 @ 12:39) (127/76 - 147/103)  RR: 17 (08-08-19 @ 12:39)  SpO2: 100% (08-08-19 @ 12:39) (100% - 100%)  Wt(kg): --                          14.8   9.23  )-----------( 259      ( 08 Aug 2019 06:25 )             45.7       08-08    136  |  98  |  26<H>  ----------------------------<  106<H>  4.6   |  27  |  0.84    Ca    9.5      08 Aug 2019 06:25  Phos  3.2     08-08  Mg     2.0     08-08    TPro  6.2  /  Alb  2.8<L>  /  TBili  0.4  /  DBili  x   /  AST  41<H>  /  ALT  43<H>  /  AlkPhos  62  08-08          MICROBIOLOGY:  v  BLOOD  08-07-19 --  --  BLOOD CULTURE PCR                  RADIOLOGY:    Images below reviewed personally  < from: CT Lumbar Spine No Cont (08.07.19 @ 12:16) >  There is no definite evidence of a fracture or abnormal lesion seen   involving the L3 vertebral body. Clinical correlation continued close   interval follow-up is recommended.    There is sclerotic changes seen involving the S1 vertebral body. This is   seen centrally and does correspond to area of T1 prolongation. This too   could be compatible with an occult fracture. Evaluation of the paraspinal   soft tissues is limited the lack of IV contrast though grossly   unremarkable    Impression: No fractures or abnormal lesion is seen involving the L3   vertebral body. Sclerotic changes are seen involving the S1 vertebral   body.          < from: MR Lumbar Spine w/wo IV Cont (08.05.19 @ 22:12) >    IMPRESSION:   Nonspecific abnormal signal of the L3 vertebral body that is hypointense   on T1-weighted imaging and slightly enhancing. This may represent occult   fracture though underlying lesion cannot entirely excluded. Continued   close interval follow-up until resolution is recommended. The possibility   of underlying infection cannot be entirely excluded as well though is   less likely given the pattern of abnormal signal and enhancement.

## 2019-08-09 LAB
ALBUMIN SERPL ELPH-MCNC: 3 G/DL — LOW (ref 3.3–5)
ALP SERPL-CCNC: 66 U/L — SIGNIFICANT CHANGE UP (ref 40–120)
ALT FLD-CCNC: 37 U/L — SIGNIFICANT CHANGE UP (ref 4–41)
ANION GAP SERPL CALC-SCNC: 11 MMO/L — SIGNIFICANT CHANGE UP (ref 7–14)
AST SERPL-CCNC: 37 U/L — SIGNIFICANT CHANGE UP (ref 4–40)
BASOPHILS # BLD AUTO: 0.04 K/UL — SIGNIFICANT CHANGE UP (ref 0–0.2)
BASOPHILS NFR BLD AUTO: 0.4 % — SIGNIFICANT CHANGE UP (ref 0–2)
BILIRUB SERPL-MCNC: 0.5 MG/DL — SIGNIFICANT CHANGE UP (ref 0.2–1.2)
BUN SERPL-MCNC: 25 MG/DL — HIGH (ref 7–23)
CALCIUM SERPL-MCNC: 9.1 MG/DL — SIGNIFICANT CHANGE UP (ref 8.4–10.5)
CHLORIDE SERPL-SCNC: 100 MMOL/L — SIGNIFICANT CHANGE UP (ref 98–107)
CO2 SERPL-SCNC: 26 MMOL/L — SIGNIFICANT CHANGE UP (ref 22–31)
CREAT SERPL-MCNC: 0.81 MG/DL — SIGNIFICANT CHANGE UP (ref 0.5–1.3)
EOSINOPHIL # BLD AUTO: 0.13 K/UL — SIGNIFICANT CHANGE UP (ref 0–0.5)
EOSINOPHIL NFR BLD AUTO: 1.3 % — SIGNIFICANT CHANGE UP (ref 0–6)
GLUCOSE SERPL-MCNC: 97 MG/DL — SIGNIFICANT CHANGE UP (ref 70–99)
HCT VFR BLD CALC: 44.8 % — SIGNIFICANT CHANGE UP (ref 39–50)
HGB BLD-MCNC: 14.7 G/DL — SIGNIFICANT CHANGE UP (ref 13–17)
IMM GRANULOCYTES NFR BLD AUTO: 0.5 % — SIGNIFICANT CHANGE UP (ref 0–1.5)
LYMPHOCYTES # BLD AUTO: 2.12 K/UL — SIGNIFICANT CHANGE UP (ref 1–3.3)
LYMPHOCYTES # BLD AUTO: 20.9 % — SIGNIFICANT CHANGE UP (ref 13–44)
MAGNESIUM SERPL-MCNC: 2.2 MG/DL — SIGNIFICANT CHANGE UP (ref 1.6–2.6)
MCHC RBC-ENTMCNC: 30.1 PG — SIGNIFICANT CHANGE UP (ref 27–34)
MCHC RBC-ENTMCNC: 32.8 % — SIGNIFICANT CHANGE UP (ref 32–36)
MCV RBC AUTO: 91.8 FL — SIGNIFICANT CHANGE UP (ref 80–100)
MONOCYTES # BLD AUTO: 1.11 K/UL — HIGH (ref 0–0.9)
MONOCYTES NFR BLD AUTO: 10.9 % — SIGNIFICANT CHANGE UP (ref 2–14)
NEUTROPHILS # BLD AUTO: 6.7 K/UL — SIGNIFICANT CHANGE UP (ref 1.8–7.4)
NEUTROPHILS NFR BLD AUTO: 66 % — SIGNIFICANT CHANGE UP (ref 43–77)
NRBC # FLD: 0 K/UL — SIGNIFICANT CHANGE UP (ref 0–0)
ORGANISM # SPEC MICROSCOPIC CNT: SIGNIFICANT CHANGE UP
ORGANISM # SPEC MICROSCOPIC CNT: SIGNIFICANT CHANGE UP
PHOSPHATE SERPL-MCNC: 3.5 MG/DL — SIGNIFICANT CHANGE UP (ref 2.5–4.5)
PLATELET # BLD AUTO: 296 K/UL — SIGNIFICANT CHANGE UP (ref 150–400)
PMV BLD: 11.3 FL — SIGNIFICANT CHANGE UP (ref 7–13)
POTASSIUM SERPL-MCNC: 4.2 MMOL/L — SIGNIFICANT CHANGE UP (ref 3.5–5.3)
POTASSIUM SERPL-SCNC: 4.2 MMOL/L — SIGNIFICANT CHANGE UP (ref 3.5–5.3)
PROT SERPL-MCNC: 6.4 G/DL — SIGNIFICANT CHANGE UP (ref 6–8.3)
RBC # BLD: 4.88 M/UL — SIGNIFICANT CHANGE UP (ref 4.2–5.8)
RBC # FLD: 13.8 % — SIGNIFICANT CHANGE UP (ref 10.3–14.5)
SODIUM SERPL-SCNC: 137 MMOL/L — SIGNIFICANT CHANGE UP (ref 135–145)
SPECIMEN SOURCE: SIGNIFICANT CHANGE UP
WBC # BLD: 10.15 K/UL — SIGNIFICANT CHANGE UP (ref 3.8–10.5)
WBC # FLD AUTO: 10.15 K/UL — SIGNIFICANT CHANGE UP (ref 3.8–10.5)

## 2019-08-09 PROCEDURE — 99233 SBSQ HOSP IP/OBS HIGH 50: CPT | Mod: GC

## 2019-08-09 RX ORDER — IBUPROFEN 200 MG
600 TABLET ORAL ONCE
Refills: 0 | Status: COMPLETED | OUTPATIENT
Start: 2019-08-09 | End: 2019-08-09

## 2019-08-09 RX ORDER — PANTOPRAZOLE SODIUM 20 MG/1
40 TABLET, DELAYED RELEASE ORAL
Refills: 0 | Status: DISCONTINUED | OUTPATIENT
Start: 2019-08-09 | End: 2019-09-06

## 2019-08-09 RX ORDER — KETOROLAC TROMETHAMINE 30 MG/ML
30 SYRINGE (ML) INJECTION EVERY 6 HOURS
Refills: 0 | Status: DISCONTINUED | OUTPATIENT
Start: 2019-08-09 | End: 2019-08-10

## 2019-08-09 RX ORDER — ACETAMINOPHEN 500 MG
1000 TABLET ORAL ONCE
Refills: 0 | Status: COMPLETED | OUTPATIENT
Start: 2019-08-09 | End: 2019-08-09

## 2019-08-09 RX ADMIN — Medication 100 MILLIGRAM(S): at 23:29

## 2019-08-09 RX ADMIN — Medication 1000 MILLIGRAM(S): at 06:30

## 2019-08-09 RX ADMIN — Medication 400 MILLIGRAM(S): at 05:52

## 2019-08-09 RX ADMIN — Medication 15 MILLIGRAM(S): at 12:00

## 2019-08-09 RX ADMIN — LIDOCAINE 1 PATCH: 4 CREAM TOPICAL at 20:56

## 2019-08-09 RX ADMIN — Medication 30 MILLIGRAM(S): at 23:29

## 2019-08-09 RX ADMIN — TIZANIDINE 4 MILLIGRAM(S): 4 TABLET ORAL at 20:55

## 2019-08-09 RX ADMIN — LISINOPRIL 10 MILLIGRAM(S): 2.5 TABLET ORAL at 12:07

## 2019-08-09 RX ADMIN — Medication 100 MILLIGRAM(S): at 06:28

## 2019-08-09 RX ADMIN — Medication 15 MILLIGRAM(S): at 00:46

## 2019-08-09 RX ADMIN — LIDOCAINE 1 PATCH: 4 CREAM TOPICAL at 23:28

## 2019-08-09 RX ADMIN — GABAPENTIN 300 MILLIGRAM(S): 400 CAPSULE ORAL at 05:42

## 2019-08-09 RX ADMIN — Medication 15 MILLIGRAM(S): at 00:16

## 2019-08-09 RX ADMIN — Medication 1 TABLET(S): at 03:35

## 2019-08-09 RX ADMIN — TIZANIDINE 4 MILLIGRAM(S): 4 TABLET ORAL at 14:08

## 2019-08-09 RX ADMIN — Medication 15 MILLIGRAM(S): at 11:29

## 2019-08-09 RX ADMIN — Medication 30 MILLIGRAM(S): at 17:09

## 2019-08-09 RX ADMIN — Medication 600 MILLIGRAM(S): at 07:00

## 2019-08-09 RX ADMIN — Medication 15 MILLIGRAM(S): at 06:15

## 2019-08-09 RX ADMIN — GABAPENTIN 300 MILLIGRAM(S): 400 CAPSULE ORAL at 21:01

## 2019-08-09 RX ADMIN — Medication 15 MILLIGRAM(S): at 05:42

## 2019-08-09 RX ADMIN — GABAPENTIN 300 MILLIGRAM(S): 400 CAPSULE ORAL at 16:33

## 2019-08-09 RX ADMIN — Medication 1 TABLET(S): at 04:35

## 2019-08-09 RX ADMIN — Medication 600 MILLIGRAM(S): at 05:51

## 2019-08-09 RX ADMIN — Medication 100 MILLIGRAM(S): at 16:33

## 2019-08-09 RX ADMIN — LIDOCAINE 1 PATCH: 4 CREAM TOPICAL at 11:29

## 2019-08-09 RX ADMIN — Medication 30 MILLIGRAM(S): at 18:09

## 2019-08-09 NOTE — PROGRESS NOTE ADULT - ASSESSMENT
54 m with IVDA, hepatitis C (not treated), RLE ORIF and hardware, previous bacteremia and lumbar osteo? 2004, presented with worsening back pain, with difficulty ambulating due to pain.  here afebrile normal WBC  blood cx: 2/2 MSSA  hep C viral load: [id number]  spine MRI: enhancing lesion on L3 s/o occult fx or a lesion but CT negative for FX    MSSA bacteremia and ?L3 osteo in the setting of active heroine use  repeat 8/8 also positive  * repeat 2 sets of blood cultures q 48 until clear  * echo  * c/w cefazolin 2 q 8  * f/u HIV

## 2019-08-09 NOTE — PROGRESS NOTE ADULT - SUBJECTIVE AND OBJECTIVE BOX
Follow Up:  MSSA bacteremia    Interval History: pt stable and afebrile, the repeat cx also positive    ROS:      All other systems negative    Constitutional: no fever, no chills, no weight loss, no night sweats  Eye: no eye pain, no redness, no vision changes  ENT:  no sore throat, no rhinorrhea  Cardiovascular:  no chest pain, no palpitation  Respiratory:  no SOB, no cough  GI:  no abd pain, no vomiting, no diarrhea  urinary: no dysuria, no hematuria, no flank pain  : no penile discharge or bleeding  musculoskeletal:  positive back pain  skin:  no rash  neurology:  no headache, no seizure, no change in mental status          Allergies  No Known Allergies        ANTIMICROBIALS:  ceFAZolin   IVPB    ceFAZolin   IVPB 2000 every 8 hours      OTHER MEDS:  acetaminophen   Tablet .. 650 milliGRAM(s) Oral every 6 hours PRN  cloNIDine 0.1 milliGRAM(s) Oral two times a day PRN  gabapentin 300 milliGRAM(s) Oral three times a day  hydrOXYzine hydrochloride 50 milliGRAM(s) Oral every 8 hours PRN  ketorolac   Injectable 30 milliGRAM(s) IV Push every 6 hours  lidocaine   Patch 1 Patch Transdermal daily  lisinopril 10 milliGRAM(s) Oral daily  pantoprazole    Tablet 40 milliGRAM(s) Oral before breakfast  tiZANidine 4 milliGRAM(s) Oral every 6 hours PRN      Vital Signs Last 24 Hrs  T(C): 36.9 (09 Aug 2019 12:08), Max: 37.7 (08 Aug 2019 20:34)  T(F): 98.5 (09 Aug 2019 12:08), Max: 99.9 (08 Aug 2019 20:34)  HR: 70 (09 Aug 2019 12:08) (66 - 70)  BP: 134/90 (09 Aug 2019 12:08) (124/78 - 149/92)  BP(mean): --  RR: 18 (09 Aug 2019 12:08) (16 - 18)  SpO2: 99% (09 Aug 2019 12:08) (99% - 100%)    Physical Exam:  General:    NAD, non toxic  Head: atraumatic, normocephalic  Eyes: normal sclera and conjunctiva  ENT:   no oropharyngeal lesions, no LAD, neck supple  Cardio:    regular S1,S2  Respiratory:   clear b/l, no wheezing  abd:   soft, BS +, not tender, no hepatosplenomegaly  :     no CVAT, no suprapubic tenderness, no romero  Musculoskeletal : RLE scar from ORIF but no tenderness  Skin:    no rash  vascular: no phlebitis, normal pulses  Neurologic:     no focal deficits  psych: normal affect                          14.7   10.15 )-----------( 296      ( 09 Aug 2019 05:27 )             44.8       08-09    137  |  100  |  25<H>  ----------------------------<  97  4.2   |  26  |  0.81    Ca    9.1      09 Aug 2019 05:27  Phos  3.5     08-09  Mg     2.2     08-09    TPro  6.4  /  Alb  3.0<L>  /  TBili  0.5  /  DBili  x   /  AST  37  /  ALT  37  /  AlkPhos  66  08-09          MICROBIOLOGY:  v  BLOOD  08-08-19 --  --  --      BLOOD  08-07-19 --  --  BLOOD CULTURE PCR  Staphylococcus aureus                RADIOLOGY:  Images below reviewed personally  < from: CT Lumbar Spine No Cont (08.07.19 @ 12:16) >  Impression: No fractures or abnormal lesion is seen involving the L3   vertebral body. Sclerotic changes are seen involving the S1 vertebral   body.      < from: MR Lumbar Spine w/wo IV Cont (08.05.19 @ 22:12) >  IMPRESSION:   Nonspecific abnormal signal of the L3 vertebral body that is hypointense   on T1-weighted imaging and slightly enhancing. This may represent occult   fracture though underlying lesion cannot entirely excluded. Continued   close interval follow-up until resolution is recommended. The possibility   of underlying infection cannot be entirely excluded as well though is   less likely given the pattern of abnormal signal and enhancement.

## 2019-08-09 NOTE — PROGRESS NOTE ADULT - SUBJECTIVE AND OBJECTIVE BOX
MARCIO LUZ  54y  Male      Patient is a 54y old  Male who presents with a chief complaint of back pain and sciatica (09 Aug 2019 17:12)  comfortable,nad,concern about pain meds/no sob,no cp,no fever.c/o Back pain on and off    REVIEW OF SYSTEMS:  as above      INTERVAL HPI/OVERNIGHT EVENTS:  T(C): 37.2 (08-09-19 @ 20:40), Max: 37.4 (08-09-19 @ 05:37)  HR: 72 (08-09-19 @ 20:40) (68 - 72)  BP: 118/78 (08-09-19 @ 20:47) (118/78 - 149/92)  RR: 17 (08-09-19 @ 20:40) (16 - 18)  SpO2: 99% (08-09-19 @ 20:40) (99% - 100%)  Wt(kg): --  I&O's Summary    08 Aug 2019 07:01  -  09 Aug 2019 07:00  --------------------------------------------------------  IN: 50 mL / OUT: 900 mL / NET: -850 mL    09 Aug 2019 07:01  -  09 Aug 2019 23:35  --------------------------------------------------------  IN: 0 mL / OUT: 1000 mL / NET: -1000 mL      T(C): 37.2 (08-09-19 @ 20:40), Max: 37.4 (08-09-19 @ 05:37)  HR: 72 (08-09-19 @ 20:40) (68 - 72)  BP: 118/78 (08-09-19 @ 20:47) (118/78 - 149/92)  RR: 17 (08-09-19 @ 20:40) (16 - 18)  SpO2: 99% (08-09-19 @ 20:40) (99% - 100%)  Wt(kg): --Vital Signs Last 24 Hrs  T(C): 37.2 (09 Aug 2019 20:40), Max: 37.4 (09 Aug 2019 05:37)  T(F): 98.9 (09 Aug 2019 20:40), Max: 99.3 (09 Aug 2019 05:37)  HR: 72 (09 Aug 2019 20:40) (68 - 72)  BP: 118/78 (09 Aug 2019 20:47) (118/78 - 149/92)  BP(mean): --  RR: 17 (09 Aug 2019 20:40) (16 - 18)  SpO2: 99% (09 Aug 2019 20:40) (99% - 100%)    LABS:                        14.7   10.15 )-----------( 296      ( 09 Aug 2019 05:27 )             44.8     08-09    137  |  100  |  25<H>  ----------------------------<  97  4.2   |  26  |  0.81    Ca    9.1      09 Aug 2019 05:27  Phos  3.5     08-09  Mg     2.2     08-09    TPro  6.4  /  Alb  3.0<L>  /  TBili  0.5  /  DBili  x   /  AST  37  /  ALT  37  /  AlkPhos  66  08-09        CAPILLARY BLOOD GLUCOSE                PAST MEDICAL & SURGICAL HISTORY:  Sciatica  S/P ORIF (open reduction internal fixation) fracture      MEDICATIONS  (STANDING):  ceFAZolin   IVPB      ceFAZolin   IVPB 2000 milliGRAM(s) IV Intermittent every 8 hours  gabapentin 300 milliGRAM(s) Oral three times a day  ketorolac   Injectable 30 milliGRAM(s) IV Push every 6 hours  lidocaine   Patch 1 Patch Transdermal daily  lisinopril 10 milliGRAM(s) Oral daily  pantoprazole    Tablet 40 milliGRAM(s) Oral before breakfast    MEDICATIONS  (PRN):  acetaminophen   Tablet .. 650 milliGRAM(s) Oral every 6 hours PRN Temp greater or equal to 38C (100.4F), Mild Pain (1 - 3)  cloNIDine 0.1 milliGRAM(s) Oral two times a day PRN withdrawl symptoms  hydrOXYzine hydrochloride 50 milliGRAM(s) Oral every 8 hours PRN Anxiety  tiZANidine 4 milliGRAM(s) Oral every 6 hours PRN back pain, muscle spasms        RADIOLOGY & ADDITIONAL TESTS:    Imaging Personally Reviewed:  [ ] YES  [ ] NO    Consultant(s) Notes Reviewed:  [x ] YES  [ ] NO    PHYSICAL EXAM:  GENERAL: NAD, well-groomed, well-developed  HEAD:  Atraumatic, Normocephalic  EYES: EOMI, PERRLA, conjunctiva and sclera clear  ENMT: No tonsillar erythema, exudates, or enlargement; Moist mucous membranes, Good dentition, No lesions  NECK: Supple, No JVD, Normal thyroid  NERVOUS SYSTEM:  Alert & Oriented X3, Good concentration; Motor Strength 5/5 B/L upper and lower extremities; DTRs 2+ intact and symmetric  CHEST/LUNG: Clear to percussion bilaterally; No rales, rhonchi, wheezing, or rubs  HEART: Regular rate and rhythm; No murmurs, rubs, or gallops  ABDOMEN: Soft, Nontender, Nondistended; Bowel sounds present  EXTREMITIES:  2+ Peripheral Pulses, No clubbing, cyanosis, or edema  LYMPH: No lymphadenopathy noted  SKIN: No rashes or lesions    Care Discussed with Consultants/Other Providers [ ] YES  [ ] NO      Code Status: [] Full Code [] DNR [] DNI [] Goals of Care:   Disposition: [] ICU [] Stroke Unit [] RCU []PCU []Floor [] Discharge Home         JEANINE RodriguezP

## 2019-08-09 NOTE — PROGRESS NOTE ADULT - SUBJECTIVE AND OBJECTIVE BOX
LUZ BUCKLEY:[id number],   54yMale followed for:  No Known Allergies    PAST MEDICAL & SURGICAL HISTORY:  Sciatica  S/P ORIF (open reduction internal fixation) fracture    FAMILY HISTORY:    MEDICATIONS  (STANDING):  ceFAZolin   IVPB      ceFAZolin   IVPB 2000 milliGRAM(s) IV Intermittent every 8 hours  gabapentin 300 milliGRAM(s) Oral three times a day  ketorolac   Injectable 30 milliGRAM(s) IV Push every 6 hours  lidocaine   Patch 1 Patch Transdermal daily  lisinopril 10 milliGRAM(s) Oral daily  pantoprazole    Tablet 40 milliGRAM(s) Oral before breakfast    MEDICATIONS  (PRN):  acetaminophen   Tablet .. 650 milliGRAM(s) Oral every 6 hours PRN Temp greater or equal to 38C (100.4F), Mild Pain (1 - 3)  cloNIDine 0.1 milliGRAM(s) Oral two times a day PRN withdrawl symptoms  hydrOXYzine hydrochloride 50 milliGRAM(s) Oral every 8 hours PRN Anxiety  tiZANidine 4 milliGRAM(s) Oral every 6 hours PRN back pain, muscle spasms      Vital Signs Last 24 Hrs  T(C): 36.9 (09 Aug 2019 12:08), Max: 37.7 (08 Aug 2019 20:34)  T(F): 98.5 (09 Aug 2019 12:08), Max: 99.9 (08 Aug 2019 20:34)  HR: 70 (09 Aug 2019 12:08) (66 - 70)  BP: 134/90 (09 Aug 2019 12:08) (124/78 - 149/92)  BP(mean): --  RR: 18 (09 Aug 2019 12:08) (16 - 18)  SpO2: 99% (09 Aug 2019 12:08) (99% - 100%)  nc/at  s1s2  cta  soft, nt, nd no guarding or rebound  no c/c/e    CBC Full  -  ( 09 Aug 2019 05:27 )  WBC Count : 10.15 K/uL  RBC Count : 4.88 M/uL  Hemoglobin : 14.7 g/dL  Hematocrit : 44.8 %  Platelet Count - Automated : 296 K/uL  Mean Cell Volume : 91.8 fL  Mean Cell Hemoglobin : 30.1 pg  Mean Cell Hemoglobin Concentration : 32.8 %  Auto Neutrophil # : 6.70 K/uL  Auto Lymphocyte # : 2.12 K/uL  Auto Monocyte # : 1.11 K/uL  Auto Eosinophil # : 0.13 K/uL  Auto Basophil # : 0.04 K/uL  Auto Neutrophil % : 66.0 %  Auto Lymphocyte % : 20.9 %  Auto Monocyte % : 10.9 %  Auto Eosinophil % : 1.3 %  Auto Basophil % : 0.4 %    08-09    137  |  100  |  25<H>  ----------------------------<  97  4.2   |  26  |  0.81    Ca    9.1      09 Aug 2019 05:27  Phos  3.5     08-09  Mg     2.2     08-09    TPro  6.4  /  Alb  3.0<L>  /  TBili  0.5  /  DBili  x   /  AST  37  /  ALT  37  /  AlkPhos  66  08-09

## 2019-08-09 NOTE — PROGRESS NOTE ADULT - ASSESSMENT
patient with hepatitis c.  will need genotype.  will discuss treatment in am.  full consult to follow

## 2019-08-10 LAB
-  CEFAZOLIN: SIGNIFICANT CHANGE UP
-  CIPROFLOXACIN: SIGNIFICANT CHANGE UP
-  CLINDAMYCIN: SIGNIFICANT CHANGE UP
-  ERYTHROMYCIN: SIGNIFICANT CHANGE UP
-  GENTAMICIN: SIGNIFICANT CHANGE UP
-  LEVOFLOXACIN: SIGNIFICANT CHANGE UP
-  MOXIFLOXACIN(AEROBIC): SIGNIFICANT CHANGE UP
-  OXACILLIN: SIGNIFICANT CHANGE UP
-  RIFAMPIN.: SIGNIFICANT CHANGE UP
-  TETRACYCLINE: SIGNIFICANT CHANGE UP
-  TRIMETHOPRIM/SULFAMETHOXAZOLE: SIGNIFICANT CHANGE UP
-  VANCOMYCIN: SIGNIFICANT CHANGE UP
ANION GAP SERPL CALC-SCNC: 10 MMO/L — SIGNIFICANT CHANGE UP (ref 7–14)
BACTERIA BLD CULT: SIGNIFICANT CHANGE UP
BUN SERPL-MCNC: 20 MG/DL — SIGNIFICANT CHANGE UP (ref 7–23)
CALCIUM SERPL-MCNC: 8.8 MG/DL — SIGNIFICANT CHANGE UP (ref 8.4–10.5)
CHLORIDE SERPL-SCNC: 104 MMOL/L — SIGNIFICANT CHANGE UP (ref 98–107)
CO2 SERPL-SCNC: 25 MMOL/L — SIGNIFICANT CHANGE UP (ref 22–31)
CREAT SERPL-MCNC: 0.83 MG/DL — SIGNIFICANT CHANGE UP (ref 0.5–1.3)
GLUCOSE SERPL-MCNC: 105 MG/DL — HIGH (ref 70–99)
HCT VFR BLD CALC: 43.7 % — SIGNIFICANT CHANGE UP (ref 39–50)
HGB BLD-MCNC: 14.1 G/DL — SIGNIFICANT CHANGE UP (ref 13–17)
HIV 1+2 AB+HIV1 P24 AG SERPL QL IA: SIGNIFICANT CHANGE UP
MAGNESIUM SERPL-MCNC: 2.1 MG/DL — SIGNIFICANT CHANGE UP (ref 1.6–2.6)
MCHC RBC-ENTMCNC: 29.6 PG — SIGNIFICANT CHANGE UP (ref 27–34)
MCHC RBC-ENTMCNC: 32.3 % — SIGNIFICANT CHANGE UP (ref 32–36)
MCV RBC AUTO: 91.8 FL — SIGNIFICANT CHANGE UP (ref 80–100)
METHOD TYPE: SIGNIFICANT CHANGE UP
METHOD TYPE: SIGNIFICANT CHANGE UP
MSSA DNA SPEC QL NAA+PROBE: SIGNIFICANT CHANGE UP
NRBC # FLD: 0.05 K/UL — SIGNIFICANT CHANGE UP (ref 0–0)
ORGANISM # SPEC MICROSCOPIC CNT: SIGNIFICANT CHANGE UP
PHOSPHATE SERPL-MCNC: 3.3 MG/DL — SIGNIFICANT CHANGE UP (ref 2.5–4.5)
PLATELET # BLD AUTO: 325 K/UL — SIGNIFICANT CHANGE UP (ref 150–400)
PMV BLD: 10.9 FL — SIGNIFICANT CHANGE UP (ref 7–13)
POTASSIUM SERPL-MCNC: 4.4 MMOL/L — SIGNIFICANT CHANGE UP (ref 3.5–5.3)
POTASSIUM SERPL-SCNC: 4.4 MMOL/L — SIGNIFICANT CHANGE UP (ref 3.5–5.3)
RBC # BLD: 4.76 M/UL — SIGNIFICANT CHANGE UP (ref 4.2–5.8)
RBC # FLD: 13.8 % — SIGNIFICANT CHANGE UP (ref 10.3–14.5)
SODIUM SERPL-SCNC: 139 MMOL/L — SIGNIFICANT CHANGE UP (ref 135–145)
WBC # BLD: 8.97 K/UL — SIGNIFICANT CHANGE UP (ref 3.8–10.5)
WBC # FLD AUTO: 8.97 K/UL — SIGNIFICANT CHANGE UP (ref 3.8–10.5)

## 2019-08-10 RX ORDER — KETOROLAC TROMETHAMINE 30 MG/ML
30 SYRINGE (ML) INJECTION ONCE
Refills: 0 | Status: DISCONTINUED | OUTPATIENT
Start: 2019-08-10 | End: 2019-08-11

## 2019-08-10 RX ORDER — IBUPROFEN 200 MG
800 TABLET ORAL ONCE
Refills: 0 | Status: DISCONTINUED | OUTPATIENT
Start: 2019-08-10 | End: 2019-08-10

## 2019-08-10 RX ORDER — KETOROLAC TROMETHAMINE 30 MG/ML
30 SYRINGE (ML) INJECTION ONCE
Refills: 0 | Status: DISCONTINUED | OUTPATIENT
Start: 2019-08-10 | End: 2019-08-10

## 2019-08-10 RX ADMIN — PANTOPRAZOLE SODIUM 40 MILLIGRAM(S): 20 TABLET, DELAYED RELEASE ORAL at 06:08

## 2019-08-10 RX ADMIN — Medication 100 MILLIGRAM(S): at 18:01

## 2019-08-10 RX ADMIN — Medication 30 MILLIGRAM(S): at 06:08

## 2019-08-10 RX ADMIN — GABAPENTIN 300 MILLIGRAM(S): 400 CAPSULE ORAL at 06:08

## 2019-08-10 RX ADMIN — GABAPENTIN 300 MILLIGRAM(S): 400 CAPSULE ORAL at 21:35

## 2019-08-10 RX ADMIN — Medication 30 MILLIGRAM(S): at 18:31

## 2019-08-10 RX ADMIN — Medication 30 MILLIGRAM(S): at 06:40

## 2019-08-10 RX ADMIN — Medication 1 TABLET(S): at 01:30

## 2019-08-10 RX ADMIN — Medication 30 MILLIGRAM(S): at 13:22

## 2019-08-10 RX ADMIN — Medication 100 MILLIGRAM(S): at 09:09

## 2019-08-10 RX ADMIN — Medication 30 MILLIGRAM(S): at 00:00

## 2019-08-10 RX ADMIN — Medication 30 MILLIGRAM(S): at 12:52

## 2019-08-10 RX ADMIN — Medication 1 TABLET(S): at 00:27

## 2019-08-10 NOTE — PROGRESS NOTE ADULT - SUBJECTIVE AND OBJECTIVE BOX
LUZ BUCKLEY:[id number],   54yMale followed for:  No Known Allergies    PAST MEDICAL & SURGICAL HISTORY:  Sciatica  S/P ORIF (open reduction internal fixation) fracture    FAMILY HISTORY:    MEDICATIONS  (STANDING):  ceFAZolin   IVPB      ceFAZolin   IVPB 2000 milliGRAM(s) IV Intermittent every 8 hours  gabapentin 300 milliGRAM(s) Oral three times a day  ketorolac   Injectable 30 milliGRAM(s) IV Push every 6 hours  lidocaine   Patch 1 Patch Transdermal daily  lisinopril 10 milliGRAM(s) Oral daily  pantoprazole    Tablet 40 milliGRAM(s) Oral before breakfast    MEDICATIONS  (PRN):  acetaminophen   Tablet .. 650 milliGRAM(s) Oral every 6 hours PRN Temp greater or equal to 38C (100.4F), Mild Pain (1 - 3)  cloNIDine 0.1 milliGRAM(s) Oral two times a day PRN withdrawl symptoms  hydrOXYzine hydrochloride 50 milliGRAM(s) Oral every 8 hours PRN Anxiety  tiZANidine 4 milliGRAM(s) Oral every 6 hours PRN back pain, muscle spasms      Vital Signs Last 24 Hrs  T(C): 36.9 (10 Aug 2019 06:05), Max: 37.2 (09 Aug 2019 20:40)  T(F): 98.5 (10 Aug 2019 06:05), Max: 98.9 (09 Aug 2019 20:40)  HR: 62 (10 Aug 2019 06:05) (62 - 72)  BP: 123/86 (10 Aug 2019 06:05) (118/78 - 144/104)  BP(mean): --  RR: 16 (10 Aug 2019 06:05) (16 - 18)  SpO2: 99% (10 Aug 2019 06:05) (99% - 99%)  nc/at  s1s2  cta  soft, nt, nd no guarding or rebound  no c/c/e    CBC Full  -  ( 10 Aug 2019 06:00 )  WBC Count : 8.97 K/uL  RBC Count : 4.76 M/uL  Hemoglobin : 14.1 g/dL  Hematocrit : 43.7 %  Platelet Count - Automated : 325 K/uL  Mean Cell Volume : 91.8 fL  Mean Cell Hemoglobin : 29.6 pg  Mean Cell Hemoglobin Concentration : 32.3 %  Auto Neutrophil # : x  Auto Lymphocyte # : x  Auto Monocyte # : x  Auto Eosinophil # : x  Auto Basophil # : x  Auto Neutrophil % : x  Auto Lymphocyte % : x  Auto Monocyte % : x  Auto Eosinophil % : x  Auto Basophil % : x    08-10    139  |  104  |  20  ----------------------------<  105<H>  4.4   |  25  |  0.83    Ca    8.8      10 Aug 2019 06:00  Phos  3.3     08-10  Mg     2.1     08-10    TPro  6.4  /  Alb  3.0<L>  /  TBili  0.5  /  DBili  x   /  AST  37  /  ALT  37  /  AlkPhos  66  08-09

## 2019-08-10 NOTE — PROGRESS NOTE ADULT - SUBJECTIVE AND OBJECTIVE BOX
LUZ BUCKLEY  54y  Male      Patient is a 54y old  Male who presents with a chief complaint of back pain and sciatica (10 Aug 2019 10:29)  c/o severe pain in back-8/10? ibuprofen not helping?no fever,no sob,no cp,no cough    REVIEW OF SYSTEMS:  neg      INTERVAL HPI/OVERNIGHT EVENTS:  T(C): 37.1 (08-10-19 @ 12:51), Max: 37.2 (08-09-19 @ 20:40)  HR: 72 (08-10-19 @ 12:51) (62 - 72)  BP: 121/88 (08-10-19 @ 12:51) (118/78 - 144/104)  RR: 18 (08-10-19 @ 12:51) (16 - 18)  SpO2: 100% (08-10-19 @ 12:51) (99% - 100%)  Wt(kg): --  I&O's Summary    09 Aug 2019 07:01  -  10 Aug 2019 07:00  --------------------------------------------------------  IN: 0 mL / OUT: 1000 mL / NET: -1000 mL      T(C): 37.1 (08-10-19 @ 12:51), Max: 37.2 (08-09-19 @ 20:40)  HR: 72 (08-10-19 @ 12:51) (62 - 72)  BP: 121/88 (08-10-19 @ 12:51) (118/78 - 144/104)  RR: 18 (08-10-19 @ 12:51) (16 - 18)  SpO2: 100% (08-10-19 @ 12:51) (99% - 100%)  Wt(kg): --Vital Signs Last 24 Hrs  T(C): 37.1 (10 Aug 2019 12:51), Max: 37.2 (09 Aug 2019 20:40)  T(F): 98.8 (10 Aug 2019 12:51), Max: 98.9 (09 Aug 2019 20:40)  HR: 72 (10 Aug 2019 12:51) (62 - 72)  BP: 121/88 (10 Aug 2019 12:51) (118/78 - 144/104)  BP(mean): --  RR: 18 (10 Aug 2019 12:51) (16 - 18)  SpO2: 100% (10 Aug 2019 12:51) (99% - 100%)    LABS:                        14.1   8.97  )-----------( 325      ( 10 Aug 2019 06:00 )             43.7     08-10    139  |  104  |  20  ----------------------------<  105<H>  4.4   |  25  |  0.83    Ca    8.8      10 Aug 2019 06:00  Phos  3.3     08-10  Mg     2.1     08-10    TPro  6.4  /  Alb  3.0<L>  /  TBili  0.5  /  DBili  x   /  AST  37  /  ALT  37  /  AlkPhos  66  08-09        CAPILLARY BLOOD GLUCOSE                PAST MEDICAL & SURGICAL HISTORY:  Sciatica  S/P ORIF (open reduction internal fixation) fracture      MEDICATIONS  (STANDING):  ceFAZolin   IVPB      ceFAZolin   IVPB 2000 milliGRAM(s) IV Intermittent every 8 hours  gabapentin 300 milliGRAM(s) Oral three times a day  lidocaine   Patch 1 Patch Transdermal daily  lisinopril 10 milliGRAM(s) Oral daily  pantoprazole    Tablet 40 milliGRAM(s) Oral before breakfast    MEDICATIONS  (PRN):  acetaminophen   Tablet .. 650 milliGRAM(s) Oral every 6 hours PRN Temp greater or equal to 38C (100.4F), Mild Pain (1 - 3)  cloNIDine 0.1 milliGRAM(s) Oral two times a day PRN withdrawl symptoms  hydrOXYzine hydrochloride 50 milliGRAM(s) Oral every 8 hours PRN Anxiety  ibuprofen  Tablet. 800 milliGRAM(s) Oral once PRN Mild Pain (1 - 3)  tiZANidine 4 milliGRAM(s) Oral every 6 hours PRN back pain, muscle spasms        RADIOLOGY & ADDITIONAL TESTS:    Imaging Personally Reviewed:  [ ] YES  [ ] NO    Consultant(s) Notes Reviewed:  [ x] YES  [ ] NO    PHYSICAL EXAM:  GENERAL: NAD, well-groomed, well-developed  HEAD:  Atraumatic, Normocephalic  EYES: EOMI, PERRLA, conjunctiva and sclera clear  ENMT: No tonsillar erythema, exudates, or enlargement; Moist mucous membranes, Good dentition, No lesions  NECK: Supple, No JVD, Normal thyroid  NERVOUS SYSTEM:  Alert & Oriented X3, Good concentration; Motor Strength 5/5 B/L upper and lower extremities; DTRs 2+ intact and symmetric  CHEST/LUNG: Clear to percussion bilaterally; No rales, rhonchi, wheezing, or rubs  HEART: Regular rate and rhythm; No murmurs, rubs, or gallops  ABDOMEN: Soft, Nontender, Nondistended; Bowel sounds present  EXTREMITIES:  2+ Peripheral Pulses, No clubbing, cyanosis, or edema  Back-mild-mod tenderness  LYMPH: No lymphadenopathy noted  SKIN: No rashes or lesions    Care Discussed with Consultants/Other Providers [ x] YES  [ ] NO      Code Status: [] Full Code [] DNR [] DNI [] Goals of Care:   Disposition: [] ICU [] Stroke Unit [] RCU []PCU []Floor [] Discharge Home         CECY [email or web address]

## 2019-08-11 LAB
ANION GAP SERPL CALC-SCNC: 13 MMO/L — SIGNIFICANT CHANGE UP (ref 7–14)
BACTERIA BLD CULT: SIGNIFICANT CHANGE UP
BUN SERPL-MCNC: 15 MG/DL — SIGNIFICANT CHANGE UP (ref 7–23)
CALCIUM SERPL-MCNC: 8.7 MG/DL — SIGNIFICANT CHANGE UP (ref 8.4–10.5)
CHLORIDE SERPL-SCNC: 107 MMOL/L — SIGNIFICANT CHANGE UP (ref 98–107)
CO2 SERPL-SCNC: 20 MMOL/L — LOW (ref 22–31)
CREAT SERPL-MCNC: 0.8 MG/DL — SIGNIFICANT CHANGE UP (ref 0.5–1.3)
GLUCOSE SERPL-MCNC: 121 MG/DL — HIGH (ref 70–99)
HCT VFR BLD CALC: 43 % — SIGNIFICANT CHANGE UP (ref 39–50)
HGB BLD-MCNC: 14 G/DL — SIGNIFICANT CHANGE UP (ref 13–17)
MAGNESIUM SERPL-MCNC: 2 MG/DL — SIGNIFICANT CHANGE UP (ref 1.6–2.6)
MCHC RBC-ENTMCNC: 29.5 PG — SIGNIFICANT CHANGE UP (ref 27–34)
MCHC RBC-ENTMCNC: 32.6 % — SIGNIFICANT CHANGE UP (ref 32–36)
MCV RBC AUTO: 90.7 FL — SIGNIFICANT CHANGE UP (ref 80–100)
NRBC # FLD: 0 K/UL — SIGNIFICANT CHANGE UP (ref 0–0)
PHOSPHATE SERPL-MCNC: 3.4 MG/DL — SIGNIFICANT CHANGE UP (ref 2.5–4.5)
PLATELET # BLD AUTO: 363 K/UL — SIGNIFICANT CHANGE UP (ref 150–400)
PMV BLD: 10.2 FL — SIGNIFICANT CHANGE UP (ref 7–13)
POTASSIUM SERPL-MCNC: 4.7 MMOL/L — SIGNIFICANT CHANGE UP (ref 3.5–5.3)
POTASSIUM SERPL-SCNC: 4.7 MMOL/L — SIGNIFICANT CHANGE UP (ref 3.5–5.3)
RBC # BLD: 4.74 M/UL — SIGNIFICANT CHANGE UP (ref 4.2–5.8)
RBC # FLD: 13.7 % — SIGNIFICANT CHANGE UP (ref 10.3–14.5)
SODIUM SERPL-SCNC: 140 MMOL/L — SIGNIFICANT CHANGE UP (ref 135–145)
SPECIMEN SOURCE: SIGNIFICANT CHANGE UP
SPECIMEN SOURCE: SIGNIFICANT CHANGE UP
WBC # BLD: 8.17 K/UL — SIGNIFICANT CHANGE UP (ref 3.8–10.5)
WBC # FLD AUTO: 8.17 K/UL — SIGNIFICANT CHANGE UP (ref 3.8–10.5)

## 2019-08-11 RX ORDER — KETOROLAC TROMETHAMINE 30 MG/ML
30 SYRINGE (ML) INJECTION ONCE
Refills: 0 | Status: DISCONTINUED | OUTPATIENT
Start: 2019-08-11 | End: 2019-08-11

## 2019-08-11 RX ORDER — IBUPROFEN 200 MG
800 TABLET ORAL EVERY 8 HOURS
Refills: 0 | Status: DISCONTINUED | OUTPATIENT
Start: 2019-08-11 | End: 2019-08-12

## 2019-08-11 RX ORDER — ACETAMINOPHEN 500 MG
1000 TABLET ORAL ONCE
Refills: 0 | Status: COMPLETED | OUTPATIENT
Start: 2019-08-11 | End: 2019-08-11

## 2019-08-11 RX ORDER — LANOLIN ALCOHOL/MO/W.PET/CERES
6 CREAM (GRAM) TOPICAL AT BEDTIME
Refills: 0 | Status: DISCONTINUED | OUTPATIENT
Start: 2019-08-11 | End: 2019-09-06

## 2019-08-11 RX ORDER — KETOROLAC TROMETHAMINE 30 MG/ML
15 SYRINGE (ML) INJECTION
Refills: 0 | Status: DISCONTINUED | OUTPATIENT
Start: 2019-08-11 | End: 2019-08-11

## 2019-08-11 RX ADMIN — LISINOPRIL 10 MILLIGRAM(S): 2.5 TABLET ORAL at 06:01

## 2019-08-11 RX ADMIN — LIDOCAINE 1 PATCH: 4 CREAM TOPICAL at 19:49

## 2019-08-11 RX ADMIN — Medication 15 MILLIGRAM(S): at 23:42

## 2019-08-11 RX ADMIN — Medication 400 MILLIGRAM(S): at 11:40

## 2019-08-11 RX ADMIN — GABAPENTIN 300 MILLIGRAM(S): 400 CAPSULE ORAL at 16:11

## 2019-08-11 RX ADMIN — Medication 1000 MILLIGRAM(S): at 12:40

## 2019-08-11 RX ADMIN — GABAPENTIN 300 MILLIGRAM(S): 400 CAPSULE ORAL at 06:03

## 2019-08-11 RX ADMIN — Medication 1000 MILLIGRAM(S): at 20:20

## 2019-08-11 RX ADMIN — Medication 30 MILLIGRAM(S): at 00:18

## 2019-08-11 RX ADMIN — PANTOPRAZOLE SODIUM 40 MILLIGRAM(S): 20 TABLET, DELAYED RELEASE ORAL at 06:01

## 2019-08-11 RX ADMIN — Medication 400 MILLIGRAM(S): at 19:49

## 2019-08-11 RX ADMIN — Medication 100 MILLIGRAM(S): at 09:26

## 2019-08-11 RX ADMIN — Medication 50 MILLIGRAM(S): at 16:12

## 2019-08-11 RX ADMIN — Medication 100 MILLIGRAM(S): at 16:11

## 2019-08-11 RX ADMIN — Medication 30 MILLIGRAM(S): at 06:02

## 2019-08-11 RX ADMIN — GABAPENTIN 300 MILLIGRAM(S): 400 CAPSULE ORAL at 22:11

## 2019-08-11 RX ADMIN — Medication 30 MILLIGRAM(S): at 06:20

## 2019-08-11 RX ADMIN — Medication 100 MILLIGRAM(S): at 00:18

## 2019-08-11 RX ADMIN — Medication 100 MILLIGRAM(S): at 23:43

## 2019-08-11 RX ADMIN — Medication 800 MILLIGRAM(S): at 16:42

## 2019-08-11 RX ADMIN — Medication 800 MILLIGRAM(S): at 14:14

## 2019-08-11 RX ADMIN — Medication 1 TABLET(S): at 22:11

## 2019-08-11 RX ADMIN — Medication 30 MILLIGRAM(S): at 00:35

## 2019-08-11 RX ADMIN — TIZANIDINE 4 MILLIGRAM(S): 4 TABLET ORAL at 09:31

## 2019-08-11 RX ADMIN — Medication 6 MILLIGRAM(S): at 22:11

## 2019-08-11 NOTE — PROVIDER CONTACT NOTE (CRITICAL VALUE NOTIFICATION) - ASSESSMENT
Patient alert and oriented x 4, able to make needs known, a febrile, VSS
pt is on cefazolin iv every 8 hrs, afebrile

## 2019-08-11 NOTE — PROGRESS NOTE ADULT - SUBJECTIVE AND OBJECTIVE BOX
LUZ BUCKLEY:[id number],   54yMale followed for:  No Known Allergies    PAST MEDICAL & SURGICAL HISTORY:  Sciatica  S/P ORIF (open reduction internal fixation) fracture    FAMILY HISTORY:    MEDICATIONS  (STANDING):  ceFAZolin   IVPB      ceFAZolin   IVPB 2000 milliGRAM(s) IV Intermittent every 8 hours  gabapentin 300 milliGRAM(s) Oral three times a day  lidocaine   Patch 1 Patch Transdermal daily  lisinopril 10 milliGRAM(s) Oral daily  pantoprazole    Tablet 40 milliGRAM(s) Oral before breakfast    MEDICATIONS  (PRN):  acetaminophen   Tablet .. 650 milliGRAM(s) Oral every 6 hours PRN Temp greater or equal to 38C (100.4F), Mild Pain (1 - 3)  cloNIDine 0.1 milliGRAM(s) Oral two times a day PRN withdrawl symptoms  hydrOXYzine hydrochloride 50 milliGRAM(s) Oral every 8 hours PRN Anxiety  tiZANidine 4 milliGRAM(s) Oral every 6 hours PRN back pain, muscle spasms      Vital Signs Last 24 Hrs  T(C): 36.7 (11 Aug 2019 05:58), Max: 37.2 (11 Aug 2019 01:06)  T(F): 98 (11 Aug 2019 05:58), Max: 98.9 (11 Aug 2019 01:06)  HR: 64 (11 Aug 2019 05:58) (64 - 78)  BP: 149/96 (11 Aug 2019 05:58) (121/88 - 149/96)  BP(mean): --  RR: 16 (11 Aug 2019 05:58) (16 - 18)  SpO2: 100% (11 Aug 2019 05:58) (99% - 100%)  nc/at  s1s2  cta  soft, nt, nd no guarding or rebound  no c/c/e    CBC Full  -  ( 11 Aug 2019 06:17 )  WBC Count : 8.17 K/uL  RBC Count : 4.74 M/uL  Hemoglobin : 14.0 g/dL  Hematocrit : 43.0 %  Platelet Count - Automated : 363 K/uL  Mean Cell Volume : 90.7 fL  Mean Cell Hemoglobin : 29.5 pg  Mean Cell Hemoglobin Concentration : 32.6 %  Auto Neutrophil # : x  Auto Lymphocyte # : x  Auto Monocyte # : x  Auto Eosinophil # : x  Auto Basophil # : x  Auto Neutrophil % : x  Auto Lymphocyte % : x  Auto Monocyte % : x  Auto Eosinophil % : x  Auto Basophil % : x    08-11    140  |  107  |  15  ----------------------------<  121<H>  4.7   |  20<L>  |  0.80    Ca    8.7      11 Aug 2019 06:17  Phos  3.4     08-11  Mg     2.0     08-11

## 2019-08-12 LAB
ANION GAP SERPL CALC-SCNC: 13 MMO/L — SIGNIFICANT CHANGE UP (ref 7–14)
APPEARANCE UR: CLEAR — SIGNIFICANT CHANGE UP
BACTERIA # UR AUTO: NEGATIVE — SIGNIFICANT CHANGE UP
BILIRUB UR-MCNC: NEGATIVE — SIGNIFICANT CHANGE UP
BLOOD UR QL VISUAL: NEGATIVE — SIGNIFICANT CHANGE UP
BUN SERPL-MCNC: 23 MG/DL — SIGNIFICANT CHANGE UP (ref 7–23)
CALCIUM SERPL-MCNC: 9.4 MG/DL — SIGNIFICANT CHANGE UP (ref 8.4–10.5)
CHLORIDE SERPL-SCNC: 104 MMOL/L — SIGNIFICANT CHANGE UP (ref 98–107)
CO2 SERPL-SCNC: 23 MMOL/L — SIGNIFICANT CHANGE UP (ref 22–31)
COLOR SPEC: YELLOW — SIGNIFICANT CHANGE UP
CREAT ?TM UR-MCNC: 215.7 MG/DL — SIGNIFICANT CHANGE UP
CREAT SERPL-MCNC: 1.79 MG/DL — HIGH (ref 0.5–1.3)
GLUCOSE SERPL-MCNC: 95 MG/DL — SIGNIFICANT CHANGE UP (ref 70–99)
GLUCOSE UR-MCNC: NEGATIVE — SIGNIFICANT CHANGE UP
HCT VFR BLD CALC: 47.4 % — SIGNIFICANT CHANGE UP (ref 39–50)
HGB BLD-MCNC: 14.9 G/DL — SIGNIFICANT CHANGE UP (ref 13–17)
HYALINE CASTS # UR AUTO: NEGATIVE — SIGNIFICANT CHANGE UP
KETONES UR-MCNC: NEGATIVE — SIGNIFICANT CHANGE UP
LEUKOCYTE ESTERASE UR-ACNC: NEGATIVE — SIGNIFICANT CHANGE UP
MAGNESIUM SERPL-MCNC: 2.4 MG/DL — SIGNIFICANT CHANGE UP (ref 1.6–2.6)
MCHC RBC-ENTMCNC: 29.7 PG — SIGNIFICANT CHANGE UP (ref 27–34)
MCHC RBC-ENTMCNC: 31.4 % — LOW (ref 32–36)
MCV RBC AUTO: 94.6 FL — SIGNIFICANT CHANGE UP (ref 80–100)
NITRITE UR-MCNC: NEGATIVE — SIGNIFICANT CHANGE UP
NRBC # FLD: 0 K/UL — SIGNIFICANT CHANGE UP (ref 0–0)
PH UR: 5.5 — SIGNIFICANT CHANGE UP (ref 5–8)
PHOSPHATE SERPL-MCNC: 6 MG/DL — HIGH (ref 2.5–4.5)
PLATELET # BLD AUTO: 407 K/UL — HIGH (ref 150–400)
PMV BLD: 10.2 FL — SIGNIFICANT CHANGE UP (ref 7–13)
POTASSIUM SERPL-MCNC: 5.2 MMOL/L — SIGNIFICANT CHANGE UP (ref 3.5–5.3)
POTASSIUM SERPL-SCNC: 5.2 MMOL/L — SIGNIFICANT CHANGE UP (ref 3.5–5.3)
PROT UR-MCNC: 30 — SIGNIFICANT CHANGE UP
PROT UR-MCNC: 39.4 MG/DL — SIGNIFICANT CHANGE UP
RBC # BLD: 5.01 M/UL — SIGNIFICANT CHANGE UP (ref 4.2–5.8)
RBC # FLD: 14.3 % — SIGNIFICANT CHANGE UP (ref 10.3–14.5)
RBC CASTS # UR COMP ASSIST: SIGNIFICANT CHANGE UP (ref 0–?)
SODIUM SERPL-SCNC: 140 MMOL/L — SIGNIFICANT CHANGE UP (ref 135–145)
SODIUM UR-SCNC: 31 MMOL/L — SIGNIFICANT CHANGE UP
SP GR SPEC: 1.03 — SIGNIFICANT CHANGE UP (ref 1–1.04)
SQUAMOUS # UR AUTO: SIGNIFICANT CHANGE UP
UROBILINOGEN FLD QL: NORMAL — SIGNIFICANT CHANGE UP
WBC # BLD: 8.71 K/UL — SIGNIFICANT CHANGE UP (ref 3.8–10.5)
WBC # FLD AUTO: 8.71 K/UL — SIGNIFICANT CHANGE UP (ref 3.8–10.5)
WBC UR QL: SIGNIFICANT CHANGE UP (ref 0–?)

## 2019-08-12 PROCEDURE — 93306 TTE W/DOPPLER COMPLETE: CPT | Mod: 26

## 2019-08-12 PROCEDURE — 99233 SBSQ HOSP IP/OBS HIGH 50: CPT

## 2019-08-12 RX ORDER — ACETAMINOPHEN 500 MG
1000 TABLET ORAL ONCE
Refills: 0 | Status: DISCONTINUED | OUTPATIENT
Start: 2019-08-12 | End: 2019-08-12

## 2019-08-12 RX ORDER — TRAMADOL HYDROCHLORIDE 50 MG/1
50 TABLET ORAL EVERY 6 HOURS
Refills: 0 | Status: DISCONTINUED | OUTPATIENT
Start: 2019-08-12 | End: 2019-08-12

## 2019-08-12 RX ORDER — SODIUM CHLORIDE 9 MG/ML
1000 INJECTION INTRAMUSCULAR; INTRAVENOUS; SUBCUTANEOUS
Refills: 0 | Status: DISCONTINUED | OUTPATIENT
Start: 2019-08-12 | End: 2019-08-18

## 2019-08-12 RX ORDER — OXYCODONE AND ACETAMINOPHEN 5; 325 MG/1; MG/1
1 TABLET ORAL EVERY 6 HOURS
Refills: 0 | Status: DISCONTINUED | OUTPATIENT
Start: 2019-08-12 | End: 2019-08-15

## 2019-08-12 RX ADMIN — OXYCODONE AND ACETAMINOPHEN 1 TABLET(S): 5; 325 TABLET ORAL at 15:27

## 2019-08-12 RX ADMIN — GABAPENTIN 300 MILLIGRAM(S): 400 CAPSULE ORAL at 21:13

## 2019-08-12 RX ADMIN — Medication 1 TABLET(S): at 14:44

## 2019-08-12 RX ADMIN — Medication 100 MILLIGRAM(S): at 09:27

## 2019-08-12 RX ADMIN — Medication 6 MILLIGRAM(S): at 21:13

## 2019-08-12 RX ADMIN — Medication 100 MILLIGRAM(S): at 19:01

## 2019-08-12 RX ADMIN — LIDOCAINE 1 PATCH: 4 CREAM TOPICAL at 21:13

## 2019-08-12 RX ADMIN — OXYCODONE AND ACETAMINOPHEN 1 TABLET(S): 5; 325 TABLET ORAL at 21:43

## 2019-08-12 RX ADMIN — Medication 800 MILLIGRAM(S): at 06:20

## 2019-08-12 RX ADMIN — TIZANIDINE 4 MILLIGRAM(S): 4 TABLET ORAL at 19:01

## 2019-08-12 RX ADMIN — OXYCODONE AND ACETAMINOPHEN 1 TABLET(S): 5; 325 TABLET ORAL at 21:13

## 2019-08-12 RX ADMIN — GABAPENTIN 300 MILLIGRAM(S): 400 CAPSULE ORAL at 05:45

## 2019-08-12 RX ADMIN — PANTOPRAZOLE SODIUM 40 MILLIGRAM(S): 20 TABLET, DELAYED RELEASE ORAL at 07:58

## 2019-08-12 RX ADMIN — OXYCODONE AND ACETAMINOPHEN 1 TABLET(S): 5; 325 TABLET ORAL at 14:29

## 2019-08-12 RX ADMIN — LIDOCAINE 1 PATCH: 4 CREAM TOPICAL at 07:55

## 2019-08-12 RX ADMIN — GABAPENTIN 300 MILLIGRAM(S): 400 CAPSULE ORAL at 14:29

## 2019-08-12 RX ADMIN — SODIUM CHLORIDE 75 MILLILITER(S): 9 INJECTION INTRAMUSCULAR; INTRAVENOUS; SUBCUTANEOUS at 16:32

## 2019-08-12 RX ADMIN — Medication 800 MILLIGRAM(S): at 05:46

## 2019-08-12 RX ADMIN — TIZANIDINE 4 MILLIGRAM(S): 4 TABLET ORAL at 05:46

## 2019-08-12 RX ADMIN — SODIUM CHLORIDE 75 MILLILITER(S): 9 INJECTION INTRAMUSCULAR; INTRAVENOUS; SUBCUTANEOUS at 21:13

## 2019-08-12 RX ADMIN — Medication 15 MILLIGRAM(S): at 00:15

## 2019-08-12 RX ADMIN — LIDOCAINE 1 PATCH: 4 CREAM TOPICAL at 07:00

## 2019-08-12 NOTE — PROVIDER CONTACT NOTE (CRITICAL VALUE NOTIFICATION) - ACTION/TREATMENT ORDERED:
XOCHITL Ding made aware, continue to monitor.
NP made aware. No new orders received. Will continue to monitor.
will monitor

## 2019-08-12 NOTE — PROGRESS NOTE ADULT - SUBJECTIVE AND OBJECTIVE BOX
MARCIOLUZ  54y  Male      Patient is a 54y old  Male who presents with a chief complaint of back pain and sciatica (12 Aug 2019 18:22)  Back pain is better.no sob,no cp,no fever.feels better    REVIEW OF SYSTEMS:  as above        INTERVAL HPI/OVERNIGHT EVENTS:  T(C): 36.5 (08-12-19 @ 14:15), Max: 36.7 (08-12-19 @ 05:44)  HR: 74 (08-12-19 @ 14:15) (69 - 74)  BP: 119/80 (08-12-19 @ 14:15) (119/80 - 125/96)  RR: 16 (08-12-19 @ 14:15) (16 - 18)  SpO2: 100% (08-12-19 @ 14:15) (100% - 100%)  Wt(kg): --  I&O's Summary    11 Aug 2019 07:01  -  12 Aug 2019 07:00  --------------------------------------------------------  IN: 0 mL / OUT: 400 mL / NET: -400 mL    12 Aug 2019 07:01  -  12 Aug 2019 20:20  --------------------------------------------------------  IN: 50 mL / OUT: 0 mL / NET: 50 mL      T(C): 36.5 (08-12-19 @ 14:15), Max: 36.7 (08-12-19 @ 05:44)  HR: 74 (08-12-19 @ 14:15) (69 - 74)  BP: 119/80 (08-12-19 @ 14:15) (119/80 - 125/96)  RR: 16 (08-12-19 @ 14:15) (16 - 18)  SpO2: 100% (08-12-19 @ 14:15) (100% - 100%)  Wt(kg): --Vital Signs Last 24 Hrs  T(C): 36.5 (12 Aug 2019 14:15), Max: 36.7 (12 Aug 2019 05:44)  T(F): 97.7 (12 Aug 2019 14:15), Max: 98 (12 Aug 2019 05:44)  HR: 74 (12 Aug 2019 14:15) (69 - 74)  BP: 119/80 (12 Aug 2019 14:15) (119/80 - 125/96)  BP(mean): --  RR: 16 (12 Aug 2019 14:15) (16 - 18)  SpO2: 100% (12 Aug 2019 14:15) (100% - 100%)    LABS:                        14.9   8.71  )-----------( 407      ( 12 Aug 2019 06:04 )             47.4     08-12    140  |  104  |  23  ----------------------------<  95  5.2   |  23  |  1.79<H>    Ca    9.4      12 Aug 2019 06:04  Phos  6.0     08-12  Mg     2.4     08-12          CAPILLARY BLOOD GLUCOSE                PAST MEDICAL & SURGICAL HISTORY:  Sciatica  S/P ORIF (open reduction internal fixation) fracture      MEDICATIONS  (STANDING):  ceFAZolin   IVPB      ceFAZolin   IVPB 2000 milliGRAM(s) IV Intermittent every 8 hours  gabapentin 300 milliGRAM(s) Oral three times a day  lidocaine   Patch 1 Patch Transdermal daily  melatonin 6 milliGRAM(s) Oral at bedtime  multivitamin      multivitamin 1 Tablet(s) Oral daily  pantoprazole    Tablet 40 milliGRAM(s) Oral before breakfast  sodium chloride 0.9%. 1000 milliLiter(s) (75 mL/Hr) IV Continuous <Continuous>    MEDICATIONS  (PRN):  cloNIDine 0.1 milliGRAM(s) Oral two times a day PRN withdrawl symptoms  hydrOXYzine hydrochloride 50 milliGRAM(s) Oral every 8 hours PRN Anxiety  oxyCODONE    5 mG/acetaminophen 325 mG 1 Tablet(s) Oral every 6 hours PRN Severe Pain (7 - 10)  tiZANidine 4 milliGRAM(s) Oral every 6 hours PRN back pain, muscle spasms        RADIOLOGY & ADDITIONAL TESTS:    Imaging Personally Reviewed:  [ ] YES  [ ] NO    Consultant(s) Notes Reviewed:  [ x] YES  [ ] NO    PHYSICAL EXAM:  GENERAL: NAD, well-groomed, well-developed  HEAD:  Atraumatic, Normocephalic  EYES: EOMI, PERRLA, conjunctiva and sclera clear  ENMT: No tonsillar erythema, exudates, or enlargement; Moist mucous membranes, Good dentition, No lesions  NECK: Supple, No JVD, Normal thyroid  NERVOUS SYSTEM:  Alert & Oriented X3, nonfocal  CHEST/LUNG: Clear to percussion bilaterally; No rales, rhonchi, wheezing, or rubs  HEART: Regular rate and rhythm; No murmurs, rubs, or gallops  ABDOMEN: Soft, Nontender, Nondistended; Bowel sounds present  EXTREMITIES:  2+ Peripheral Pulses, No clubbing, cyanosis, or edema  LYMPH: No lymphadenopathy noted  SKIN: No rashes or lesions    Care Discussed with Consultants/Other Providers [ ] YES  [ ] NO      Code Status: [] Full Code [] DNR [] DNI [] Goals of Care:   Disposition: [] ICU [] Stroke Unit [] RCU []PCU []Floor [] Discharge Home         ANTONETTE RodriguezFACP

## 2019-08-12 NOTE — PROGRESS NOTE ADULT - ASSESSMENT
54 m with IVDA, hepatitis C (not treated), RLE ORIF and hardware, previous bacteremia and lumbar osteo? 2004, presented with worsening back pain, with difficulty ambulating due to pain.  here afebrile normal WBC  blood cx: 2/2 MSSA  hep C viral load: [id number]  spine MRI: enhancing lesion on L3 s/o occult fx or a lesion but CT negative for FX or lesion  hiv negative    MSSA bacteremia and ?L3 osteo (read as occult fx or a lesion on MRI but CT negative for FX or lesion)   active heroine use  repeat 8/8 and 8/10 also positive  ARNOLDO    * repeat 2 sets of blood cultures q 48 until clear  * echo  * c/w cefazolin 2 q 8  * monitor the renal function if continues to worsen will have to adjust the doses

## 2019-08-12 NOTE — PROGRESS NOTE ADULT - SUBJECTIVE AND OBJECTIVE BOX
LUZ BUCKLEY:[id number],   54yMale followed for:  No Known Allergies    PAST MEDICAL & SURGICAL HISTORY:  Sciatica  S/P ORIF (open reduction internal fixation) fracture    FAMILY HISTORY:    MEDICATIONS  (STANDING):  ceFAZolin   IVPB      ceFAZolin   IVPB 2000 milliGRAM(s) IV Intermittent every 8 hours  gabapentin 300 milliGRAM(s) Oral three times a day  lidocaine   Patch 1 Patch Transdermal daily  lisinopril 10 milliGRAM(s) Oral daily  melatonin 6 milliGRAM(s) Oral at bedtime  multivitamin      multivitamin 1 Tablet(s) Oral daily  pantoprazole    Tablet 40 milliGRAM(s) Oral before breakfast    MEDICATIONS  (PRN):  acetaminophen   Tablet .. 650 milliGRAM(s) Oral every 6 hours PRN Temp greater or equal to 38C (100.4F), Mild Pain (1 - 3)  cloNIDine 0.1 milliGRAM(s) Oral two times a day PRN withdrawl symptoms  hydrOXYzine hydrochloride 50 milliGRAM(s) Oral every 8 hours PRN Anxiety  ibuprofen  Tablet. 800 milliGRAM(s) Oral every 8 hours PRN Moderate Pain (4 - 6), Severe Pain (7 - 10)  tiZANidine 4 milliGRAM(s) Oral every 6 hours PRN back pain, muscle spasms      Vital Signs Last 24 Hrs  T(C): 36.7 (12 Aug 2019 05:44), Max: 36.8 (11 Aug 2019 14:00)  T(F): 98 (12 Aug 2019 05:44), Max: 98.2 (11 Aug 2019 14:00)  HR: 70 (12 Aug 2019 05:44) (66 - 70)  BP: 125/96 (12 Aug 2019 05:44) (121/91 - 129/94)  BP(mean): --  RR: 16 (12 Aug 2019 05:44) (16 - 18)  SpO2: 100% (12 Aug 2019 05:44) (98% - 100%)  nc/at  s1s2  cta  soft, nt, nd no guarding or rebound  no c/c/e    CBC Full  -  ( 12 Aug 2019 06:04 )  WBC Count : 8.71 K/uL  RBC Count : 5.01 M/uL  Hemoglobin : 14.9 g/dL  Hematocrit : 47.4 %  Platelet Count - Automated : 407 K/uL  Mean Cell Volume : 94.6 fL  Mean Cell Hemoglobin : 29.7 pg  Mean Cell Hemoglobin Concentration : 31.4 %  Auto Neutrophil # : x  Auto Lymphocyte # : x  Auto Monocyte # : x  Auto Eosinophil # : x  Auto Basophil # : x  Auto Neutrophil % : x  Auto Lymphocyte % : x  Auto Monocyte % : x  Auto Eosinophil % : x  Auto Basophil % : x    08-12    140  |  104  |  23  ----------------------------<  95  5.2   |  23  |  1.79<H>    Ca    9.4      12 Aug 2019 06:04  Phos  6.0     08-12  Mg     2.4     08-12

## 2019-08-12 NOTE — PROGRESS NOTE ADULT - SUBJECTIVE AND OBJECTIVE BOX
Follow Up:  MSSA bacteremia    Interval History: pt stable and afebrile, the repeat cx 8/10 still positive pt c/o back pain    ROS:      All other systems negative    Constitutional: no fever, no chills, no weight loss, no night sweats  Eye: no eye pain, no redness, no vision changes  ENT:  no sore throat, no rhinorrhea  Cardiovascular:  no chest pain, no palpitation  Respiratory:  no SOB, no cough  GI:  no abd pain, no vomiting, no diarrhea  urinary: no dysuria, no hematuria, no flank pain  : no penile discharge or bleeding  musculoskeletal:  severe back pain  skin:  no rash  neurology:  no headache, no seizure, no change in mental status          Allergies  No Known Allergies        ANTIMICROBIALS:  ceFAZolin   IVPB    ceFAZolin   IVPB 2000 every 8 hours      OTHER MEDS:  cloNIDine 0.1 milliGRAM(s) Oral two times a day PRN  gabapentin 300 milliGRAM(s) Oral three times a day  hydrOXYzine hydrochloride 50 milliGRAM(s) Oral every 8 hours PRN  lidocaine   Patch 1 Patch Transdermal daily  melatonin 6 milliGRAM(s) Oral at bedtime  multivitamin      multivitamin 1 Tablet(s) Oral daily  oxyCODONE    5 mG/acetaminophen 325 mG 1 Tablet(s) Oral every 6 hours PRN  pantoprazole    Tablet 40 milliGRAM(s) Oral before breakfast  sodium chloride 0.9%. 1000 milliLiter(s) IV Continuous <Continuous>  tiZANidine 4 milliGRAM(s) Oral every 6 hours PRN      Vital Signs Last 24 Hrs  T(C): 36.5 (12 Aug 2019 14:15), Max: 36.7 (12 Aug 2019 05:44)  T(F): 97.7 (12 Aug 2019 14:15), Max: 98 (12 Aug 2019 05:44)  HR: 74 (12 Aug 2019 14:15) (69 - 74)  BP: 119/80 (12 Aug 2019 14:15) (119/80 - 125/96)  BP(mean): --  RR: 16 (12 Aug 2019 14:15) (16 - 18)  SpO2: 100% (12 Aug 2019 14:15) (100% - 100%)    Physical Exam:  General:    NAD, non toxic  Head: atraumatic, normocephalic  Eyes: normal sclera and conjunctiva  ENT:   no oropharyngeal lesions, no LAD, neck supple  Cardio:    regular S1,S2  Respiratory:   clear b/l, no wheezing  abd:   soft, BS +, not tender, no hepatosplenomegaly  :     no CVAT, no suprapubic tenderness, no romero  Musculoskeletal : RLE scar from ORIF but no tenderness  Skin:    no rash  vascular: no phlebitis, normal pulses  Neurologic:     no focal deficits  psych: normal affect                          14.9   8.71  )-----------( 407      ( 12 Aug 2019 06:04 )             47.4       08-12    140  |  104  |  23  ----------------------------<  95  5.2   |  23  |  1.79<H>    Ca    9.4      12 Aug 2019 06:04  Phos  6.0     08-12  Mg     2.4     08-12            MICROBIOLOGY:  v  BLOOD VENOUS  08-10-19 --  --  --      BLOOD  08-08-19 --  --  --      BLOOD  08-07-19 --  --  Staphylococcus aureus  BLOOD CULTURE PCR                RADIOLOGY:  Images below reviewed personally  < from: MR Lumbar Spine w/wo IV Cont (08.05.19 @ 22:12) >  IMPRESSION:   Nonspecific abnormal signal of the L3 vertebral body that is hypointense   on T1-weighted imaging and slightly enhancing. This may represent occult   fracture though underlying lesion cannot entirely excluded. Continued   close interval follow-up until resolution is recommended. The possibility   of underlying infection cannot be entirely excluded as well though is   less likely given the pattern of abnormal signal and enhancement.

## 2019-08-12 NOTE — PROGRESS NOTE ADULT - SUBJECTIVE AND OBJECTIVE BOX
Chief Complaint:    HPI:  Pt is a 55 yo male with chronic back pain, kidney cyst a/w acute on chronic back pain with sciatica with difficulty ambulating due to pain.  Pt reports a similar episode approx 15 years ago.  Upon further questioning about that epiosde, pt states he was told it was due to the deposition of the impurities of the heroin he used.  States they put a needle in his back, unclear if he had abx during that time.  Pt states he is still using heroin every 3-4 days, about 4 bags at a time.  Reports he is also feeling a little "sick" and has not eaten in 4 days due to using and his pain; currently is hungry.  Pt states he had a neg HIV test about 3 months ago and states he uses clean needles every time he injects.    Pt denies fever, chills, incontinence bowel or bladder (04 Aug 2019 09:08)      PAST MEDICAL & SURGICAL HISTORY:  Sciatica  S/P ORIF (open reduction internal fixation) fracture      FAMILY HISTORY:      SOCIAL HISTORY:  [ ] Denies Smoking, Alcohol, or Drug Use    Allergies    No Known Allergies    Intolerances        PAIN MEDICATIONS:  gabapentin 300 milliGRAM(s) Oral three times a day  hydrOXYzine hydrochloride 50 milliGRAM(s) Oral every 8 hours PRN  melatonin 6 milliGRAM(s) Oral at bedtime  oxyCODONE    5 mG/acetaminophen 325 mG 1 Tablet(s) Oral every 6 hours PRN  tiZANidine 4 milliGRAM(s) Oral every 6 hours PRN      Heme:    Antibiotics:  ceFAZolin   IVPB      ceFAZolin   IVPB 2000 milliGRAM(s) IV Intermittent every 8 hours    Cardiovascular:  cloNIDine 0.1 milliGRAM(s) Oral two times a day PRN    GI:  pantoprazole    Tablet 40 milliGRAM(s) Oral before breakfast    Endocrine:    All Other Medications:  lidocaine   Patch 1 Patch Transdermal daily  multivitamin      multivitamin 1 Tablet(s) Oral daily  sodium chloride 0.9%. 1000 milliLiter(s) IV Continuous <Continuous>      REVIEW OF SYSTEMS:    CONSTITUTIONAL: No fever, weight loss, or fatigue  EYES: No eye pain, visual disturbances, or discharge  ENMT:  No difficulty hearing, tinnitus, vertigo; No sinus or throat pain  NECK: No pain or stiffness  BREASTS: No pain, masses, or nipple discharge  RESPIRATORY: No cough, wheezing, chills or hemoptysis; No shortness of breath  CARDIOVASCULAR: No chest pain, palpitations, dizziness, or leg swelling  GASTROINTESTINAL: No abdominal or epigastric pain. No nausea, vomiting, or hematemesis; No diarrhea or constipation. No melena or hematochezia.  GENITOURINARY: No dysuria, frequency, hematuria, or incontinence  NEUROLOGICAL: No headaches, memory loss, loss of strength, numbness, or tremors  SKIN: No itching, burning, rashes, or lesions   LYMPH NODES: No enlarged glands  ENDOCRINE: No heat or cold intolerance; No hair loss  MUSCULOSKELETAL: No joint pain or swelling; No muscle, back, or extremity pain  PSYCHIATRIC: No depression, anxiety, mood swings, or difficulty sleeping  HEME/LYMPH: No easy bruising, or bleeding gums  ALLERY AND IMMUNOLOGIC: No hives or eczema      Vital Signs Last 24 Hrs  T(C): 36.5 (12 Aug 2019 14:15), Max: 36.7 (12 Aug 2019 05:44)  T(F): 97.7 (12 Aug 2019 14:15), Max: 98 (12 Aug 2019 05:44)  HR: 74 (12 Aug 2019 14:15) (69 - 74)  BP: 119/80 (12 Aug 2019 14:15) (119/80 - 125/96)  BP(mean): --  RR: 16 (12 Aug 2019 14:15) (16 - 18)  SpO2: 100% (12 Aug 2019 14:15) (100% - 100%)    PAIN SCORE:         SCALE USED: (1-10 VNRS)             PHYSICAL EXAM:    GENERAL: NAD, well-groomed, well-developed  HEAD:  Atraumatic, Normocephalic  EYES: EOMI, PERRLA, conjunctiva and sclera clear  ENMT: No tonsillar erythema, exudates, or enlargement; Moist mucous membranes, Good dentition, No lesions  NECK: Supple, No JVD, Normal thyroid  NERVOUS SYSTEM:  Alert & Oriented X3, Good concentration; Motor Strength 5/5 B/L upper and lower extremities; DTRs 2+ intact and symmetric  CHEST/LUNG: Clear to percussion bilaterally; No rales, rhonchi, wheezing, or rubs  HEART: Regular rate and rhythm; No murmurs, rubs, or gallops  ABDOMEN: Soft, Nontender, Nondistended; Bowel sounds present  EXTREMITIES:  2+ Peripheral Pulses, No clubbing, cyanosis, or edema  LYMPH: No lymphadenopathy noted  SKIN: No rashes or lesions        LABS:                          14.9   8.71  )-----------( 407      ( 12 Aug 2019 06:04 )             47.4     08-12    140  |  104  |  23  ----------------------------<  95  5.2   |  23  |  1.79<H>    Ca    9.4      12 Aug 2019 06:04  Phos  6.0     08-12  Mg     2.4     08-12          Comments: Pt. complaining of pain not being controlled after stoping IV toradol due to kidney functions. Pain is 10/10 in lower back denies any numbness in his lower extremities. Discussed with team and patient that we will start him on percocet 5mg Q6hrs PRN while in hospital only, which he is agreeable too but still insist he needs more for pain. Will discuss with chronic pain attending.          RECOMMENDATIONS  1. Add lidoderm patch to lower back   2. Add Percocet 5mg q6hrs PRN for pain  3. May increase neurontin to 600mg TID  4. Add nortriptyline 25mg daily at bedtime  5. Consider psych consult for addiction       [ X]  [place]  Reviewed and Copied to Chart

## 2019-08-12 NOTE — CONSULT NOTE ADULT - ASSESSMENT
55 yo male active heroin use with chronic back pain, kidney cyst a/w acute on chronic back pain with sciatica with difficulty ambulating due to pain 8/4. patient found to have s. aureus bacteremia on cefazolin iv. nephrology consulted for ARNOLDO.     ARNOLDO  scr worsen from 0.8 yesterday to 1.79 today  no noted hemodynamic changes  persistent s. aureus bacteremia blood cx 8/10 + pending echo. on cefazolin iv, vanco x 1 dose 8/7  eating and drinking fine per patient, euvolemic on exam, will give trail for gentle hydration   c/o some retention abd exam benign. check renal US r/o hydro   received multiple dose of iv ketorolac, on ibuprofen and lisinopril   dc lisinopril and ibuprofen.   avoid NSAIDS, nephrotoxic agents  check urine cr, na, UA, renal US  monitor bmp, urine output   IVF x 1L    HTN  controlled  hold ARB in view of ARNOLDO  monitor 53 yo male active heroin use with chronic back pain, kidney cyst a/w acute on chronic back pain with sciatica with difficulty ambulating due to pain 8/4. patient found to have s. aureus bacteremia on cefazolin iv. nephrology consulted for ARNOLDO.     ARNOLDO  scr worsen from 0.8 yesterday to 1.79 today  no noted hemodynamic changes  persistent s. aureus bacteremia blood cx 8/10 + pending echo. on cefazolin iv, vanco x 1 dose 8/7  eating and drinking fine per patient, euvolemic on exam, will give trail for gentle hydration   c/o some retention abd exam benign. check renal US r/o hydro   received multiple dose of iv ketorolac, on ibuprofen and lisinopril   dc lisinopril and ibuprofen.   avoid NSAIDS, nephrotoxic agents  check urine cr, na, UA, renal US  monitor bmp, urine output   IVF x 1L    HTN  controlled  hold ARB in view of ARNOLDO  monitor     hep C  f/u GI  outpatient treatment    proteinuria  mild  check urine p/c ratio  hep c +

## 2019-08-12 NOTE — CONSULT NOTE ADULT - SUBJECTIVE AND OBJECTIVE BOX
[facility]   MD JEYSON GALAN DO ANGELA WONG, PA    TEL:  OFFICE: [phone]  DR FORREST CELL: [phone]  DR. OCHOA CELL: [phone]  MARAL CHUN CELL: [phone]      HPI:  53 yo male active heroin use with chronic back pain, kidney cyst a/w acute on chronic back pain with sciatica with difficulty ambulating due to pain . patient found to have s. aureus bacteremia on cefazolin iv. nephrology consulted for ARNOLDO.   Patient state eating and drinking well since hospital. on NSAIDS for back pain, lisinopril 10 for bp, c/o difficulties urinating.      Allergies:  No Known Allergies      PAST MEDICAL & SURGICAL HISTORY:  Sciatica  S/P ORIF (open reduction internal fixation) fracture      Home Medications Reviewed    Hospital Medications:   MEDICATIONS  (STANDING):  ceFAZolin   IVPB      ceFAZolin   IVPB 2000 milliGRAM(s) IV Intermittent every 8 hours  gabapentin 300 milliGRAM(s) Oral three times a day  lidocaine   Patch 1 Patch Transdermal daily  melatonin 6 milliGRAM(s) Oral at bedtime  multivitamin      multivitamin 1 Tablet(s) Oral daily  pantoprazole    Tablet 40 milliGRAM(s) Oral before breakfast  sodium chloride 0.9%. 1000 milliLiter(s) (75 mL/Hr) IV Continuous <Continuous>      SOCIAL HISTORY:  see hpi    FAMILY HISTORY:      REVIEW OF SYSTEMS:  CONSTITUTIONAL: No weakness, fevers or chills  EYES/ENT: No visual changes;  No vertigo or throat pain   NECK: No pain or stiffness  RESPIRATORY: No cough, wheezing, hemoptysis; No shortness of breath  CARDIOVASCULAR: No chest pain or palpitations.  GASTROINTESTINAL: No abdominal or epigastric pain. No nausea, vomiting, or hematemesis; No diarrhea or constipation. No melena or hematochezia.  GENITOURINARY: No dysuria, frequency, foamy urine, urinary urgency, incontinence or hematuria  NEUROLOGICAL: back pain  SKIN: No itching, burning, rashes, or lesions   VASCULAR: No bilateral lower extremity edema.   All other review of systems is negative unless indicated above.    VITALS:  T(F): 98 (19 @ 05:44), Max: 98.2 (19 @ 14:00)  HR: 70 (19 @ 05:44)  BP: 125/96 (19 @ 05:44)  RR: 16 (19 @ 05:44)  SpO2: 100% (19 @ 05:44)  Wt(kg): --     @ 07:01  -   @ 07:00  --------------------------------------------------------  IN: 0 mL / OUT: 400 mL / NET: -400 mL     @ 07:01  -   @ 12:41  --------------------------------------------------------  IN: 50 mL / OUT: 0 mL / NET: 50 mL          PHYSICAL EXAM:  Constitutional: NAD  HEENT: anicteric sclera, oropharynx clear, MMM  Neck: No JVD  Respiratory: CTAB, no wheezes, rales or rhonchi  Cardiovascular: S1, S2, RRR  Gastrointestinal: BS+, soft, NT/ND  Extremities: No cyanosis or clubbing. No peripheral edema  Neurological: A/O x 3, no focal deficits  Psychiatric: Normal mood, normal affect  : No CVA tenderness. No romero.   Skin: No rashes      LABS:      140  |  104  |  23  ----------------------------<  95  5.2   |  23  |  1.79<H>    Ca    9.4      12 Aug 2019 06:04  Phos  6.0       Mg     2.4           Creatinine Trend: 1.79 <--, 0.80 <--, 0.83 <--, 0.81 <--, 0.84 <--, 0.81 <--, 0.82 <--                        14.9   8.71  )-----------( 407      ( 12 Aug 2019 06:04 )             47.4     Urine Studies:  Urinalysis Basic - ( 06 Aug 2019 17:30 )    Color: DARK YELLOW / Appearance: CLEAR / S.030 / pH: 6.5  Gluc: NEGATIVE / Ketone: NEGATIVE  / Bili: TRACE / Urobili: LARGE   Blood: NEGATIVE / Protein: 30 / Nitrite: NEGATIVE   Leuk Esterase: NEGATIVE / RBC: 3-5 / WBC 0-2   Sq Epi: OCC / Non Sq Epi:  / Bacteria: NEGATIVE          RADIOLOGY & ADDITIONAL STUDIES:

## 2019-08-13 LAB
ANION GAP SERPL CALC-SCNC: 11 MMO/L — SIGNIFICANT CHANGE UP (ref 7–14)
BUN SERPL-MCNC: 26 MG/DL — HIGH (ref 7–23)
CALCIUM SERPL-MCNC: 8.9 MG/DL — SIGNIFICANT CHANGE UP (ref 8.4–10.5)
CHLORIDE SERPL-SCNC: 105 MMOL/L — SIGNIFICANT CHANGE UP (ref 98–107)
CO2 SERPL-SCNC: 20 MMOL/L — LOW (ref 22–31)
CREAT SERPL-MCNC: 1.08 MG/DL — SIGNIFICANT CHANGE UP (ref 0.5–1.3)
GLUCOSE SERPL-MCNC: 92 MG/DL — SIGNIFICANT CHANGE UP (ref 70–99)
MAGNESIUM SERPL-MCNC: 2.2 MG/DL — SIGNIFICANT CHANGE UP (ref 1.6–2.6)
PHOSPHATE SERPL-MCNC: 3.2 MG/DL — SIGNIFICANT CHANGE UP (ref 2.5–4.5)
POTASSIUM SERPL-MCNC: 5.2 MMOL/L — SIGNIFICANT CHANGE UP (ref 3.5–5.3)
POTASSIUM SERPL-SCNC: 5.2 MMOL/L — SIGNIFICANT CHANGE UP (ref 3.5–5.3)
SODIUM SERPL-SCNC: 136 MMOL/L — SIGNIFICANT CHANGE UP (ref 135–145)
SPECIMEN SOURCE: SIGNIFICANT CHANGE UP

## 2019-08-13 PROCEDURE — 99233 SBSQ HOSP IP/OBS HIGH 50: CPT

## 2019-08-13 RX ORDER — NORTRIPTYLINE HYDROCHLORIDE 10 MG/1
25 CAPSULE ORAL AT BEDTIME
Refills: 0 | Status: DISCONTINUED | OUTPATIENT
Start: 2019-08-13 | End: 2019-08-13

## 2019-08-13 RX ORDER — KETOROLAC TROMETHAMINE 30 MG/ML
15 SYRINGE (ML) INJECTION ONCE
Refills: 0 | Status: DISCONTINUED | OUTPATIENT
Start: 2019-08-13 | End: 2019-08-13

## 2019-08-13 RX ORDER — NAFCILLIN 10 G/100ML
2 INJECTION, POWDER, FOR SOLUTION INTRAVENOUS ONCE
Refills: 0 | Status: COMPLETED | OUTPATIENT
Start: 2019-08-13 | End: 2019-08-13

## 2019-08-13 RX ORDER — NORTRIPTYLINE HYDROCHLORIDE 10 MG/1
25 CAPSULE ORAL ONCE
Refills: 0 | Status: COMPLETED | OUTPATIENT
Start: 2019-08-13 | End: 2019-08-13

## 2019-08-13 RX ORDER — GABAPENTIN 400 MG/1
600 CAPSULE ORAL THREE TIMES A DAY
Refills: 0 | Status: DISCONTINUED | OUTPATIENT
Start: 2019-08-13 | End: 2019-08-19

## 2019-08-13 RX ORDER — NAFCILLIN 10 G/100ML
2 INJECTION, POWDER, FOR SOLUTION INTRAVENOUS EVERY 4 HOURS
Refills: 0 | Status: DISCONTINUED | OUTPATIENT
Start: 2019-08-13 | End: 2019-09-06

## 2019-08-13 RX ORDER — NORTRIPTYLINE HYDROCHLORIDE 10 MG/1
25 CAPSULE ORAL AT BEDTIME
Refills: 0 | Status: DISCONTINUED | OUTPATIENT
Start: 2019-08-14 | End: 2019-09-06

## 2019-08-13 RX ORDER — NAFCILLIN 10 G/100ML
INJECTION, POWDER, FOR SOLUTION INTRAVENOUS
Refills: 0 | Status: DISCONTINUED | OUTPATIENT
Start: 2019-08-13 | End: 2019-09-06

## 2019-08-13 RX ADMIN — Medication 1 TABLET(S): at 11:33

## 2019-08-13 RX ADMIN — TIZANIDINE 4 MILLIGRAM(S): 4 TABLET ORAL at 10:23

## 2019-08-13 RX ADMIN — NAFCILLIN 200 GRAM(S): 10 INJECTION, POWDER, FOR SOLUTION INTRAVENOUS at 16:36

## 2019-08-13 RX ADMIN — Medication 6 MILLIGRAM(S): at 23:56

## 2019-08-13 RX ADMIN — NAFCILLIN 200 GRAM(S): 10 INJECTION, POWDER, FOR SOLUTION INTRAVENOUS at 11:33

## 2019-08-13 RX ADMIN — OXYCODONE AND ACETAMINOPHEN 1 TABLET(S): 5; 325 TABLET ORAL at 18:15

## 2019-08-13 RX ADMIN — OXYCODONE AND ACETAMINOPHEN 1 TABLET(S): 5; 325 TABLET ORAL at 11:10

## 2019-08-13 RX ADMIN — OXYCODONE AND ACETAMINOPHEN 1 TABLET(S): 5; 325 TABLET ORAL at 10:10

## 2019-08-13 RX ADMIN — OXYCODONE AND ACETAMINOPHEN 1 TABLET(S): 5; 325 TABLET ORAL at 03:48

## 2019-08-13 RX ADMIN — LIDOCAINE 1 PATCH: 4 CREAM TOPICAL at 05:16

## 2019-08-13 RX ADMIN — Medication 50 MILLIGRAM(S): at 01:55

## 2019-08-13 RX ADMIN — LIDOCAINE 1 PATCH: 4 CREAM TOPICAL at 10:25

## 2019-08-13 RX ADMIN — OXYCODONE AND ACETAMINOPHEN 1 TABLET(S): 5; 325 TABLET ORAL at 17:48

## 2019-08-13 RX ADMIN — GABAPENTIN 600 MILLIGRAM(S): 400 CAPSULE ORAL at 23:55

## 2019-08-13 RX ADMIN — Medication 15 MILLIGRAM(S): at 23:58

## 2019-08-13 RX ADMIN — Medication 100 MILLIGRAM(S): at 01:54

## 2019-08-13 RX ADMIN — NORTRIPTYLINE HYDROCHLORIDE 25 MILLIGRAM(S): 10 CAPSULE ORAL at 18:53

## 2019-08-13 RX ADMIN — OXYCODONE AND ACETAMINOPHEN 1 TABLET(S): 5; 325 TABLET ORAL at 03:18

## 2019-08-13 NOTE — DIETITIAN INITIAL EVALUATION ADULT. - PERTINENT LABORATORY DATA
08-13 Na 136 mmol/L Glu 92 mg/dL K+ 5.2 mmol/L Cr 1.08 mg/dL BUN 26 mg/dL<H> Phos 3.2 mg/dL Alb n/a   PAB n/a   Hgb n/a   Hct n/a   HgA1C n/a    Glucose, Serum: 92 mg/dL

## 2019-08-13 NOTE — DIETITIAN INITIAL EVALUATION ADULT. - RD TO REMAIN AVAILABLE
1. Continue Regular diet. 2. Continue Ensure Enlive 240mls 3x daily (1050kcal, 60g protein). 3. Please Encourage po intake, assist with meals and menu selections, provide alternatives PRN.

## 2019-08-13 NOTE — PROGRESS NOTE ADULT - SUBJECTIVE AND OBJECTIVE BOX
LUZ BUCKLEY:[id number],   54yMale followed for:  No Known Allergies    PAST MEDICAL & SURGICAL HISTORY:  Sciatica  S/P ORIF (open reduction internal fixation) fracture    FAMILY HISTORY:    MEDICATIONS  (STANDING):  ceFAZolin   IVPB      ceFAZolin   IVPB 2000 milliGRAM(s) IV Intermittent every 8 hours  gabapentin 300 milliGRAM(s) Oral three times a day  lidocaine   Patch 1 Patch Transdermal daily  melatonin 6 milliGRAM(s) Oral at bedtime  multivitamin      multivitamin 1 Tablet(s) Oral daily  pantoprazole    Tablet 40 milliGRAM(s) Oral before breakfast  sodium chloride 0.9%. 1000 milliLiter(s) (75 mL/Hr) IV Continuous <Continuous>    MEDICATIONS  (PRN):  cloNIDine 0.1 milliGRAM(s) Oral two times a day PRN withdrawl symptoms  hydrOXYzine hydrochloride 50 milliGRAM(s) Oral every 8 hours PRN Anxiety  oxyCODONE    5 mG/acetaminophen 325 mG 1 Tablet(s) Oral every 6 hours PRN Severe Pain (7 - 10)  tiZANidine 4 milliGRAM(s) Oral every 6 hours PRN back pain, muscle spasms      Vital Signs Last 24 Hrs  T(C): 36.6 (13 Aug 2019 05:15), Max: 36.6 (13 Aug 2019 05:15)  T(F): 97.8 (13 Aug 2019 05:15), Max: 97.8 (13 Aug 2019 05:15)  HR: 70 (13 Aug 2019 05:15) (70 - 74)  BP: 117/90 (13 Aug 2019 05:15) (111/79 - 119/80)  BP(mean): --  RR: 18 (13 Aug 2019 05:15) (16 - 18)  SpO2: 99% (13 Aug 2019 05:15) (99% - 100%)  nc/at  s1s2  cta  soft, nt, nd no guarding or rebound  no c/c/e    CBC Full  -  ( 12 Aug 2019 06:04 )  WBC Count : 8.71 K/uL  RBC Count : 5.01 M/uL  Hemoglobin : 14.9 g/dL  Hematocrit : 47.4 %  Platelet Count - Automated : 407 K/uL  Mean Cell Volume : 94.6 fL  Mean Cell Hemoglobin : 29.7 pg  Mean Cell Hemoglobin Concentration : 31.4 %  Auto Neutrophil # : x  Auto Lymphocyte # : x  Auto Monocyte # : x  Auto Eosinophil # : x  Auto Basophil # : x  Auto Neutrophil % : x  Auto Lymphocyte % : x  Auto Monocyte % : x  Auto Eosinophil % : x  Auto Basophil % : x    08-13    136  |  105  |  26<H>  ----------------------------<  92  5.2   |  20<L>  |  1.08    Ca    8.9      13 Aug 2019 05:04  Phos  3.2     08-13  Mg     2.2     08-13

## 2019-08-13 NOTE — PROGRESS NOTE ADULT - ASSESSMENT
54 m with IVDA, hepatitis C (not treated), RLE ORIF and hardware, previous bacteremia and lumbar osteo? 2004, presented with worsening back pain, with difficulty ambulating due to pain.  here afebrile normal WBC  blood cx: 2/2 MSSA  hep C viral load: [id number]  spine MRI: enhancing lesion on L3 s/o occult fx or a lesion but CT negative for FX or lesion  hiv negative    MSSA bacteremia and ?L3 osteo (read as occult fx or a lesion on MRI but CT negative for FX or lesion), pt also has ORIF on the LE but no symptoms there   active heroine use  repeat cx 8/12 negative for 24  TTE negative      * f/u the final blood cx on 8/12 if again positive, will need another 2 sets  * NICHELLE  * c/w Nafcillin 2 q 4

## 2019-08-13 NOTE — PROGRESS NOTE ADULT - SUBJECTIVE AND OBJECTIVE BOX
[facility]   MD JEYSON GALAN DO ANGELA WONG, PA    TEL:  OFFICE: [phone]  DR FORREST CELL: [phone]  DR. OCHOA CELL: [phone]  MARAL CHUN CELL: [phone]        Patient is a 54y old  Male who presents with a chief complaint of back pain and sciatica (13 Aug 2019 07:11)      Patient seen and examined at bedside. No chest pain/sob    VITALS:  T(F): 97.8 (08-13-19 @ 05:15), Max: 97.8 (08-13-19 @ 05:15)  HR: 70 (08-13-19 @ 05:15)  BP: 117/90 (08-13-19 @ 05:15)  RR: 18 (08-13-19 @ 05:15)  SpO2: 99% (08-13-19 @ 05:15)  Wt(kg): --    08-12 @ 07:01  -  08-13 @ 07:00  --------------------------------------------------------  IN: 350 mL / OUT: 0 mL / NET: 350 mL          PHYSICAL EXAM:  Constitutional: NAD  Neck: No JVD  Respiratory: CTAB, no wheezes, rales or rhonchi  Cardiovascular: S1, S2, RRR  Gastrointestinal: BS+, soft, NT/ND  Extremities: No peripheral edema    Hospital Medications:   MEDICATIONS  (STANDING):  gabapentin 300 milliGRAM(s) Oral three times a day  lidocaine   Patch 1 Patch Transdermal daily  melatonin 6 milliGRAM(s) Oral at bedtime  multivitamin      multivitamin 1 Tablet(s) Oral daily  nafcillin  IVPB      nafcillin  IVPB 2 Gram(s) IV Intermittent every 4 hours  pantoprazole    Tablet 40 milliGRAM(s) Oral before breakfast  sodium chloride 0.9%. 1000 milliLiter(s) (75 mL/Hr) IV Continuous <Continuous>      LABS:  08-13    136  |  105  |  26<H>  ----------------------------<  92  5.2   |  20<L>  |  1.08    Ca    8.9      13 Aug 2019 05:04  Phos  3.2     08-13  Mg     2.2     08-13      Creatinine Trend: 1.08 <--, 1.79 <--, 0.80 <--, 0.83 <--, 0.81 <--, 0.84 <--, 0.81 <--    Phosphorus Level, Serum: 3.2 mg/dL (08-13 @ 05:04)                              14.9   8.71  )-----------( 407      ( 12 Aug 2019 06:04 )             47.4     Urine Studies:  Urinalysis - [08-12-19 @ 21:20]      Color YELLOW / Appearance CLEAR / SG 1.033 / pH 5.5      Gluc NEGATIVE / Ketone NEGATIVE  / Bili NEGATIVE / Urobili NORMAL       Blood NEGATIVE / Protein 30 / Leuk Est NEGATIVE / Nitrite NEGATIVE      RBC 0-2 / WBC 3-5 / Hyaline NEGATIVE / Gran  / Sq Epi OCC / Non Sq Epi  / Bacteria NEGATIVE    Urine Creatinine 215.70      [08-12-19 @ 21:20]  Urine Protein 39.4      [08-12-19 @ 21:20]  Urine Sodium 31      [08-12-19 @ 21:20]      HCV 9.89, Reactive      [08-05-19 @ 05:25]  HIV Nonreactive The HIV Ag/Ab Combo test performed screens for HIV-1 p24  antigen, antibodies to HIV-1 (group M and group O), and  antibodies to HIV-2. All specimens repeatedly reactive  will reflex to an HIV 1/2 antibody confirmation and  differentiation test. This assay detects p24 antigen which  may be present prior to the development of HIV antibodies,  therefore a reactive result with a negative HIV 1/2 AB  Confirmation should be followed up with HIV-1 RNA, HIV-2  RNA and repeat testing in 4-8 weeks. A nonreactive result  does not preclude previous exposure to or infection with  HIV-1 or HIV-2. [facility] prohibits disclosure of this  result to any unauthorized party.      [08-10-19 @ 06:00]      RADIOLOGY & ADDITIONAL STUDIES:

## 2019-08-13 NOTE — PROGRESS NOTE ADULT - ASSESSMENT
55 yo male active heroin use with chronic back pain, kidney cyst a/w acute on chronic back pain with sciatica with difficulty ambulating due to pain 8/4. patient found to have s. aureus bacteremia on cefazolin iv. nephrology consulted for ARNOLDO.     ARNLODO  peaked 1.79   ARNOLDO likely prerenal on NSAIDs and lisinopril  renal function improved with ivf  pending renal US   off lisinopril and ibuprofen.   avoid NSAIDS, nephrotoxic agents  monitor bmp, urine output     HTN  controlled  hold ARB in view of ARNOLDO.  bp still controlled, consider norvasc 5mg daily if bp elevated   monitor     hep C  f/u GI  outpatient treatment    proteinuria  mild  p/c ratio <300, not significant   hep c + 55 yo male active heroin use with chronic back pain, kidney cyst a/w acute on chronic back pain with sciatica with difficulty ambulating due to pain 8/4. patient found to have s. aureus bacteremia on cefazolin iv. nephrology consulted for ARNOLDO.     ARNOLDO  peaked 1.79   ARNOLDO likely prerenal on NSAIDs and lisinopril  renal function improved with IVF  pending renal US   off lisinopril and ibuprofen.   avoid NSAIDS, nephrotoxic agents  monitor bmp, urine output     HTN  controlled  hold ARB in view of ARNOLDO.  bp remains controlled, consider norvasc 5mg daily if bp becomes elevated   monitor     Hep C  f/u GI  outpatient treatment    Proteinuria  mild  p/c ratio <300, Monitor  hep c +

## 2019-08-13 NOTE — DIETITIAN INITIAL EVALUATION ADULT. - PERTINENT MEDS FT
cloNIDine PRN  gabapentin  hydrOXYzine hydrochloride PRN  lidocaine   Patch  melatonin  multivitamin  multivitamin  nafcillin  IVPB  nafcillin  IVPB  nortriptyline  oxyCODONE    5 mG/acetaminophen 325 mG PRN  pantoprazole    Tablet  sodium chloride 0.9%.  tiZANidine PRN

## 2019-08-13 NOTE — PROGRESS NOTE ADULT - SUBJECTIVE AND OBJECTIVE BOX
MARCIOLUZ OBRIEN  54y  Male      Patient is a 54y old  Male who presents with a chief complaint of back pain and sciatica (13 Aug 2019 14:47)  feels fine.Back pain is controlled.no fever,no sob,no cp    REVIEW OF SYSTEMS:  as above    INTERVAL HPI/OVERNIGHT EVENTS:  T(C): 36.4 (19 @ 14:29), Max: 36.6 (19 @ 05:15)  HR: 68 (19 @ 14:29) (68 - 70)  BP: 97/77 (19 @ 14:29) (/ - 117/90)  RR: 18 (19 @ 14:29) (18 - 18)  SpO2: 100% (19 @ 14:29) (99% - 100%)  Wt(kg): --  I&O's Summary    12 Aug 2019 07:  -  13 Aug 2019 07:00  --------------------------------------------------------  IN: 350 mL / OUT: 0 mL / NET: 350 mL    13 Aug 2019 07:  -  13 Aug 2019 21:02  --------------------------------------------------------  IN: 0 mL / OUT: 175 mL / NET: -175 mL      T(C): 36.4 (19 @ 14:29), Max: 36.6 (19 @ 05:15)  HR: 68 (19 @ 14:29) (68 - 70)  BP: 97/77 (19 @ 14:29) (/77 - 117/90)  RR: 18 (19 @ 14:29) (18 - 18)  SpO2: 100% (19 @ 14:29) (99% - 100%)  Wt(kg): --Vital Signs Last 24 Hrs  T(C): 36.4 (13 Aug 2019 14:29), Max: 36.6 (13 Aug 2019 05:15)  T(F): 97.6 (13 Aug 2019 14:29), Max: 97.8 (13 Aug 2019 05:15)  HR: 68 (13 Aug 2019 14:29) (68 - 70)  BP: 97/77 (13 Aug 2019 14:29) (97/77 - 117/90)  BP(mean): --  RR: 18 (13 Aug 2019 14:29) (18 - 18)  SpO2: 100% (13 Aug 2019 14:29) (99% - 100%)    LABS:                        14.9   8.71  )-----------( 407      ( 12 Aug 2019 06:04 )             47.4     08-13    136  |  105  |  26<H>  ----------------------------<  92  5.2   |  20<L>  |  1.08    Ca    8.9      13 Aug 2019 05:04  Phos  3.2     0813  Mg     2.2     08-13        Urinalysis Basic - ( 12 Aug 2019 21:20 )    Color: YELLOW / Appearance: CLEAR / S.033 / pH: 5.5  Gluc: NEGATIVE / Ketone: NEGATIVE  / Bili: NEGATIVE / Urobili: NORMAL   Blood: NEGATIVE / Protein: 30 / Nitrite: NEGATIVE   Leuk Esterase: NEGATIVE / RBC: 0-2 / WBC 3-5   Sq Epi: OCC / Non Sq Epi: x / Bacteria: NEGATIVE      CAPILLARY BLOOD GLUCOSE            Urinalysis Basic - ( 12 Aug 2019 21:20 )    Color: YELLOW / Appearance: CLEAR / S.033 / pH: 5.5  Gluc: NEGATIVE / Ketone: NEGATIVE  / Bili: NEGATIVE / Urobili: NORMAL   Blood: NEGATIVE / Protein: 30 / Nitrite: NEGATIVE   Leuk Esterase: NEGATIVE / RBC: 0-2 / WBC 3-5   Sq Epi: OCC / Non Sq Epi: x / Bacteria: NEGATIVE        PAST MEDICAL & SURGICAL HISTORY:  Sciatica  S/P ORIF (open reduction internal fixation) fracture      MEDICATIONS  (STANDING):  gabapentin 600 milliGRAM(s) Oral three times a day  lidocaine   Patch 1 Patch Transdermal daily  melatonin 6 milliGRAM(s) Oral at bedtime  multivitamin      multivitamin 1 Tablet(s) Oral daily  nafcillin  IVPB      nafcillin  IVPB 2 Gram(s) IV Intermittent every 4 hours  pantoprazole    Tablet 40 milliGRAM(s) Oral before breakfast  sodium chloride 0.9%. 1000 milliLiter(s) (75 mL/Hr) IV Continuous <Continuous>    MEDICATIONS  (PRN):  cloNIDine 0.1 milliGRAM(s) Oral two times a day PRN withdrawl symptoms  hydrOXYzine hydrochloride 50 milliGRAM(s) Oral every 8 hours PRN Anxiety  oxyCODONE    5 mG/acetaminophen 325 mG 1 Tablet(s) Oral every 6 hours PRN Severe Pain (7 - 10)  tiZANidine 4 milliGRAM(s) Oral every 6 hours PRN back pain, muscle spasms        RADIOLOGY & ADDITIONAL TESTS:    Imaging Personally Reviewed:  [ ] YES  [ ] NO    Consultant(s) Notes Reviewed:  [ ] YES  [ ] NO    PHYSICAL EXAM:  GENERAL: NAD, well-groomed, well-developed  HEAD:  Atraumatic, Normocephalic  EYES: EOMI, PERRLA, conjunctiva and sclera clear  ENMT: No tonsillar erythema, exudates, or enlargement; Moist mucous membranes, Good dentition, No lesions  NECK: Supple, No JVD, Normal thyroid  NERVOUS SYSTEM:  Alert & Oriented X3, nonfocal  CHEST/LUNG: Clear to percussion bilaterally; No rales, rhonchi, wheezing, or rubs  HEART: Regular rate and rhythm; No murmurs, rubs, or gallops  ABDOMEN: Soft, Nontender, Nondistended; Bowel sounds present  EXTREMITIES:  2+ Peripheral Pulses, No clubbing, cyanosis, or edema  LYMPH: No lymphadenopathy noted  SKIN: No rashes or lesions    Care Discussed with Consultants/Other Providers [ ] YES  [ ] NO      Code Status: [] Full Code [] DNR [] DNI [] Goals of Care:   Disposition: [] ICU [] Stroke Unit [] RCU []PCU []Floor [] Discharge Home         ANTONETTE [email or web address]

## 2019-08-13 NOTE — PROGRESS NOTE ADULT - SUBJECTIVE AND OBJECTIVE BOX
Follow Up:  MSSA bacteremia    Interval History: pt stable and afebrile, the repeat cx  negative for 24, TTE negative    ROS:      All other systems negative    Constitutional: no fever, no chills, no weight loss, no night sweats  Eye: no eye pain, no redness, no vision changes  ENT:  no sore throat, no rhinorrhea  Cardiovascular:  no chest pain, no palpitation  Respiratory:  no SOB, no cough  GI:  no abd pain, no vomiting, no diarrhea  urinary: no dysuria, no hematuria, no flank pain  : no penile discharge or bleeding  musculoskeletal:  severe back pain  skin:  no rash  neurology:  no headache, no seizure, no change in mental status        Allergies  No Known Allergies        ANTIMICROBIALS:  nafcillin  IVPB    nafcillin  IVPB 2 every 4 hours      OTHER MEDS:  cloNIDine 0.1 milliGRAM(s) Oral two times a day PRN  gabapentin 600 milliGRAM(s) Oral three times a day  hydrOXYzine hydrochloride 50 milliGRAM(s) Oral every 8 hours PRN  lidocaine   Patch 1 Patch Transdermal daily  melatonin 6 milliGRAM(s) Oral at bedtime  multivitamin      multivitamin 1 Tablet(s) Oral daily  nortriptyline 25 milliGRAM(s) Oral at bedtime  oxyCODONE    5 mG/acetaminophen 325 mG 1 Tablet(s) Oral every 6 hours PRN  pantoprazole    Tablet 40 milliGRAM(s) Oral before breakfast  sodium chloride 0.9%. 1000 milliLiter(s) IV Continuous <Continuous>  tiZANidine 4 milliGRAM(s) Oral every 6 hours PRN      Vital Signs Last 24 Hrs  T(C): 36.4 (13 Aug 2019 14:29), Max: 36.6 (13 Aug 2019 05:15)  T(F): 97.6 (13 Aug 2019 14:29), Max: 97.8 (13 Aug 2019 05:15)  HR: 68 (13 Aug 2019 14:29) (68 - 72)  BP: 97/77 (13 Aug 2019 14:29) (97/77 - 117/90)  BP(mean): --  RR: 18 (13 Aug 2019 14:29) (18 - 18)  SpO2: 100% (13 Aug 2019 14:29) (99% - 100%)    Physical Exam:  General:    NAD, non toxic  Head: atraumatic, normocephalic  Eyes: normal sclera and conjunctiva  ENT:   no oropharyngeal lesions, no LAD, neck supple  Cardio:    regular S1,S2  Respiratory:   clear b/l, no wheezing  abd:   soft, BS +, not tender, no hepatosplenomegaly  :     no CVAT, no suprapubic tenderness, no romero  Musculoskeletal : RLE scar from ORIF but no tenderness  Skin:    no rash  vascular: no phlebitis, normal pulses  Neurologic:     no focal deficits  psych: normal affect                            14.9   8.71  )-----------( 407      ( 12 Aug 2019 06:04 )             47.4       08-13    136  |  105  |  26<H>  ----------------------------<  92  5.2   |  20<L>  |  1.08    Ca    8.9      13 Aug 2019 05:04  Phos  3.2       Mg     2.2             Urinalysis Basic - ( 12 Aug 2019 21:20 )    Color: YELLOW / Appearance: CLEAR / S.033 / pH: 5.5  Gluc: NEGATIVE / Ketone: NEGATIVE  / Bili: NEGATIVE / Urobili: NORMAL   Blood: NEGATIVE / Protein: 30 / Nitrite: NEGATIVE   Leuk Esterase: NEGATIVE / RBC: 0-2 / WBC 3-5   Sq Epi: OCC / Non Sq Epi: x / Bacteria: NEGATIVE        MICROBIOLOGY:  v  BLOOD PERIPHERAL  19 --  --  --      BLOOD VENOUS  08-10-19 --  --  --      BLOOD  19 --  --  --      BLOOD  19 --  --  Staphylococcus aureus  BLOOD CULTURE PCR                RADIOLOGY:  Images below reviewed personally  < from: CT Lumbar Spine No Cont (19 @ 12:16) >  Impression: No fractures or abnormal lesion is seen involving the L3   vertebral body. Sclerotic changes are seen involving the S1 vertebral   body.

## 2019-08-13 NOTE — DIETITIAN INITIAL EVALUATION ADULT. - OTHER INFO
Per chart review patient with medical history of chronic back pain and history of heroin drug use presenting with sciatica. Patient reports poor appetite and PO intake x1 week due to pain. NKFA. No GI distress (nausea/vomiting/diarrhea/constipation) noted at this time. Patient endorses improved appetite and PO intake in-house. Drinking Ensure Enlive to supplement PO intake.

## 2019-08-14 DIAGNOSIS — F11.20 OPIOID DEPENDENCE, UNCOMPLICATED: ICD-10-CM

## 2019-08-14 LAB
AMPHET UR-MCNC: NEGATIVE — SIGNIFICANT CHANGE UP
BACTERIA BLD CULT: SIGNIFICANT CHANGE UP
BACTERIA BLD CULT: SIGNIFICANT CHANGE UP
BARBITURATES UR SCN-MCNC: POSITIVE — SIGNIFICANT CHANGE UP
BENZODIAZ UR-MCNC: NEGATIVE — SIGNIFICANT CHANGE UP
CANNABINOIDS UR-MCNC: POSITIVE — SIGNIFICANT CHANGE UP
COCAINE METAB.OTHER UR-MCNC: NEGATIVE — SIGNIFICANT CHANGE UP
METHADONE UR-MCNC: NEGATIVE — SIGNIFICANT CHANGE UP
OPIATES UR-MCNC: POSITIVE — SIGNIFICANT CHANGE UP
OXYCODONE UR-MCNC: POSITIVE — HIGH
PCP UR-MCNC: NEGATIVE — SIGNIFICANT CHANGE UP

## 2019-08-14 PROCEDURE — 99233 SBSQ HOSP IP/OBS HIGH 50: CPT | Mod: GC

## 2019-08-14 PROCEDURE — 72158 MRI LUMBAR SPINE W/O & W/DYE: CPT | Mod: 26

## 2019-08-14 PROCEDURE — 93320 DOPPLER ECHO COMPLETE: CPT | Mod: 26,GC

## 2019-08-14 PROCEDURE — 93325 DOPPLER ECHO COLOR FLOW MAPG: CPT | Mod: 26,GC

## 2019-08-14 PROCEDURE — 93312 ECHO TRANSESOPHAGEAL: CPT | Mod: 26

## 2019-08-14 RX ORDER — OXYCODONE HYDROCHLORIDE 5 MG/1
2.5 TABLET ORAL ONCE
Refills: 0 | Status: DISCONTINUED | OUTPATIENT
Start: 2019-08-14 | End: 2019-08-14

## 2019-08-14 RX ORDER — LISINOPRIL 2.5 MG/1
10 TABLET ORAL DAILY
Refills: 0 | Status: DISCONTINUED | OUTPATIENT
Start: 2019-08-14 | End: 2019-08-17

## 2019-08-14 RX ORDER — KETOROLAC TROMETHAMINE 30 MG/ML
30 SYRINGE (ML) INJECTION ONCE
Refills: 0 | Status: DISCONTINUED | OUTPATIENT
Start: 2019-08-14 | End: 2019-08-14

## 2019-08-14 RX ADMIN — Medication 6 MILLIGRAM(S): at 23:10

## 2019-08-14 RX ADMIN — Medication 30 MILLIGRAM(S): at 22:38

## 2019-08-14 RX ADMIN — NAFCILLIN 200 GRAM(S): 10 INJECTION, POWDER, FOR SOLUTION INTRAVENOUS at 23:11

## 2019-08-14 RX ADMIN — OXYCODONE AND ACETAMINOPHEN 1 TABLET(S): 5; 325 TABLET ORAL at 22:15

## 2019-08-14 RX ADMIN — OXYCODONE AND ACETAMINOPHEN 1 TABLET(S): 5; 325 TABLET ORAL at 14:52

## 2019-08-14 RX ADMIN — Medication 30 MILLIGRAM(S): at 21:45

## 2019-08-14 RX ADMIN — Medication 15 MILLIGRAM(S): at 00:15

## 2019-08-14 RX ADMIN — TIZANIDINE 4 MILLIGRAM(S): 4 TABLET ORAL at 23:10

## 2019-08-14 RX ADMIN — NAFCILLIN 200 GRAM(S): 10 INJECTION, POWDER, FOR SOLUTION INTRAVENOUS at 09:00

## 2019-08-14 RX ADMIN — TIZANIDINE 4 MILLIGRAM(S): 4 TABLET ORAL at 08:54

## 2019-08-14 RX ADMIN — OXYCODONE AND ACETAMINOPHEN 1 TABLET(S): 5; 325 TABLET ORAL at 06:00

## 2019-08-14 RX ADMIN — LISINOPRIL 10 MILLIGRAM(S): 2.5 TABLET ORAL at 14:52

## 2019-08-14 RX ADMIN — OXYCODONE AND ACETAMINOPHEN 1 TABLET(S): 5; 325 TABLET ORAL at 15:50

## 2019-08-14 RX ADMIN — OXYCODONE AND ACETAMINOPHEN 1 TABLET(S): 5; 325 TABLET ORAL at 21:14

## 2019-08-14 RX ADMIN — OXYCODONE AND ACETAMINOPHEN 1 TABLET(S): 5; 325 TABLET ORAL at 06:30

## 2019-08-14 RX ADMIN — NAFCILLIN 200 GRAM(S): 10 INJECTION, POWDER, FOR SOLUTION INTRAVENOUS at 18:55

## 2019-08-14 RX ADMIN — NAFCILLIN 200 GRAM(S): 10 INJECTION, POWDER, FOR SOLUTION INTRAVENOUS at 05:59

## 2019-08-14 RX ADMIN — OXYCODONE HYDROCHLORIDE 2.5 MILLIGRAM(S): 5 TABLET ORAL at 17:31

## 2019-08-14 RX ADMIN — LIDOCAINE 1 PATCH: 4 CREAM TOPICAL at 23:10

## 2019-08-14 RX ADMIN — OXYCODONE HYDROCHLORIDE 2.5 MILLIGRAM(S): 5 TABLET ORAL at 18:30

## 2019-08-14 RX ADMIN — NAFCILLIN 200 GRAM(S): 10 INJECTION, POWDER, FOR SOLUTION INTRAVENOUS at 01:36

## 2019-08-14 RX ADMIN — NORTRIPTYLINE HYDROCHLORIDE 25 MILLIGRAM(S): 10 CAPSULE ORAL at 23:11

## 2019-08-14 NOTE — PROGRESS NOTE ADULT - SUBJECTIVE AND OBJECTIVE BOX
Follow Up:  MSSA bacteremia    Interval History: pt stable and afebrile, the repeat cx  negative for 24, TTE negative but complaining of new pain radiating to RLE    ROS:      All other systems negative    Constitutional: no fever, no chills, no weight loss, no night sweats  Eye: no eye pain, no redness, no vision changes  ENT:  no sore throat, no rhinorrhea  Cardiovascular:  no chest pain, no palpitation  Respiratory:  no SOB, no cough  GI:  no abd pain, no vomiting, no diarrhea  urinary: no dysuria, no hematuria, no flank pain  : no penile discharge or bleeding  musculoskeletal:  severe back pain, radiating to RLE  skin:  no rash  neurology:  no headache, no seizure, no change in mental status        Allergies  No Known Allergies        ANTIMICROBIALS:  nafcillin  IVPB    nafcillin  IVPB 2 every 4 hours      OTHER MEDS:  cloNIDine 0.1 milliGRAM(s) Oral two times a day PRN  gabapentin 600 milliGRAM(s) Oral three times a day  hydrOXYzine hydrochloride 50 milliGRAM(s) Oral every 8 hours PRN  lidocaine   Patch 1 Patch Transdermal daily  lisinopril 10 milliGRAM(s) Oral daily  melatonin 6 milliGRAM(s) Oral at bedtime  multivitamin      multivitamin 1 Tablet(s) Oral daily  nortriptyline 25 milliGRAM(s) Oral at bedtime  oxyCODONE    5 mG/acetaminophen 325 mG 1 Tablet(s) Oral every 6 hours PRN  pantoprazole    Tablet 40 milliGRAM(s) Oral before breakfast  sodium chloride 0.9%. 1000 milliLiter(s) IV Continuous <Continuous>  tiZANidine 4 milliGRAM(s) Oral every 6 hours PRN      Vital Signs Last 24 Hrs  T(C): 37.2 (14 Aug 2019 05:00), Max: 37.2 (14 Aug 2019 05:00)  T(F): 98.9 (14 Aug 2019 05:00), Max: 98.9 (14 Aug 2019 05:00)  HR: 67 (14 Aug 2019 05:00) (67 - 72)  BP: 147/95 (14 Aug 2019 05:00) (97/77 - 147/95)  BP(mean): --  RR: 18 (14 Aug 2019 05:00) (18 - 18)  SpO2: 100% (14 Aug 2019 05:00) (100% - 100%)    Physical Exam:  General:    NAD, non toxic  Head: atraumatic, normocephalic  Eyes: normal sclera and conjunctiva  ENT:   no oropharyngeal lesions, no LAD, neck supple  Cardio:    regular S1,S2  Respiratory:   clear b/l, no wheezing  abd:   soft, BS +, not tender, no hepatosplenomegaly  :     no CVAT, no suprapubic tenderness, no romero  Musculoskeletal : RLE scar from ORIF but no tenderness  Skin:    no rash  vascular: no phlebitis, normal pulses  Neurologic:     no focal deficits  psych: normal affect            136  |  105  |  26<H>  ----------------------------<  92  5.2   |  20<L>  |  1.08    Ca    8.9      13 Aug 2019 05:04  Phos  3.2       Mg     2.2             Urinalysis Basic - ( 12 Aug 2019 21:20 )    Color: YELLOW / Appearance: CLEAR / S.033 / pH: 5.5  Gluc: NEGATIVE / Ketone: NEGATIVE  / Bili: NEGATIVE / Urobili: NORMAL   Blood: NEGATIVE / Protein: 30 / Nitrite: NEGATIVE   Leuk Esterase: NEGATIVE / RBC: 0-2 / WBC 3-5   Sq Epi: OCC / Non Sq Epi: x / Bacteria: NEGATIVE        MICROBIOLOGY:  v  BLOOD PERIPHERAL  19 --  --  --      BLOOD VENOUS  08-10-19 --  --  --      BLOOD  19 --  --  --      BLOOD  19 --  --  Staphylococcus aureus  BLOOD CULTURE PCR                RADIOLOGY:  Images below reviewed personally  < from: CT Lumbar Spine No Cont (19 @ 12:16) >  Impression: No fractures or abnormal lesion is seen involving the L3   vertebral body. Sclerotic changes are seen involving the S1 vertebral   body.

## 2019-08-14 NOTE — PROGRESS NOTE ADULT - SUBJECTIVE AND OBJECTIVE BOX
LUZ BUCKLEY  54y  Male      Patient is a 54y old  Male who presents with a chief complaint of back pain and sciatica (14 Aug 2019 16:20)  Above noted.s/p NICHELLE-neg.c/o pain in back-mod-severe,requesting toradol prior to getting MRI.NO SOB,NO CP,NO FEVER,NO COUGH    REVIEW OF SYSTEMS:  as above      INTERVAL HPI/OVERNIGHT EVENTS:  T(C): 37.1 (08-14-19 @ 21:18), Max: 37.2 (08-14-19 @ 05:00)  HR: 75 (08-14-19 @ 21:18) (66 - 75)  BP: 147/101 (08-14-19 @ 21:18) (115/92 - 147/101)  RR: 16 (08-14-19 @ 21:18) (16 - 18)  SpO2: 99% (08-14-19 @ 21:18) (99% - 100%)  Wt(kg): --  I&O's Summary    13 Aug 2019 07:01  -  14 Aug 2019 07:00  --------------------------------------------------------  IN: 0 mL / OUT: 175 mL / NET: -175 mL      T(C): 37.1 (08-14-19 @ 21:18), Max: 37.2 (08-14-19 @ 05:00)  HR: 75 (08-14-19 @ 21:18) (66 - 75)  BP: 147/101 (08-14-19 @ 21:18) (115/92 - 147/101)  RR: 16 (08-14-19 @ 21:18) (16 - 18)  SpO2: 99% (08-14-19 @ 21:18) (99% - 100%)  Wt(kg): --Vital Signs Last 24 Hrs  T(C): 37.1 (14 Aug 2019 21:18), Max: 37.2 (14 Aug 2019 05:00)  T(F): 98.7 (14 Aug 2019 21:18), Max: 98.9 (14 Aug 2019 05:00)  HR: 75 (14 Aug 2019 21:18) (66 - 75)  BP: 147/101 (14 Aug 2019 21:18) (115/92 - 147/101)  BP(mean): --  RR: 16 (14 Aug 2019 21:18) (16 - 18)  SpO2: 99% (14 Aug 2019 21:18) (99% - 100%)    LABS:    08-13    136  |  105  |  26<H>  ----------------------------<  92  5.2   |  20<L>  |  1.08    Ca    8.9      13 Aug 2019 05:04  Phos  3.2     08-13  Mg     2.2     08-13          CAPILLARY BLOOD GLUCOSE                PAST MEDICAL & SURGICAL HISTORY:  Sciatica  S/P ORIF (open reduction internal fixation) fracture      MEDICATIONS  (STANDING):  gabapentin 600 milliGRAM(s) Oral three times a day  ketorolac   Injectable 30 milliGRAM(s) IV Push once  lidocaine   Patch 1 Patch Transdermal daily  lisinopril 10 milliGRAM(s) Oral daily  melatonin 6 milliGRAM(s) Oral at bedtime  multivitamin      multivitamin 1 Tablet(s) Oral daily  nafcillin  IVPB      nafcillin  IVPB 2 Gram(s) IV Intermittent every 4 hours  nortriptyline 25 milliGRAM(s) Oral at bedtime  pantoprazole    Tablet 40 milliGRAM(s) Oral before breakfast  sodium chloride 0.9%. 1000 milliLiter(s) (75 mL/Hr) IV Continuous <Continuous>    MEDICATIONS  (PRN):  cloNIDine 0.1 milliGRAM(s) Oral two times a day PRN withdrawl symptoms  hydrOXYzine hydrochloride 50 milliGRAM(s) Oral every 8 hours PRN Anxiety  oxyCODONE    5 mG/acetaminophen 325 mG 1 Tablet(s) Oral every 6 hours PRN Severe Pain (7 - 10)  tiZANidine 4 milliGRAM(s) Oral every 6 hours PRN back pain, muscle spasms        RADIOLOGY & ADDITIONAL TESTS:    Imaging Personally Reviewed:  [ ] YES  [ ] NO    Consultant(s) Notes Reviewed:  [ ] YES  [ ] NO    PHYSICAL EXAM:  GENERAL: NAD, well-groomed, well-developed  HEAD:  Atraumatic, Normocephalic  EYES: EOMI, PERRLA, conjunctiva and sclera clear  ENMT: No tonsillar erythema, exudates, or enlargement; Moist mucous membranes, Good dentition, No lesions  NECK: Supple, No JVD, Normal thyroid  NERVOUS SYSTEM:  Alert & Oriented X3, Good concentration; Motor Strength 5/5 B/L upper and lower extremities; DTRs 2+ intact and symmetric  CHEST/LUNG: Clear to percussion bilaterally; No rales, rhonchi, wheezing, or rubs  HEART: Regular rate and rhythm; No murmurs, rubs, or gallops  ABDOMEN: Soft, Nontender, Nondistended; Bowel sounds present  EXTREMITIES:  2+ Peripheral Pulses, No clubbing, cyanosis, or edema Back-mild tenderness lower back  LYMPH: No lymphadenopathy noted  SKIN: No rashes or lesions    Care Discussed with Consultants/Other Providers [ ] YES  [ ] NO      Code Status: [] Full Code [] DNR [] DNI [] Goals of Care:   Disposition: [] ICU [] Stroke Unit [] RCU []PCU []Floor [] Discharge Home         CECY [email or web address].FACP

## 2019-08-14 NOTE — PROGRESS NOTE ADULT - SUBJECTIVE AND OBJECTIVE BOX
[facility]   MD JEYSON GALAN DO ANGELA WONG, PA    TEL:  OFFICE: [phone]  DR FORREST CELL: [phone]  DR. OCHOA CELL: [phone]  MARAL CHUN CELL: [phone]        Patient is a 54y old  Male who presents with a chief complaint of back pain and sciatica (14 Aug 2019 10:03)      Patient seen and examined at bedside. No chest pain/sob    VITALS:  T(F): 98.7 (08-14-19 @ 14:49), Max: 98.9 (08-14-19 @ 05:00)  HR: 66 (08-14-19 @ 14:49)  BP: 145/101 (08-14-19 @ 14:49)  RR: 18 (08-14-19 @ 14:49)  SpO2: 100% (08-14-19 @ 14:49)  Wt(kg): --    08-13 @ 07:01  -  08-14 @ 07:00  --------------------------------------------------------  IN: 0 mL / OUT: 175 mL / NET: -175 mL          PHYSICAL EXAM:  Constitutional: NAD  Neck: No JVD  Respiratory: CTAB, no wheezes, rales or rhonchi  Cardiovascular: S1, S2, RRR  Gastrointestinal: BS+, soft, NT/ND  Extremities: No peripheral edema    Hospital Medications:   MEDICATIONS  (STANDING):  gabapentin 600 milliGRAM(s) Oral three times a day  lidocaine   Patch 1 Patch Transdermal daily  lisinopril 10 milliGRAM(s) Oral daily  melatonin 6 milliGRAM(s) Oral at bedtime  multivitamin      multivitamin 1 Tablet(s) Oral daily  nafcillin  IVPB      nafcillin  IVPB 2 Gram(s) IV Intermittent every 4 hours  nortriptyline 25 milliGRAM(s) Oral at bedtime  pantoprazole    Tablet 40 milliGRAM(s) Oral before breakfast  sodium chloride 0.9%. 1000 milliLiter(s) (75 mL/Hr) IV Continuous <Continuous>      LABS:  08-13    136  |  105  |  26<H>  ----------------------------<  92  5.2   |  20<L>  |  1.08    Ca    8.9      13 Aug 2019 05:04  Phos  3.2     08-13  Mg     2.2     08-13      Creatinine Trend: 1.08 <--, 1.79 <--, 0.80 <--, 0.83 <--, 0.81 <--, 0.84 <--            Urine Studies:  Urinalysis - [08-12-19 @ 21:20]      Color YELLOW / Appearance CLEAR / SG 1.033 / pH 5.5      Gluc NEGATIVE / Ketone NEGATIVE  / Bili NEGATIVE / Urobili NORMAL       Blood NEGATIVE / Protein 30 / Leuk Est NEGATIVE / Nitrite NEGATIVE      RBC 0-2 / WBC 3-5 / Hyaline NEGATIVE / Gran  / Sq Epi OCC / Non Sq Epi  / Bacteria NEGATIVE    Urine Creatinine 215.70      [08-12-19 @ 21:20]  Urine Protein 39.4      [08-12-19 @ 21:20]  Urine Sodium 31      [08-12-19 @ 21:20]      HCV 9.89, Reactive      [08-05-19 @ 05:25]  HIV Nonreactive The HIV Ag/Ab Combo test performed screens for HIV-1 p24  antigen, antibodies to HIV-1 (group M and group O), and  antibodies to HIV-2. All specimens repeatedly reactive  will reflex to an HIV 1/2 antibody confirmation and  differentiation test. This assay detects p24 antigen which  may be present prior to the development of HIV antibodies,  therefore a reactive result with a negative HIV 1/2 AB  Confirmation should be followed up with HIV-1 RNA, HIV-2  RNA and repeat testing in 4-8 weeks. A nonreactive result  does not preclude previous exposure to or infection with  HIV-1 or HIV-2. [facility] prohibits disclosure of this  result to any unauthorized party.      [08-10-19 @ 06:00]      RADIOLOGY & ADDITIONAL STUDIES:

## 2019-08-14 NOTE — PROGRESS NOTE ADULT - SUBJECTIVE AND OBJECTIVE BOX
LUZ BUCKLEY:[id number],   54yMale followed for:  No Known Allergies    PAST MEDICAL & SURGICAL HISTORY:  Sciatica  S/P ORIF (open reduction internal fixation) fracture    FAMILY HISTORY:    MEDICATIONS  (STANDING):  gabapentin 600 milliGRAM(s) Oral three times a day  lidocaine   Patch 1 Patch Transdermal daily  melatonin 6 milliGRAM(s) Oral at bedtime  multivitamin      multivitamin 1 Tablet(s) Oral daily  nafcillin  IVPB      nafcillin  IVPB 2 Gram(s) IV Intermittent every 4 hours  nortriptyline 25 milliGRAM(s) Oral at bedtime  pantoprazole    Tablet 40 milliGRAM(s) Oral before breakfast  sodium chloride 0.9%. 1000 milliLiter(s) (75 mL/Hr) IV Continuous <Continuous>    MEDICATIONS  (PRN):  cloNIDine 0.1 milliGRAM(s) Oral two times a day PRN withdrawl symptoms  hydrOXYzine hydrochloride 50 milliGRAM(s) Oral every 8 hours PRN Anxiety  oxyCODONE    5 mG/acetaminophen 325 mG 1 Tablet(s) Oral every 6 hours PRN Severe Pain (7 - 10)  tiZANidine 4 milliGRAM(s) Oral every 6 hours PRN back pain, muscle spasms      Vital Signs Last 24 Hrs  T(C): 37.2 (14 Aug 2019 05:00), Max: 37.2 (14 Aug 2019 05:00)  T(F): 98.9 (14 Aug 2019 05:00), Max: 98.9 (14 Aug 2019 05:00)  HR: 67 (14 Aug 2019 05:00) (67 - 72)  BP: 147/95 (14 Aug 2019 05:00) (97/77 - 147/95)  BP(mean): --  RR: 18 (14 Aug 2019 05:00) (18 - 18)  SpO2: 100% (14 Aug 2019 05:00) (100% - 100%)  nc/at  s1s2  cta  soft, nt, nd no guarding or rebound  no c/c/e      08-13    136  |  105  |  26<H>  ----------------------------<  92  5.2   |  20<L>  |  1.08    Ca    8.9      13 Aug 2019 05:04  Phos  3.2     08-13  Mg     2.2     08-13

## 2019-08-14 NOTE — PROGRESS NOTE ADULT - ASSESSMENT
53 yo male active heroin use with chronic back pain, kidney cyst a/w acute on chronic back pain with sciatica with difficulty ambulating due to pain 8/4. patient found to have s. aureus bacteremia on cefazolin iv. nephrology consulted for ARNOLDO.     ARNOLDO  peaked 1.79   ARNOLDO likely prerenal on NSAIDs and lisinopril  renal function improved with IVF  pending renal US   off ibuprofen.   restarted on lisinopril 10 8/12 by team  avoid NSAIDS, nephrotoxic agents  monitor bmp, urine output     HTN  controlled  restarted on lisinopril by team  monitor bmp and bp    Hep C  f/u GI  outpatient treatment    Proteinuria  mild  p/c ratio <300, Monitor  hep c +

## 2019-08-14 NOTE — PROGRESS NOTE ADULT - ASSESSMENT
54 m with IVDA, hepatitis C (not treated), RLE ORIF and hardware, previous bacteremia and lumbar osteo? 2004, presented with worsening back pain, with difficulty ambulating due to pain.  here afebrile normal WBC  blood cx: 2/2 MSSA  hep C viral load: [id number]  spine MRI: enhancing lesion on L3 s/o occult fx or a lesion but CT negative for FX or lesion  hiv negative    MSSA bacteremia and ?L3 osteo (read as occult fx or a lesion on MRI but CT negative for FX or lesion), pt also has ORIF on the LE but no symptoms there   active heroine use  repeat cx 8/12 negative for 48  TTE negative      * f/u the final blood cx on 8/12 if again positive, will need another 2 sets  * NICHELLE  * c/w Nafcillin 2 q 4  * has a new radiating pain to RLE, would repeat the lumbar MRI

## 2019-08-15 LAB
ANION GAP SERPL CALC-SCNC: 11 MMO/L — SIGNIFICANT CHANGE UP (ref 7–14)
BUN SERPL-MCNC: 21 MG/DL — SIGNIFICANT CHANGE UP (ref 7–23)
CALCIUM SERPL-MCNC: 8.8 MG/DL — SIGNIFICANT CHANGE UP (ref 8.4–10.5)
CHLORIDE SERPL-SCNC: 107 MMOL/L — SIGNIFICANT CHANGE UP (ref 98–107)
CO2 SERPL-SCNC: 23 MMOL/L — SIGNIFICANT CHANGE UP (ref 22–31)
CREAT SERPL-MCNC: 0.9 MG/DL — SIGNIFICANT CHANGE UP (ref 0.5–1.3)
GLUCOSE SERPL-MCNC: 135 MG/DL — HIGH (ref 70–99)
HCT VFR BLD CALC: 42.2 % — SIGNIFICANT CHANGE UP (ref 39–50)
HGB BLD-MCNC: 13.6 G/DL — SIGNIFICANT CHANGE UP (ref 13–17)
MAGNESIUM SERPL-MCNC: 1.9 MG/DL — SIGNIFICANT CHANGE UP (ref 1.6–2.6)
MCHC RBC-ENTMCNC: 29.7 PG — SIGNIFICANT CHANGE UP (ref 27–34)
MCHC RBC-ENTMCNC: 32.2 % — SIGNIFICANT CHANGE UP (ref 32–36)
MCV RBC AUTO: 92.1 FL — SIGNIFICANT CHANGE UP (ref 80–100)
NRBC # FLD: 0 K/UL — SIGNIFICANT CHANGE UP (ref 0–0)
PHOSPHATE SERPL-MCNC: 2.8 MG/DL — SIGNIFICANT CHANGE UP (ref 2.5–4.5)
PLATELET # BLD AUTO: 394 K/UL — SIGNIFICANT CHANGE UP (ref 150–400)
PMV BLD: 10 FL — SIGNIFICANT CHANGE UP (ref 7–13)
POTASSIUM SERPL-MCNC: 4.4 MMOL/L — SIGNIFICANT CHANGE UP (ref 3.5–5.3)
POTASSIUM SERPL-SCNC: 4.4 MMOL/L — SIGNIFICANT CHANGE UP (ref 3.5–5.3)
RBC # BLD: 4.58 M/UL — SIGNIFICANT CHANGE UP (ref 4.2–5.8)
RBC # FLD: 13.9 % — SIGNIFICANT CHANGE UP (ref 10.3–14.5)
SODIUM SERPL-SCNC: 141 MMOL/L — SIGNIFICANT CHANGE UP (ref 135–145)
WBC # BLD: 6.62 K/UL — SIGNIFICANT CHANGE UP (ref 3.8–10.5)
WBC # FLD AUTO: 6.62 K/UL — SIGNIFICANT CHANGE UP (ref 3.8–10.5)

## 2019-08-15 PROCEDURE — 99233 SBSQ HOSP IP/OBS HIGH 50: CPT

## 2019-08-15 RX ORDER — OXYCODONE AND ACETAMINOPHEN 5; 325 MG/1; MG/1
1 TABLET ORAL EVERY 6 HOURS
Refills: 0 | Status: DISCONTINUED | OUTPATIENT
Start: 2019-08-15 | End: 2019-08-17

## 2019-08-15 RX ADMIN — LIDOCAINE 1 PATCH: 4 CREAM TOPICAL at 05:52

## 2019-08-15 RX ADMIN — OXYCODONE AND ACETAMINOPHEN 1 TABLET(S): 5; 325 TABLET ORAL at 17:19

## 2019-08-15 RX ADMIN — OXYCODONE AND ACETAMINOPHEN 1 TABLET(S): 5; 325 TABLET ORAL at 10:33

## 2019-08-15 RX ADMIN — GABAPENTIN 600 MILLIGRAM(S): 400 CAPSULE ORAL at 22:00

## 2019-08-15 RX ADMIN — NAFCILLIN 200 GRAM(S): 10 INJECTION, POWDER, FOR SOLUTION INTRAVENOUS at 05:51

## 2019-08-15 RX ADMIN — NAFCILLIN 200 GRAM(S): 10 INJECTION, POWDER, FOR SOLUTION INTRAVENOUS at 09:39

## 2019-08-15 RX ADMIN — NAFCILLIN 200 GRAM(S): 10 INJECTION, POWDER, FOR SOLUTION INTRAVENOUS at 02:00

## 2019-08-15 RX ADMIN — Medication 6 MILLIGRAM(S): at 22:01

## 2019-08-15 RX ADMIN — OXYCODONE AND ACETAMINOPHEN 1 TABLET(S): 5; 325 TABLET ORAL at 23:20

## 2019-08-15 RX ADMIN — LISINOPRIL 10 MILLIGRAM(S): 2.5 TABLET ORAL at 05:52

## 2019-08-15 RX ADMIN — TIZANIDINE 4 MILLIGRAM(S): 4 TABLET ORAL at 05:52

## 2019-08-15 RX ADMIN — OXYCODONE AND ACETAMINOPHEN 1 TABLET(S): 5; 325 TABLET ORAL at 03:44

## 2019-08-15 RX ADMIN — NORTRIPTYLINE HYDROCHLORIDE 25 MILLIGRAM(S): 10 CAPSULE ORAL at 22:02

## 2019-08-15 RX ADMIN — NAFCILLIN 200 GRAM(S): 10 INJECTION, POWDER, FOR SOLUTION INTRAVENOUS at 22:00

## 2019-08-15 RX ADMIN — NAFCILLIN 200 GRAM(S): 10 INJECTION, POWDER, FOR SOLUTION INTRAVENOUS at 17:50

## 2019-08-15 RX ADMIN — OXYCODONE AND ACETAMINOPHEN 1 TABLET(S): 5; 325 TABLET ORAL at 09:43

## 2019-08-15 RX ADMIN — OXYCODONE AND ACETAMINOPHEN 1 TABLET(S): 5; 325 TABLET ORAL at 04:36

## 2019-08-15 RX ADMIN — OXYCODONE AND ACETAMINOPHEN 1 TABLET(S): 5; 325 TABLET ORAL at 16:38

## 2019-08-15 RX ADMIN — LIDOCAINE 1 PATCH: 4 CREAM TOPICAL at 11:32

## 2019-08-15 RX ADMIN — Medication 1 TABLET(S): at 11:37

## 2019-08-15 RX ADMIN — TIZANIDINE 4 MILLIGRAM(S): 4 TABLET ORAL at 16:41

## 2019-08-15 RX ADMIN — OXYCODONE AND ACETAMINOPHEN 1 TABLET(S): 5; 325 TABLET ORAL at 10:15

## 2019-08-15 RX ADMIN — NAFCILLIN 200 GRAM(S): 10 INJECTION, POWDER, FOR SOLUTION INTRAVENOUS at 13:16

## 2019-08-15 NOTE — PROGRESS NOTE ADULT - SUBJECTIVE AND OBJECTIVE BOX
LUZ BUCKLEY:[id number],   54yMale followed for:  No Known Allergies    PAST MEDICAL & SURGICAL HISTORY:  Sciatica  S/P ORIF (open reduction internal fixation) fracture    FAMILY HISTORY:    MEDICATIONS  (STANDING):  gabapentin 600 milliGRAM(s) Oral three times a day  lidocaine   Patch 1 Patch Transdermal daily  lisinopril 10 milliGRAM(s) Oral daily  melatonin 6 milliGRAM(s) Oral at bedtime  multivitamin      multivitamin 1 Tablet(s) Oral daily  nafcillin  IVPB      nafcillin  IVPB 2 Gram(s) IV Intermittent every 4 hours  nortriptyline 25 milliGRAM(s) Oral at bedtime  pantoprazole    Tablet 40 milliGRAM(s) Oral before breakfast  sodium chloride 0.9%. 1000 milliLiter(s) (75 mL/Hr) IV Continuous <Continuous>    MEDICATIONS  (PRN):  cloNIDine 0.1 milliGRAM(s) Oral two times a day PRN withdrawl symptoms  hydrOXYzine hydrochloride 50 milliGRAM(s) Oral every 8 hours PRN Anxiety  oxyCODONE    5 mG/acetaminophen 325 mG 1 Tablet(s) Oral every 6 hours PRN Severe Pain (7 - 10)  tiZANidine 4 milliGRAM(s) Oral every 6 hours PRN back pain, muscle spasms      Vital Signs Last 24 Hrs  T(C): 36.7 (15 Aug 2019 06:18), Max: 37.1 (14 Aug 2019 14:49)  T(F): 98.1 (15 Aug 2019 06:18), Max: 98.7 (14 Aug 2019 14:49)  HR: 72 (15 Aug 2019 06:18) (66 - 75)  BP: 127/72 (15 Aug 2019 06:18) (127/72 - 147/101)  BP(mean): --  RR: 16 (15 Aug 2019 06:18) (16 - 18)  SpO2: 98% (15 Aug 2019 06:18) (98% - 100%)  nc/at  s1s2  cta  soft, nt, nd no guarding or rebound  no c/c/e    CBC Full  -  ( 15 Aug 2019 06:15 )  WBC Count : 6.62 K/uL  RBC Count : 4.58 M/uL  Hemoglobin : 13.6 g/dL  Hematocrit : 42.2 %  Platelet Count - Automated : 394 K/uL  Mean Cell Volume : 92.1 fL  Mean Cell Hemoglobin : 29.7 pg  Mean Cell Hemoglobin Concentration : 32.2 %  Auto Neutrophil # : x  Auto Lymphocyte # : x  Auto Monocyte # : x  Auto Eosinophil # : x  Auto Basophil # : x  Auto Neutrophil % : x  Auto Lymphocyte % : x  Auto Monocyte % : x  Auto Eosinophil % : x  Auto Basophil % : x    08-15    141  |  107  |  21  ----------------------------<  135<H>  4.4   |  23  |  0.90    Ca    8.8      15 Aug 2019 06:15  Phos  2.8     08-15  Mg     1.9     08-15

## 2019-08-15 NOTE — PROGRESS NOTE ADULT - SUBJECTIVE AND OBJECTIVE BOX
[facility] [place]   MD JEYSON GALAN DO ANGELA WONG, PA    TEL:  OFFICE: [phone]  DR FORREST CELL: [phone]  DR. OCHOA CELL: [phone]  MARAL CHUN CELL: [phone]        Patient is a 54y old  Male who presents with a chief complaint of back pain and sciatica (15 Aug 2019 11:06)      Patient seen and examined at bedside. No chest pain/sob    VITALS:  T(F): 98.1 (08-15-19 @ 06:18), Max: 98.7 (08-14-19 @ 14:49)  HR: 72 (08-15-19 @ 06:18)  BP: 127/72 (08-15-19 @ 06:18)  RR: 16 (08-15-19 @ 06:18)  SpO2: 98% (08-15-19 @ 06:18)  Wt(kg): --        PHYSICAL EXAM:  Constitutional: NAD  Neck: No JVD  Respiratory: CTAB, no wheezes, rales or rhonchi  Cardiovascular: S1, S2, RRR  Gastrointestinal: BS+, soft, NT/ND  Extremities: No peripheral edema    Hospital Medications:   MEDICATIONS  (STANDING):  gabapentin 600 milliGRAM(s) Oral three times a day  lidocaine   Patch 1 Patch Transdermal daily  lisinopril 10 milliGRAM(s) Oral daily  melatonin 6 milliGRAM(s) Oral at bedtime  multivitamin      multivitamin 1 Tablet(s) Oral daily  nafcillin  IVPB      nafcillin  IVPB 2 Gram(s) IV Intermittent every 4 hours  nortriptyline 25 milliGRAM(s) Oral at bedtime  pantoprazole    Tablet 40 milliGRAM(s) Oral before breakfast  sodium chloride 0.9%. 1000 milliLiter(s) (75 mL/Hr) IV Continuous <Continuous>      LABS:  08-15    141  |  107  |  21  ----------------------------<  135<H>  4.4   |  23  |  0.90    Ca    8.8      15 Aug 2019 06:15  Phos  2.8     08-15  Mg     1.9     08-15      Creatinine Trend: 0.90 <--, 1.08 <--, 1.79 <--, 0.80 <--, 0.83 <--, 0.81 <--    Phosphorus Level, Serum: 2.8 mg/dL (08-15 @ 06:15)                              13.6   6.62  )-----------( 394      ( 15 Aug 2019 06:15 )             42.2     Urine Studies:  Urinalysis - [08-12-19 @ 21:20]      Color YELLOW / Appearance CLEAR / SG 1.033 / pH 5.5      Gluc NEGATIVE / Ketone NEGATIVE  / Bili NEGATIVE / Urobili NORMAL       Blood NEGATIVE / Protein 30 / Leuk Est NEGATIVE / Nitrite NEGATIVE      RBC 0-2 / WBC 3-5 / Hyaline NEGATIVE / Gran  / Sq Epi OCC / Non Sq Epi  / Bacteria NEGATIVE    Urine Creatinine 215.70      [08-12-19 @ 21:20]  Urine Protein 39.4      [08-12-19 @ 21:20]  Urine Sodium 31      [08-12-19 @ 21:20]      HCV 9.89, Reactive      [08-05-19 @ 05:25]  HIV Nonreactive The HIV Ag/Ab Combo test performed screens for HIV-1 p24  antigen, antibodies to HIV-1 (group M and group O), and  antibodies to HIV-2. All specimens repeatedly reactive  will reflex to an HIV 1/2 antibody confirmation and  differentiation test. This assay detects p24 antigen which  may be present prior to the development of HIV antibodies,  therefore a reactive result with a negative HIV 1/2 AB  Confirmation should be followed up with HIV-1 RNA, HIV-2  RNA and repeat testing in 4-8 weeks. A nonreactive result  does not preclude previous exposure to or infection with  HIV-1 or HIV-2. [facility] prohibits disclosure of this  result to any unauthorized party.      [08-10-19 @ 06:00]      RADIOLOGY & ADDITIONAL STUDIES:

## 2019-08-15 NOTE — PROGRESS NOTE ADULT - ASSESSMENT
54 m with IVDA, hepatitis C (not treated), RLE ORIF and hardware, previous bacteremia and lumbar osteo? 2004, presented with worsening back pain, with difficulty ambulating due to pain.  here afebrile normal WBC  blood cx: 2/2 MSSA  hep C viral load: [id number]  spine MRI: enhancing lesion on L3 s/o occult fx or a lesion but CT negative for FX or lesion  hiv negative    MSSA bacteremia and ?L3 osteo (read as occult fx or a lesion on MRI but CT negative for FX or lesion), pt also has ORIF on the LE but no symptoms there   active heroine use  repeat cx 8/12 negative   TTE negative  repeat MRi again with L3 enhancement which could be post traumatic or infection      * c/w Nafcillin 2 q 4  * pt can not be discharged with a line in view of active IVDA  * will have continue the course here or another rehab facility  * will do an 8 week course in view of L3 enhancement and worsened back pain when he was bacteremic

## 2019-08-15 NOTE — PROGRESS NOTE ADULT - SUBJECTIVE AND OBJECTIVE BOX
Follow Up:  MSSA bacteremia    Interval History: pt stable and afebrile, the repeat cx 8/12 negative for 24, TTE negative and repeat MRI still with enhancement on L3 which could be post traumatic of infection    ROS:      All other systems negative    Constitutional: no fever, no chills, no weight loss, no night sweats  Eye: no eye pain, no redness, no vision changes  ENT:  no sore throat, no rhinorrhea  Cardiovascular:  no chest pain, no palpitation  Respiratory:  no SOB, no cough  GI:  no abd pain, no vomiting, no diarrhea  urinary: no dysuria, no hematuria, no flank pain  : no penile discharge or bleeding  musculoskeletal:  severe back pain, radiating to RLE  skin:  no rash  neurology:  no headache, no seizure, no change in mental status        Allergies  No Known Allergies        ANTIMICROBIALS:  nafcillin  IVPB    nafcillin  IVPB 2 every 4 hours      OTHER MEDS:  cloNIDine 0.1 milliGRAM(s) Oral two times a day PRN  gabapentin 600 milliGRAM(s) Oral three times a day  hydrOXYzine hydrochloride 50 milliGRAM(s) Oral every 8 hours PRN  lidocaine   Patch 1 Patch Transdermal daily  lisinopril 10 milliGRAM(s) Oral daily  melatonin 6 milliGRAM(s) Oral at bedtime  multivitamin      multivitamin 1 Tablet(s) Oral daily  nortriptyline 25 milliGRAM(s) Oral at bedtime  oxyCODONE    5 mG/acetaminophen 325 mG 1 Tablet(s) Oral every 6 hours PRN  pantoprazole    Tablet 40 milliGRAM(s) Oral before breakfast  sodium chloride 0.9%. 1000 milliLiter(s) IV Continuous <Continuous>  tiZANidine 4 milliGRAM(s) Oral every 6 hours PRN      Vital Signs Last 24 Hrs  T(C): 36.7 (15 Aug 2019 06:18), Max: 37.1 (14 Aug 2019 14:49)  T(F): 98.1 (15 Aug 2019 06:18), Max: 98.7 (14 Aug 2019 14:49)  HR: 72 (15 Aug 2019 06:18) (66 - 75)  BP: 127/72 (15 Aug 2019 06:18) (127/72 - 147/101)  BP(mean): --  RR: 16 (15 Aug 2019 06:18) (16 - 18)  SpO2: 98% (15 Aug 2019 06:18) (98% - 100%)    Physical Exam:  General:    NAD, non toxic  Head: atraumatic, normocephalic  Eyes: normal sclera and conjunctiva  ENT:   no oropharyngeal lesions, no LAD, neck supple  Cardio:    regular S1,S2  Respiratory:   clear b/l, no wheezing  abd:   soft, BS +, not tender, no hepatosplenomegaly  :     no CVAT, no suprapubic tenderness, no romero  Musculoskeletal : RLE scar from ORIF but no tenderness  Skin:    no rash  vascular: no phlebitis, normal pulses  Neurologic:     no focal deficits  psych: normal affect                            13.6   6.62  )-----------( 394      ( 15 Aug 2019 06:15 )             42.2       08-15    141  |  107  |  21  ----------------------------<  135<H>  4.4   |  23  |  0.90    Ca    8.8      15 Aug 2019 06:15  Phos  2.8     08-15  Mg     1.9     08-15            MICROBIOLOGY:  v  BLOOD PERIPHERAL  08-12-19 --  --  --      BLOOD VENOUS  08-10-19 --  --  --      BLOOD  08-08-19 --  --  --      BLOOD  08-07-19 --  --  Staphylococcus aureus  BLOOD CULTURE PCR                RADIOLOGY:  Images below reviewed personally  < from: MR Lumbar Spine w/wo IV Cont (08.14.19 @ 23:21) >    MRI of the lumbar spine was performed using sagittal T1-T2 and STIR   sequence. Axial T1 and T2-weighted sequences were performed. The patient   was injected with approximately 7.5 cc of Gadavist IV and sagittal   T1-weighted sequence was performed with fat suppression. Axial   T1-weighted sequence was performed.    Loss of normal lumbar lordosis is seen which could be due to positional   muscle spasm.    The vertebral body height appears maintained.    Abnormal T1 and T2 prolongation is again seen involving the L3 vertebral   body with associated enhancement. This could be compatible with a   posttraumatic and/or osteoporotic compression fracture, though the   possibility of underlying infection cannot be entirely excluded though   less likely given the lack of significant change since the prior study.   Clinical correlation continued close and [place] recommended.No epidural or   paraspinal extension of this process is identified.    T12-L1: Normal    L1-2: Normal    L2-3: Normal    L3-4: Normal    L4-5: Normal    L5-S1: Bilateral hypertrophic facet joint changes seen. Mild to moderate   narrowing of the leftneural foramen is seen.    The conus ends at bottom of L2 and appears normal    Evaluation of the paraspinal soft tissues appear normal.    Left renal cyst is seen.    Impression: Abnormal T1 and T2 prolongation with associated enhancement   is seen involving the L3 vertebral body as described above.    < from: Transesophageal Echocardiogram w/o TTE (08.14.19 @ 15:13) >  CONCLUSIONS:  1. Normal mitral valve. Mild mitral regurgitation.  2. Normal trileaflet aortic valve.  3. Normal aortic root, aortic arch and descending thoracic  aorta.  4. Normal left atrium. No left atrial or left atrial  appendage thrombus.  5. Right ventricular enlargement with decreased right  ventricular systolic function.  6. Tricuspid valve prolapse of the septal and anterior  leaflets. Severe central tricuspid regurgitation.  7. Normal pulmonicvalve.  No evidence of endocarditis.  ------------------------------------------------------------------------

## 2019-08-15 NOTE — PROGRESS NOTE ADULT - SUBJECTIVE AND OBJECTIVE BOX
MARCIO LUZ  54y  Male      Patient is a 54y old  Male who presents with a chief complaint of back pain and sciatica (15 Aug 2019 12:35)  -c/o mild back pain.no sob,no cp,no fever,no cough    REVIEW OF SYSTEMS:  AS ABOVE  INTERVAL HPI/OVERNIGHT EVENTS:  T(C): 36.7 (08-15-19 @ 15:19), Max: 37.1 (08-14-19 @ 21:18)  HR: 75 (08-15-19 @ 15:19) (72 - 75)  BP: 174/107 (08-15-19 @ 15:19) (127/72 - 174/107)  RR: 16 (08-15-19 @ 15:19) (16 - 16)  SpO2: 99% (08-15-19 @ 15:19) (98% - 99%)  Wt(kg): --  I&O's Summary    T(C): 36.7 (08-15-19 @ 15:19), Max: 37.1 (08-14-19 @ 21:18)  HR: 75 (08-15-19 @ 15:19) (72 - 75)  BP: 174/107 (08-15-19 @ 15:19) (127/72 - 174/107)  RR: 16 (08-15-19 @ 15:19) (16 - 16)  SpO2: 99% (08-15-19 @ 15:19) (98% - 99%)  Wt(kg): --Vital Signs Last 24 Hrs  T(C): 36.7 (15 Aug 2019 15:19), Max: 37.1 (14 Aug 2019 21:18)  T(F): 98 (15 Aug 2019 15:19), Max: 98.7 (14 Aug 2019 21:18)  HR: 75 (15 Aug 2019 15:19) (72 - 75)  BP: 174/107 (15 Aug 2019 15:19) (127/72 - 174/107)  BP(mean): --  RR: 16 (15 Aug 2019 15:19) (16 - 16)  SpO2: 99% (15 Aug 2019 15:19) (98% - 99%)    LABS:                        13.6   6.62  )-----------( 394      ( 15 Aug 2019 06:15 )             42.2     08-15    141  |  107  |  21  ----------------------------<  135<H>  4.4   |  23  |  0.90    Ca    8.8      15 Aug 2019 06:15  Phos  2.8     08-15  Mg     1.9     08-15          CAPILLARY BLOOD GLUCOSE                PAST MEDICAL & SURGICAL HISTORY:  Sciatica  S/P ORIF (open reduction internal fixation) fracture      MEDICATIONS  (STANDING):  gabapentin 600 milliGRAM(s) Oral three times a day  lidocaine   Patch 1 Patch Transdermal daily  lisinopril 10 milliGRAM(s) Oral daily  melatonin 6 milliGRAM(s) Oral at bedtime  multivitamin      multivitamin 1 Tablet(s) Oral daily  nafcillin  IVPB      nafcillin  IVPB 2 Gram(s) IV Intermittent every 4 hours  nortriptyline 25 milliGRAM(s) Oral at bedtime  pantoprazole    Tablet 40 milliGRAM(s) Oral before breakfast  sodium chloride 0.9%. 1000 milliLiter(s) (75 mL/Hr) IV Continuous <Continuous>    MEDICATIONS  (PRN):  cloNIDine 0.1 milliGRAM(s) Oral two times a day PRN withdrawl symptoms  hydrOXYzine hydrochloride 50 milliGRAM(s) Oral every 8 hours PRN Anxiety  oxyCODONE    5 mG/acetaminophen 325 mG 1 Tablet(s) Oral every 6 hours PRN Severe Pain (7 - 10)  tiZANidine 4 milliGRAM(s) Oral every 6 hours PRN back pain, muscle spasms        RADIOLOGY & ADDITIONAL TESTS:    Imaging Personally Reviewed:  [ ] YES  [ ] NO    Consultant(s) Notes Reviewed:  [ ] YES  [ ] NO    PHYSICAL EXAM:  GENERAL: NAD, well-groomed, well-developed  HEAD:  Atraumatic, Normocephalic  EYES: EOMI, PERRLA, conjunctiva and sclera clear  ENMT: No tonsillar erythema, exudates, or enlargement; Moist mucous membranes, Good dentition, No lesions  NECK: Supple, No JVD, Normal thyroid  NERVOUS SYSTEM:  Alert & Oriented X3, NONFOCAL  CHEST/LUNG: Clear to percussion bilaterally; No rales, rhonchi, wheezing, or rubs  HEART: Regular rate and rhythm; No murmurs, rubs, or gallops  ABDOMEN: Soft, Nontender, Nondistended; Bowel sounds present  EXTREMITIES:  2+ Peripheral Pulses, No clubbing, cyanosis, or edema  LYMPH: No lymphadenopathy noted  SKIN: No rashes or lesions    Care Discussed with Consultants/Other Providers [ ] YES  [ ] NO      Code Status: [] Full Code [] DNR [] DNI [] Goals of Care:   Disposition: [] ICU [] Stroke Unit [] RCU []PCU []Floor [] Discharge Home         ANTONETTE RodriguezFACP

## 2019-08-16 PROCEDURE — 99233 SBSQ HOSP IP/OBS HIGH 50: CPT

## 2019-08-16 RX ORDER — HYDRALAZINE HCL 50 MG
10 TABLET ORAL ONCE
Refills: 0 | Status: COMPLETED | OUTPATIENT
Start: 2019-08-16 | End: 2019-08-16

## 2019-08-16 RX ORDER — TIZANIDINE 4 MG/1
4 TABLET ORAL EVERY 6 HOURS
Refills: 0 | Status: DISCONTINUED | OUTPATIENT
Start: 2019-08-19 | End: 2019-09-06

## 2019-08-16 RX ORDER — TIZANIDINE 4 MG/1
4 TABLET ORAL EVERY 6 HOURS
Refills: 0 | Status: COMPLETED | OUTPATIENT
Start: 2019-08-16 | End: 2019-08-18

## 2019-08-16 RX ORDER — KETOROLAC TROMETHAMINE 30 MG/ML
15 SYRINGE (ML) INJECTION EVERY 6 HOURS
Refills: 0 | Status: DISCONTINUED | OUTPATIENT
Start: 2019-08-16 | End: 2019-08-18

## 2019-08-16 RX ORDER — SODIUM CHLORIDE 9 MG/ML
1000 INJECTION INTRAMUSCULAR; INTRAVENOUS; SUBCUTANEOUS
Refills: 0 | Status: DISCONTINUED | OUTPATIENT
Start: 2019-08-16 | End: 2019-08-18

## 2019-08-16 RX ORDER — ACETAMINOPHEN 500 MG
650 TABLET ORAL EVERY 6 HOURS
Refills: 0 | Status: DISCONTINUED | OUTPATIENT
Start: 2019-08-16 | End: 2019-08-17

## 2019-08-16 RX ADMIN — OXYCODONE AND ACETAMINOPHEN 1 TABLET(S): 5; 325 TABLET ORAL at 22:54

## 2019-08-16 RX ADMIN — Medication 0.1 MILLIGRAM(S): at 22:24

## 2019-08-16 RX ADMIN — OXYCODONE AND ACETAMINOPHEN 1 TABLET(S): 5; 325 TABLET ORAL at 11:45

## 2019-08-16 RX ADMIN — NORTRIPTYLINE HYDROCHLORIDE 25 MILLIGRAM(S): 10 CAPSULE ORAL at 21:09

## 2019-08-16 RX ADMIN — OXYCODONE AND ACETAMINOPHEN 1 TABLET(S): 5; 325 TABLET ORAL at 16:43

## 2019-08-16 RX ADMIN — OXYCODONE AND ACETAMINOPHEN 1 TABLET(S): 5; 325 TABLET ORAL at 23:27

## 2019-08-16 RX ADMIN — Medication 650 MILLIGRAM(S): at 17:00

## 2019-08-16 RX ADMIN — Medication 15 MILLIGRAM(S): at 23:54

## 2019-08-16 RX ADMIN — Medication 15 MILLIGRAM(S): at 18:51

## 2019-08-16 RX ADMIN — Medication 650 MILLIGRAM(S): at 16:44

## 2019-08-16 RX ADMIN — Medication 6 MILLIGRAM(S): at 21:14

## 2019-08-16 RX ADMIN — NAFCILLIN 200 GRAM(S): 10 INJECTION, POWDER, FOR SOLUTION INTRAVENOUS at 02:26

## 2019-08-16 RX ADMIN — GABAPENTIN 600 MILLIGRAM(S): 400 CAPSULE ORAL at 21:17

## 2019-08-16 RX ADMIN — NAFCILLIN 200 GRAM(S): 10 INJECTION, POWDER, FOR SOLUTION INTRAVENOUS at 21:33

## 2019-08-16 RX ADMIN — Medication 650 MILLIGRAM(S): at 23:53

## 2019-08-16 RX ADMIN — Medication 15 MILLIGRAM(S): at 13:15

## 2019-08-16 RX ADMIN — Medication 15 MILLIGRAM(S): at 12:53

## 2019-08-16 RX ADMIN — Medication 15 MILLIGRAM(S): at 19:20

## 2019-08-16 RX ADMIN — OXYCODONE AND ACETAMINOPHEN 1 TABLET(S): 5; 325 TABLET ORAL at 00:20

## 2019-08-16 RX ADMIN — NAFCILLIN 200 GRAM(S): 10 INJECTION, POWDER, FOR SOLUTION INTRAVENOUS at 18:51

## 2019-08-16 RX ADMIN — OXYCODONE AND ACETAMINOPHEN 1 TABLET(S): 5; 325 TABLET ORAL at 06:00

## 2019-08-16 RX ADMIN — OXYCODONE AND ACETAMINOPHEN 1 TABLET(S): 5; 325 TABLET ORAL at 11:20

## 2019-08-16 RX ADMIN — TIZANIDINE 4 MILLIGRAM(S): 4 TABLET ORAL at 23:51

## 2019-08-16 RX ADMIN — NAFCILLIN 200 GRAM(S): 10 INJECTION, POWDER, FOR SOLUTION INTRAVENOUS at 12:57

## 2019-08-16 RX ADMIN — OXYCODONE AND ACETAMINOPHEN 1 TABLET(S): 5; 325 TABLET ORAL at 05:03

## 2019-08-16 RX ADMIN — OXYCODONE AND ACETAMINOPHEN 1 TABLET(S): 5; 325 TABLET ORAL at 17:15

## 2019-08-16 NOTE — PROGRESS NOTE ADULT - SUBJECTIVE AND OBJECTIVE BOX
[facility] [place] [facility]   MD JEYSON GALAN DO ANGELA WONG, PA    TEL:  OFFICE: [phone]  DR FORREST CELL: [phone]  DR. OCHOA CELL: [phone]  MARAL CHUN CELL: [phone]        Patient is a 54y old  Male who presents with a chief complaint of back pain and sciatica (16 Aug 2019 07:09)      Patient seen and examined at bedside. No chest pain/sob    VITALS:  T(F): 98.1 (08-16-19 @ 04:56), Max: 98.1 (08-16-19 @ 04:56)  HR: 64 (08-16-19 @ 04:56)  BP: 145/98 (08-16-19 @ 04:56)  RR: 18 (08-16-19 @ 04:56)  SpO2: 100% (08-16-19 @ 04:56)  Wt(kg): --    08-15 @ 07:01  -  08-16 @ 07:00  --------------------------------------------------------  IN: 0 mL / OUT: 300 mL / NET: -300 mL    08-16 @ 07:01  -  08-16 @ 12:25  --------------------------------------------------------  IN: 0 mL / OUT: 800 mL / NET: -800 mL          PHYSICAL EXAM:  Constitutional: NAD  Neck: No JVD  Respiratory: CTAB, no wheezes, rales or rhonchi  Cardiovascular: S1, S2, RRR  Gastrointestinal: BS+, soft, NT/ND  Extremities: No peripheral edema    Hospital Medications:   MEDICATIONS  (STANDING):  gabapentin 600 milliGRAM(s) Oral three times a day  lidocaine   Patch 1 Patch Transdermal daily  lisinopril 10 milliGRAM(s) Oral daily  melatonin 6 milliGRAM(s) Oral at bedtime  multivitamin      multivitamin 1 Tablet(s) Oral daily  nafcillin  IVPB      nafcillin  IVPB 2 Gram(s) IV Intermittent every 4 hours  nortriptyline 25 milliGRAM(s) Oral at bedtime  pantoprazole    Tablet 40 milliGRAM(s) Oral before breakfast  sodium chloride 0.9%. 1000 milliLiter(s) (75 mL/Hr) IV Continuous <Continuous>      LABS:  08-15    141  |  107  |  21  ----------------------------<  135<H>  4.4   |  23  |  0.90    Ca    8.8      15 Aug 2019 06:15  Phos  2.8     08-15  Mg     1.9     08-15      Creatinine Trend: 0.90 <--, 1.08 <--, 1.79 <--, 0.80 <--, 0.83 <--                                13.6   6.62  )-----------( 394      ( 15 Aug 2019 06:15 )             42.2     Urine Studies:  Urinalysis - [08-12-19 @ 21:20]      Color YELLOW / Appearance CLEAR / SG 1.033 / pH 5.5      Gluc NEGATIVE / Ketone NEGATIVE  / Bili NEGATIVE / Urobili NORMAL       Blood NEGATIVE / Protein 30 / Leuk Est NEGATIVE / Nitrite NEGATIVE      RBC 0-2 / WBC 3-5 / Hyaline NEGATIVE / Gran  / Sq Epi OCC / Non Sq Epi  / Bacteria NEGATIVE    Urine Creatinine 215.70      [08-12-19 @ 21:20]  Urine Protein 39.4      [08-12-19 @ 21:20]  Urine Sodium 31      [08-12-19 @ 21:20]      HCV 9.89, Reactive      [08-05-19 @ 05:25]  HIV Nonreactive The HIV Ag/Ab Combo test performed screens for HIV-1 p24  antigen, antibodies to HIV-1 (group M and group O), and  antibodies to HIV-2. All specimens repeatedly reactive  will reflex to an HIV 1/2 antibody confirmation and  differentiation test. This assay detects p24 antigen which  may be present prior to the development of HIV antibodies,  therefore a reactive result with a negative HIV 1/2 AB  Confirmation should be followed up with HIV-1 RNA, HIV-2  RNA and repeat testing in 4-8 weeks. A nonreactive result  does not preclude previous exposure to or infection with  HIV-1 or HIV-2. [facility] prohibits disclosure of this  result to any unauthorized party.      [08-10-19 @ 06:00]      RADIOLOGY & ADDITIONAL STUDIES:

## 2019-08-16 NOTE — PROGRESS NOTE ADULT - SUBJECTIVE AND OBJECTIVE BOX
Requested to consult on this patient for pain management.    HPI: [id number]  Handoff  MEWS Score  Sciatica  Sciatica  Acute right-sided low back pain with right-sided sciatica  Heroin dependence  Sciatica  Heroin dependence  Moderate protein-calorie malnutrition  Heroin dependence  Acute right-sided low back pain with right-sided sciatica  S/P ORIF (open reduction internal fixation) fracture  No significant past surgical history  No significant past surgical history  LOWER BACK AND LEG PAIN  Moderate protein-calorie malnutrition  Heroin dependence      acetaminophen   Tablet .. 650 milliGRAM(s) Oral every 6 hours  cloNIDine 0.1 milliGRAM(s) Oral two times a day PRN  gabapentin 600 milliGRAM(s) Oral three times a day  hydrOXYzine hydrochloride 50 milliGRAM(s) Oral every 8 hours PRN  ketorolac   Injectable 15 milliGRAM(s) IV Push every 6 hours  lidocaine   Patch 1 Patch Transdermal daily  lisinopril 10 milliGRAM(s) Oral daily  melatonin 6 milliGRAM(s) Oral at bedtime  multivitamin      multivitamin 1 Tablet(s) Oral daily  nafcillin  IVPB      nafcillin  IVPB 2 Gram(s) IV Intermittent every 4 hours  nortriptyline 25 milliGRAM(s) Oral at bedtime  oxyCODONE    5 mG/acetaminophen 325 mG 1 Tablet(s) Oral every 6 hours PRN  pantoprazole    Tablet 40 milliGRAM(s) Oral before breakfast  sodium chloride 0.9%. 1000 milliLiter(s) IV Continuous <Continuous>  sodium chloride 0.9%. 1000 milliLiter(s) IV Continuous <Continuous>  tiZANidine 4 milliGRAM(s) Oral every 6 hours      No Known Allergies      Chart Check (08-15-19 @ 20:20)  Provider to RN:       ok toadmnister pain medications now (08-16-19 @ 05:05)  Chart Check (08-16-19 @ 08:29)  ketorolac   Injectable: [Ordered as TORADOL Injectable]  15 milliGRAM(s), IV Push, every 6 hours, Stop After 2 Days  Special Instructions: standing x2 days per pain mgmt  Administration Instructions: IF IV PUSH, ADMINISTER OVER 1 MINUTE  Provider's Contact #: [phone] (08-16-19 @ 12:28)  acetaminophen   Tablet ..: [Known as TYLENOL..]  650 milliGRAM(s), Oral, every 6 hours, Stop After 2 Days  Special Instructions: Standing x2 days per pain service  Administration Instructions: MAX DAILY DOSE:  ADULT = 4,000 mG/Day (08-16-19 @ 12:28)  tiZANidine: [Known as ZANAFLEX]  4 milliGRAM(s), Oral, every 6 hours, Stop After 2 Days  Special Instructions: Hold for Sedation  Administration Instructions: This is a Look-alike/Sound-alike Medication  NON-FORMULARY REQUEST: back spasm as per pain service  Provider's Contact #: [phone] (08-16-19 @ 12:29)  tiZANidine: [Known as ZANAFLEX]  4 milliGRAM(s), Oral, every 6 hours, PRN for Muscle Spasm  Special Instructions: Hold for Sedation  Administration Instructions: This is a Look-alike/Sound-alike Medication  NON-FORMULARY REQUEST: as per pain service  Provider's Contact #: [phone] (08-16-19 @ 12:29)  sodium chloride 0.9%.: Solution, 1000 milliLiter(s) infuse at 75 mL/Hr, Stop After 12 Hours  Provider's Contact #: [phone] (08-16-19 @ 12:33)    Summary: Called by Team for reconsult on patient regarding pain management. Patient seen at bedside, lying in bed, complaining of 10/10 back pain.  Patient states its always 10/10.  Patient admits that he has a history of heroin abuse but needs to have his Percocet increased because its not enough.  In the past, he said that IV Toradol 30 mg also helps, but he had some issues with his kidneys.     PHYSICAL EXAM:  GENERAL: Alert & Oriented x 3 in NAD, well-groomed, well-developed.     Impression/Plan: Requested by Team to help manage pain.Patient was seen 08-16-19 @ 1320  Recommendations:   1) Consider IV Toradol 15 mg every 8 hours standing x2 days, then prn for pain.  2) Consider Acetaminophen 650 mg every 6 hours standing x2 days, then prn for pain.  3) Consider Tizanidine 4 mg every 6 Requested to consult on this patient for pain management.    HPI: [id number]  Handoff  MEWS Score  Sciatica  Sciatica  Acute right-sided low back pain with right-sided sciatica  Heroin dependence  Sciatica  Heroin dependence  Moderate protein-calorie malnutrition  Heroin dependence  Acute right-sided low back pain with right-sided sciatica  S/P ORIF (open reduction internal fixation) fracture  No significant past surgical history  No significant past surgical history  LOWER BACK AND LEG PAIN  Moderate protein-calorie malnutrition  Heroin dependence      acetaminophen   Tablet .. 650 milliGRAM(s) Oral every 6 hours  cloNIDine 0.1 milliGRAM(s) Oral two times a day PRN  gabapentin 600 milliGRAM(s) Oral three times a day  hydrOXYzine hydrochloride 50 milliGRAM(s) Oral every 8 hours PRN  ketorolac   Injectable 15 milliGRAM(s) IV Push every 6 hours  lidocaine   Patch 1 Patch Transdermal daily  lisinopril 10 milliGRAM(s) Oral daily  melatonin 6 milliGRAM(s) Oral at bedtime  multivitamin      multivitamin 1 Tablet(s) Oral daily  nafcillin  IVPB      nafcillin  IVPB 2 Gram(s) IV Intermittent every 4 hours  nortriptyline 25 milliGRAM(s) Oral at bedtime  oxyCODONE    5 mG/acetaminophen 325 mG 1 Tablet(s) Oral every 6 hours PRN  pantoprazole    Tablet 40 milliGRAM(s) Oral before breakfast  sodium chloride 0.9%. 1000 milliLiter(s) IV Continuous <Continuous>  sodium chloride 0.9%. 1000 milliLiter(s) IV Continuous <Continuous>  tiZANidine 4 milliGRAM(s) Oral every 6 hours      No Known Allergies      Chart Check (08-15-19 @ 20:20)  Provider to RN:       ok toadmnister pain medications now (08-16-19 @ 05:05)  Chart Check (08-16-19 @ 08:29)  ketorolac   Injectable: [Ordered as TORADOL Injectable]  15 milliGRAM(s), IV Push, every 6 hours, Stop After 2 Days  Special Instructions: standing x2 days per pain mgmt  Administration Instructions: IF IV PUSH, ADMINISTER OVER 1 MINUTE  Provider's Contact #: [phone] (08-16-19 @ 12:28)  acetaminophen   Tablet ..: [Known as TYLENOL..]  650 milliGRAM(s), Oral, every 6 hours, Stop After 2 Days  Special Instructions: Standing x2 days per pain service  Administration Instructions: MAX DAILY DOSE:  ADULT = 4,000 mG/Day (08-16-19 @ 12:28)  tiZANidine: [Known as ZANAFLEX]  4 milliGRAM(s), Oral, every 6 hours, Stop After 2 Days  Special Instructions: Hold for Sedation  Administration Instructions: This is a Look-alike/Sound-alike Medication  NON-FORMULARY REQUEST: back spasm as per pain service  Provider's Contact #: [phone] (08-16-19 @ 12:29)  tiZANidine: [Known as ZANAFLEX]  4 milliGRAM(s), Oral, every 6 hours, PRN for Muscle Spasm  Special Instructions: Hold for Sedation  Administration Instructions: This is a Look-alike/Sound-alike Medication  NON-FORMULARY REQUEST: as per pain service  Provider's Contact #: [phone] (08-16-19 @ 12:29)  sodium chloride 0.9%.: Solution, 1000 milliLiter(s) infuse at 75 mL/Hr, Stop After 12 Hours  Provider's Contact #: [phone] (08-16-19 @ 12:33)    Summary: Called by Team for reconsult on patient regarding pain management. Patient seen at bedside, lying in bed, complaining of 10/10 back pain.  Patient states its always 10/10.  Patient admits that he has a history of heroin abuse but needs to have his Percocet increased because its not enough.  In the past, he said that IV Toradol 30 mg also helps, but he had some issues with his kidneys.     PHYSICAL EXAM:  GENERAL: Alert & Oriented x 3 in NAD, well-groomed, well-developed.     Impression/Plan: Requested by Team to help manage pain.Patient was seen 08-16-19 @ 1320  Recommendations:   1) Consider IV Toradol 15 mg every 8 hours standing x2 days, then prn for pain.  2) Consider Acetaminophen 650 mg every 6 hours standing x2 days, then prn for pain.  3) Consider Tizanidine 4 mg every 6 hours standing x2 days, then prn for pain.  4) Continue Percocet as ordered.  If pain still persists, may consider increasing Neurontin to 800mg every 8 hours, however must monitor if patient becomes overly sedated, must discontinue.  Also patient will have increase chance for falls.  Discussed patient with Dr. LAYLA Moore, chronic pain doctor on call and agrees with the above recommendations. Requested to consult on this patient for pain management.    HPI: [id number]  Handoff  MEWS Score  Sciatica  Sciatica  Acute right-sided low back pain with right-sided sciatica  Heroin dependence  Sciatica  Heroin dependence  Moderate protein-calorie malnutrition  Heroin dependence  Acute right-sided low back pain with right-sided sciatica  S/P ORIF (open reduction internal fixation) fracture  No significant past surgical history  No significant past surgical history  LOWER BACK AND LEG PAIN  Moderate protein-calorie malnutrition  Heroin dependence      acetaminophen   Tablet .. 650 milliGRAM(s) Oral every 6 hours  cloNIDine 0.1 milliGRAM(s) Oral two times a day PRN  gabapentin 600 milliGRAM(s) Oral three times a day  hydrOXYzine hydrochloride 50 milliGRAM(s) Oral every 8 hours PRN  ketorolac   Injectable 15 milliGRAM(s) IV Push every 6 hours  lidocaine   Patch 1 Patch Transdermal daily  lisinopril 10 milliGRAM(s) Oral daily  melatonin 6 milliGRAM(s) Oral at bedtime  multivitamin      multivitamin 1 Tablet(s) Oral daily  nafcillin  IVPB      nafcillin  IVPB 2 Gram(s) IV Intermittent every 4 hours  nortriptyline 25 milliGRAM(s) Oral at bedtime  oxyCODONE    5 mG/acetaminophen 325 mG 1 Tablet(s) Oral every 6 hours PRN  pantoprazole    Tablet 40 milliGRAM(s) Oral before breakfast  sodium chloride 0.9%. 1000 milliLiter(s) IV Continuous <Continuous>  sodium chloride 0.9%. 1000 milliLiter(s) IV Continuous <Continuous>  tiZANidine 4 milliGRAM(s) Oral every 6 hours      No Known Allergies      Chart Check (08-15-19 @ 20:20)  Provider to RN:       ok toadmnister pain medications now (08-16-19 @ 05:05)  Chart Check (08-16-19 @ 08:29)  ketorolac   Injectable: [Ordered as TORADOL Injectable]  15 milliGRAM(s), IV Push, every 6 hours, Stop After 2 Days  Special Instructions: standing x2 days per pain mgmt  Administration Instructions: IF IV PUSH, ADMINISTER OVER 1 MINUTE  Provider's Contact #: [phone] (08-16-19 @ 12:28)  acetaminophen   Tablet ..: [Known as TYLENOL..]  650 milliGRAM(s), Oral, every 6 hours, Stop After 2 Days  Special Instructions: Standing x2 days per pain service  Administration Instructions: MAX DAILY DOSE:  ADULT = 4,000 mG/Day (08-16-19 @ 12:28)  tiZANidine: [Known as ZANAFLEX]  4 milliGRAM(s), Oral, every 6 hours, Stop After 2 Days  Special Instructions: Hold for Sedation  Administration Instructions: This is a Look-alike/Sound-alike Medication  NON-FORMULARY REQUEST: back spasm as per pain service  Provider's Contact #: [phone] (08-16-19 @ 12:29)  tiZANidine: [Known as ZANAFLEX]  4 milliGRAM(s), Oral, every 6 hours, PRN for Muscle Spasm  Special Instructions: Hold for Sedation  Administration Instructions: This is a Look-alike/Sound-alike Medication  NON-FORMULARY REQUEST: as per pain service  Provider's Contact #: [phone] (08-16-19 @ 12:29)  sodium chloride 0.9%.: Solution, 1000 milliLiter(s) infuse at 75 mL/Hr, Stop After 12 Hours  Provider's Contact #: [phone] (08-16-19 @ 12:33)    Summary: Called by Team for reconsult on patient regarding pain management. Patient seen at bedside, lying in bed, complaining of 10/10 back pain.  Patient states its always 10/10.  Patient admits that he has a history of heroin abuse but needs to have his Percocet increased because its not enough.  In the past, he said that IV Toradol 30 mg also helps, but he had some issues with his kidneys.     PHYSICAL EXAM:  GENERAL: Alert & Oriented x 3 in NAD, well-groomed, well-developed.     Impression/Plan: Requested by Team to help manage pain.Patient was seen 08-16-19 @ 1320  Recommendations:   1) Consider IV Toradol 15 mg every 8 hours standing x2 days, then prn for pain.  2) Consider Acetaminophen 650 mg every 6 hours standing x2 days, then prn for pain.  3) Consider Tizanidine 4 mg every 6 hours standing x2 days, then prn for pain.  Hold for oversedation.  4) Continue Percocet as ordered.  If pain still persists, may consider increasing Neurontin to 800mg every 8 hours, however must monitor if patient becomes overly sedated, must discontinue.  Also patient will have increase chance for falls.  Discussed patient with Dr. LAYLA Moore, chronic pain doctor on call and agrees with the above recommendations.

## 2019-08-16 NOTE — PROGRESS NOTE ADULT - SUBJECTIVE AND OBJECTIVE BOX
Follow Up:  MSSA bacteremia    Interval History: pt stable and afebrile, the repeat cx 8/12 negative for 24, TTE negative and repeat MRI still with enhancement on L3 which could be post traumatic of infection    ROS:      All other systems negative    Constitutional: no fever, no chills, no weight loss, no night sweats  Eye: no eye pain, no redness, no vision changes  ENT:  no sore throat, no rhinorrhea  Cardiovascular:  no chest pain, no palpitation  Respiratory:  no SOB, no cough  GI:  no abd pain, no vomiting, no diarrhea  urinary: no dysuria, no hematuria, no flank pain  : no penile discharge or bleeding  musculoskeletal:  severe back pain, radiating to RLE not controlled  skin:  no rash  neurology:  no headache, no seizure, no change in mental status          Allergies  No Known Allergies        ANTIMICROBIALS:  nafcillin  IVPB    nafcillin  IVPB 2 every 4 hours      OTHER MEDS:  acetaminophen   Tablet .. 650 milliGRAM(s) Oral every 6 hours  cloNIDine 0.1 milliGRAM(s) Oral two times a day PRN  gabapentin 600 milliGRAM(s) Oral three times a day  hydrOXYzine hydrochloride 50 milliGRAM(s) Oral every 8 hours PRN  ketorolac   Injectable 15 milliGRAM(s) IV Push every 6 hours  lidocaine   Patch 1 Patch Transdermal daily  lisinopril 10 milliGRAM(s) Oral daily  melatonin 6 milliGRAM(s) Oral at bedtime  multivitamin      multivitamin 1 Tablet(s) Oral daily  nortriptyline 25 milliGRAM(s) Oral at bedtime  oxyCODONE    5 mG/acetaminophen 325 mG 1 Tablet(s) Oral every 6 hours PRN  pantoprazole    Tablet 40 milliGRAM(s) Oral before breakfast  sodium chloride 0.9%. 1000 milliLiter(s) IV Continuous <Continuous>  sodium chloride 0.9%. 1000 milliLiter(s) IV Continuous <Continuous>  tiZANidine 4 milliGRAM(s) Oral every 6 hours      Vital Signs Last 24 Hrs  T(C): 36.7 (16 Aug 2019 04:56), Max: 36.7 (16 Aug 2019 04:56)  T(F): 98.1 (16 Aug 2019 04:56), Max: 98.1 (16 Aug 2019 04:56)  HR: 64 (16 Aug 2019 04:56) (58 - 64)  BP: 145/98 (16 Aug 2019 04:56) (127/92 - 145/98)  BP(mean): --  RR: 18 (16 Aug 2019 04:56) (17 - 18)  SpO2: 100% (16 Aug 2019 04:56) (99% - 100%)    Physical Exam:  General:    NAD, non toxic  Head: atraumatic, normocephalic  Eyes: normal sclera and conjunctiva  ENT:   no oropharyngeal lesions, no LAD, neck supple  Cardio:    regular S1,S2  Respiratory:   clear b/l, no wheezing  abd:   soft, BS +, not tender, no hepatosplenomegaly  :     no CVAT, no suprapubic tenderness, no romero  Musculoskeletal : RLE scar from ORIF but no tenderness  Skin:    no rash  vascular: no phlebitis, normal pulses  Neurologic:     no focal deficits  psych: normal affect                        13.6   6.62  )-----------( 394      ( 15 Aug 2019 06:15 )             42.2       08-15    141  |  107  |  21  ----------------------------<  135<H>  4.4   |  23  |  0.90    Ca    8.8      15 Aug 2019 06:15  Phos  2.8     08-15  Mg     1.9     08-15            MICROBIOLOGY:  v  BLOOD PERIPHERAL  08-12-19 --  --  --      BLOOD VENOUS  08-10-19 --  --  --      BLOOD  08-08-19 --  --  --      BLOOD  08-07-19 --  --  Staphylococcus aureus  BLOOD CULTURE PCR                RADIOLOGY:  Images below reviewed personally  < from: MR Lumbar Spine w/wo IV Cont (08.14.19 @ 23:21) >  Impression: Abnormal T1 and T2 prolongation with associated enhancement   is seen involving the L3 vertebral body as described above.

## 2019-08-16 NOTE — PROGRESS NOTE ADULT - SUBJECTIVE AND OBJECTIVE BOX
MARCIO LUZ  54y  Male      Patient is a 54y old  Male who presents with a chief complaint of back pain and sciatica (16 Aug 2019 16:36)  c/o mod-severe back pain.seen by pain management.no fever,no sob,no cp    REVIEW OF SYSTEMS:  as above      INTERVAL HPI/OVERNIGHT EVENTS:  T(C): 36.8 (08-16-19 @ 19:45), Max: 36.8 (08-16-19 @ 19:45)  HR: 69 (08-16-19 @ 19:45) (64 - 69)  BP: 150/103 (08-16-19 @ 19:45) (145/98 - 150/103)  RR: 18 (08-16-19 @ 19:45) (18 - 18)  SpO2: 100% (08-16-19 @ 19:45) (100% - 100%)  Wt(kg): --  I&O's Summary    15 Aug 2019 07:01  -  16 Aug 2019 07:00  --------------------------------------------------------  IN: 0 mL / OUT: 300 mL / NET: -300 mL    16 Aug 2019 07:01  -  16 Aug 2019 22:41  --------------------------------------------------------  IN: 0 mL / OUT: 1100 mL / NET: -1100 mL      T(C): 36.8 (08-16-19 @ 19:45), Max: 36.8 (08-16-19 @ 19:45)  HR: 69 (08-16-19 @ 19:45) (64 - 69)  BP: 150/103 (08-16-19 @ 19:45) (145/98 - 150/103)  RR: 18 (08-16-19 @ 19:45) (18 - 18)  SpO2: 100% (08-16-19 @ 19:45) (100% - 100%)  Wt(kg): --Vital Signs Last 24 Hrs  T(C): 36.8 (16 Aug 2019 19:45), Max: 36.8 (16 Aug 2019 19:45)  T(F): 98.2 (16 Aug 2019 19:45), Max: 98.2 (16 Aug 2019 19:45)  HR: 69 (16 Aug 2019 19:45) (64 - 69)  BP: 150/103 (16 Aug 2019 19:45) (145/98 - 150/103)  BP(mean): --  RR: 18 (16 Aug 2019 19:45) (18 - 18)  SpO2: 100% (16 Aug 2019 19:45) (100% - 100%)    LABS:                        13.6   6.62  )-----------( 394      ( 15 Aug 2019 06:15 )             42.2     08-15    141  |  107  |  21  ----------------------------<  135<H>  4.4   |  23  |  0.90    Ca    8.8      15 Aug 2019 06:15  Phos  2.8     08-15  Mg     1.9     08-15          CAPILLARY BLOOD GLUCOSE                PAST MEDICAL & SURGICAL HISTORY:  Sciatica  S/P ORIF (open reduction internal fixation) fracture      MEDICATIONS  (STANDING):  acetaminophen   Tablet .. 650 milliGRAM(s) Oral every 6 hours  gabapentin 600 milliGRAM(s) Oral three times a day  ketorolac   Injectable 15 milliGRAM(s) IV Push every 6 hours  lidocaine   Patch 1 Patch Transdermal daily  lisinopril 10 milliGRAM(s) Oral daily  melatonin 6 milliGRAM(s) Oral at bedtime  multivitamin      multivitamin 1 Tablet(s) Oral daily  nafcillin  IVPB      nafcillin  IVPB 2 Gram(s) IV Intermittent every 4 hours  nortriptyline 25 milliGRAM(s) Oral at bedtime  pantoprazole    Tablet 40 milliGRAM(s) Oral before breakfast  sodium chloride 0.9%. 1000 milliLiter(s) (75 mL/Hr) IV Continuous <Continuous>  sodium chloride 0.9%. 1000 milliLiter(s) (75 mL/Hr) IV Continuous <Continuous>  tiZANidine 4 milliGRAM(s) Oral every 6 hours    MEDICATIONS  (PRN):  cloNIDine 0.1 milliGRAM(s) Oral two times a day PRN withdrawl symptoms  hydrOXYzine hydrochloride 50 milliGRAM(s) Oral every 8 hours PRN Anxiety  oxyCODONE    5 mG/acetaminophen 325 mG 1 Tablet(s) Oral every 6 hours PRN Severe Pain (7 - 10)        RADIOLOGY & ADDITIONAL TESTS:    Imaging Personally Reviewed:  [ ] YES  [ ] NO    Consultant(s) Notes Reviewed:  [ ] YES  [ ] NO    PHYSICAL EXAM:  GENERAL: NAD, well-groomed, well-developed  HEAD:  Atraumatic, Normocephalic  EYES: EOMI, PERRLA, conjunctiva and sclera clear  ENMT: No tonsillar erythema, exudates, or enlargement; Moist mucous membranes, Good dentition, No lesions  NECK: Supple, No JVD, Normal thyroid  NERVOUS SYSTEM:  Alert & Oriented X3, Good concentration; Motor Strength 5/5 B/L upper and lower extremities; DTRs 2+ intact and symmetric  CHEST/LUNG: Clear to percussion bilaterally; No rales, rhonchi, wheezing, or rubs  HEART: Regular rate and rhythm; No murmurs, rubs, or gallops  ABDOMEN: Soft, Nontender, Nondistended; Bowel sounds present  EXTREMITIES:  2+ Peripheral Pulses, No clubbing, cyanosis, or edema.Back-mild tenderness lower back  LYMPH: No lymphadenopathy noted  SKIN: No rashes or lesions    Care Discussed with Consultants/Other Providers [ ] YES  [ ] NO      Code Status: [] Full Code [] DNR [] DNI [] Goals of Care:   Disposition: [] ICU [] Stroke Unit [] RCU []PCU []Floor [] Discharge Home         JEANINE RodriguezP

## 2019-08-16 NOTE — PROVIDER CONTACT NOTE (OTHER) - ACTION/TREATMENT ORDERED:
NP Sola Jacobson made aware of pt's BP and PT's refusal of 0600 lisinopril, also Pt states he is open to other anti-hypertensive medications but not lisinopril. Pt states he takes enalapril ar home.

## 2019-08-16 NOTE — PROGRESS NOTE ADULT - SUBJECTIVE AND OBJECTIVE BOX
LUZ BUCKLEY:[id number],   54yMale followed for:  No Known Allergies    PAST MEDICAL & SURGICAL HISTORY:  Sciatica  S/P ORIF (open reduction internal fixation) fracture    FAMILY HISTORY:    MEDICATIONS  (STANDING):  gabapentin 600 milliGRAM(s) Oral three times a day  lidocaine   Patch 1 Patch Transdermal daily  lisinopril 10 milliGRAM(s) Oral daily  melatonin 6 milliGRAM(s) Oral at bedtime  multivitamin      multivitamin 1 Tablet(s) Oral daily  nafcillin  IVPB      nafcillin  IVPB 2 Gram(s) IV Intermittent every 4 hours  nortriptyline 25 milliGRAM(s) Oral at bedtime  pantoprazole    Tablet 40 milliGRAM(s) Oral before breakfast  sodium chloride 0.9%. 1000 milliLiter(s) (75 mL/Hr) IV Continuous <Continuous>    MEDICATIONS  (PRN):  cloNIDine 0.1 milliGRAM(s) Oral two times a day PRN withdrawl symptoms  hydrOXYzine hydrochloride 50 milliGRAM(s) Oral every 8 hours PRN Anxiety  oxyCODONE    5 mG/acetaminophen 325 mG 1 Tablet(s) Oral every 6 hours PRN Severe Pain (7 - 10)  tiZANidine 4 milliGRAM(s) Oral every 6 hours PRN back pain, muscle spasms      Vital Signs Last 24 Hrs  T(C): 36.7 (16 Aug 2019 04:56), Max: 36.7 (15 Aug 2019 15:19)  T(F): 98.1 (16 Aug 2019 04:56), Max: 98.1 (16 Aug 2019 04:56)  HR: 64 (16 Aug 2019 04:56) (58 - 75)  BP: 145/98 (16 Aug 2019 04:56) (127/92 - 174/107)  BP(mean): --  RR: 18 (16 Aug 2019 04:56) (16 - 18)  SpO2: 100% (16 Aug 2019 04:56) (99% - 100%)  nc/at  s1s2  cta  soft, nt, nd no guarding or rebound  no c/c/e    CBC Full  -  ( 15 Aug 2019 06:15 )  WBC Count : 6.62 K/uL  RBC Count : 4.58 M/uL  Hemoglobin : 13.6 g/dL  Hematocrit : 42.2 %  Platelet Count - Automated : 394 K/uL  Mean Cell Volume : 92.1 fL  Mean Cell Hemoglobin : 29.7 pg  Mean Cell Hemoglobin Concentration : 32.2 %  Auto Neutrophil # : x  Auto Lymphocyte # : x  Auto Monocyte # : x  Auto Eosinophil # : x  Auto Basophil # : x  Auto Neutrophil % : x  Auto Lymphocyte % : x  Auto Monocyte % : x  Auto Eosinophil % : x  Auto Basophil % : x    08-15    141  |  107  |  21  ----------------------------<  135<H>  4.4   |  23  |  0.90    Ca    8.8      15 Aug 2019 06:15  Phos  2.8     08-15  Mg     1.9     08-15

## 2019-08-16 NOTE — PROGRESS NOTE ADULT - ASSESSMENT
54 m with IVDA, hepatitis C (not treated), RLE ORIF and hardware, previous bacteremia and lumbar osteo? 2004, presented with worsening back pain, with difficulty ambulating due to pain.  here afebrile normal WBC  blood cx: 2/2 MSSA  hep C viral load: [id number]  spine MRI: enhancing lesion on L3 s/o occult fx or a lesion but CT negative for FX or lesion  hiv negative    MSSA bacteremia and ?L3 osteo (read as occult fx or a lesion on MRI but CT negative for FX or lesion), pt also has ORIF on the LE but no symptoms there   active heroine use  repeat cx 8/12 negative   TTE negative  repeat MRI again with L3 enhancement which could be post traumatic or infection      * c/w Nafcillin 2 q 4  * pt can not be discharged with a line in view of active IVDA  * will have continue the course here or another rehab facility  * will do an 8 week course in view of L3 enhancement and worsened back pain when he was bacteremic

## 2019-08-16 NOTE — PROGRESS NOTE ADULT - ASSESSMENT
53 yo male active heroin use with chronic back pain, kidney cyst a/w acute on chronic back pain with sciatica with difficulty ambulating due to pain 8/4. patient found to have s. aureus bacteremia on cefazolin iv. nephrology consulted for ARNOLDO.     ARNOLDO  peaked 1.79   ARNOLDO likely prerenal on NSAIDs and lisinopril  renal function improved with IVF  off ibuprofen.   restarted on lisinopril 10 8/12 by team  avoid NSAIDS, nephrotoxic agents  monitor bmp, urine output     HTN  controlled  restarted on lisinopril by team  monitor bmp and bp    Hep C  f/u GI  outpatient treatment    Proteinuria  mild  p/c ratio <300, Monitor  hep c + 55 yo male active heroin use with chronic back pain, kidney cyst a/w acute on chronic back pain with sciatica with difficulty ambulating due to pain 8/4. patient found to have s. aureus bacteremia on cefazolin iv. nephrology consulted for ARNOLDO.     ARNOLDO  peaked 1.79   ARNOLDO likely prerenal on NSAIDs and lisinopril  renal function improved with IVF  off ibuprofen.   renal us refused by patient  restarted on lisinopril 10 8/12 by team  avoid NSAIDS, nephrotoxic agents  monitor bmp, urine output     HTN  controlled  restarted on lisinopril by team  monitor bmp and bp    Hep C  f/u GI  outpatient treatment    Proteinuria  mild  p/c ratio <300, Monitor  hep c +

## 2019-08-17 LAB
ANION GAP SERPL CALC-SCNC: 13 MMO/L — SIGNIFICANT CHANGE UP (ref 7–14)
BACTERIA BLD CULT: SIGNIFICANT CHANGE UP
BUN SERPL-MCNC: 20 MG/DL — SIGNIFICANT CHANGE UP (ref 7–23)
CALCIUM SERPL-MCNC: 9.7 MG/DL — SIGNIFICANT CHANGE UP (ref 8.4–10.5)
CHLORIDE SERPL-SCNC: 106 MMOL/L — SIGNIFICANT CHANGE UP (ref 98–107)
CO2 SERPL-SCNC: 20 MMOL/L — LOW (ref 22–31)
CREAT SERPL-MCNC: 0.91 MG/DL — SIGNIFICANT CHANGE UP (ref 0.5–1.3)
GLUCOSE SERPL-MCNC: 101 MG/DL — HIGH (ref 70–99)
HCT VFR BLD CALC: 46.4 % — SIGNIFICANT CHANGE UP (ref 39–50)
HGB BLD-MCNC: 14.9 G/DL — SIGNIFICANT CHANGE UP (ref 13–17)
MCHC RBC-ENTMCNC: 29.2 PG — SIGNIFICANT CHANGE UP (ref 27–34)
MCHC RBC-ENTMCNC: 32.1 % — SIGNIFICANT CHANGE UP (ref 32–36)
MCV RBC AUTO: 90.8 FL — SIGNIFICANT CHANGE UP (ref 80–100)
NRBC # FLD: 0 K/UL — SIGNIFICANT CHANGE UP (ref 0–0)
PLATELET # BLD AUTO: 442 K/UL — HIGH (ref 150–400)
PMV BLD: 9.6 FL — SIGNIFICANT CHANGE UP (ref 7–13)
POTASSIUM SERPL-MCNC: 4.6 MMOL/L — SIGNIFICANT CHANGE UP (ref 3.5–5.3)
POTASSIUM SERPL-SCNC: 4.6 MMOL/L — SIGNIFICANT CHANGE UP (ref 3.5–5.3)
RBC # BLD: 5.11 M/UL — SIGNIFICANT CHANGE UP (ref 4.2–5.8)
RBC # FLD: 13.9 % — SIGNIFICANT CHANGE UP (ref 10.3–14.5)
SODIUM SERPL-SCNC: 139 MMOL/L — SIGNIFICANT CHANGE UP (ref 135–145)
WBC # BLD: 7.89 K/UL — SIGNIFICANT CHANGE UP (ref 3.8–10.5)
WBC # FLD AUTO: 7.89 K/UL — SIGNIFICANT CHANGE UP (ref 3.8–10.5)

## 2019-08-17 RX ORDER — KETOROLAC TROMETHAMINE 30 MG/ML
15 SYRINGE (ML) INJECTION ONCE
Refills: 0 | Status: DISCONTINUED | OUTPATIENT
Start: 2019-08-17 | End: 2019-08-17

## 2019-08-17 RX ORDER — OXYCODONE HYDROCHLORIDE 5 MG/1
10 TABLET ORAL ONCE
Refills: 0 | Status: DISCONTINUED | OUTPATIENT
Start: 2019-08-17 | End: 2019-08-17

## 2019-08-17 RX ORDER — KETOROLAC TROMETHAMINE 30 MG/ML
30 SYRINGE (ML) INJECTION ONCE
Refills: 0 | Status: DISCONTINUED | OUTPATIENT
Start: 2019-08-17 | End: 2019-08-17

## 2019-08-17 RX ORDER — OXYCODONE AND ACETAMINOPHEN 5; 325 MG/1; MG/1
2 TABLET ORAL EVERY 6 HOURS
Refills: 0 | Status: DISCONTINUED | OUTPATIENT
Start: 2019-08-17 | End: 2019-08-22

## 2019-08-17 RX ADMIN — OXYCODONE AND ACETAMINOPHEN 1 TABLET(S): 5; 325 TABLET ORAL at 06:00

## 2019-08-17 RX ADMIN — Medication 30 MILLIGRAM(S): at 20:08

## 2019-08-17 RX ADMIN — Medication 30 MILLIGRAM(S): at 20:00

## 2019-08-17 RX ADMIN — NORTRIPTYLINE HYDROCHLORIDE 25 MILLIGRAM(S): 10 CAPSULE ORAL at 23:22

## 2019-08-17 RX ADMIN — Medication 650 MILLIGRAM(S): at 19:53

## 2019-08-17 RX ADMIN — NAFCILLIN 200 GRAM(S): 10 INJECTION, POWDER, FOR SOLUTION INTRAVENOUS at 20:00

## 2019-08-17 RX ADMIN — GABAPENTIN 600 MILLIGRAM(S): 400 CAPSULE ORAL at 14:04

## 2019-08-17 RX ADMIN — TIZANIDINE 4 MILLIGRAM(S): 4 TABLET ORAL at 17:23

## 2019-08-17 RX ADMIN — TIZANIDINE 4 MILLIGRAM(S): 4 TABLET ORAL at 23:22

## 2019-08-17 RX ADMIN — Medication 15 MILLIGRAM(S): at 07:35

## 2019-08-17 RX ADMIN — NAFCILLIN 200 GRAM(S): 10 INJECTION, POWDER, FOR SOLUTION INTRAVENOUS at 06:46

## 2019-08-17 RX ADMIN — OXYCODONE HYDROCHLORIDE 10 MILLIGRAM(S): 5 TABLET ORAL at 23:22

## 2019-08-17 RX ADMIN — OXYCODONE AND ACETAMINOPHEN 1 TABLET(S): 5; 325 TABLET ORAL at 17:45

## 2019-08-17 RX ADMIN — Medication 15 MILLIGRAM(S): at 00:45

## 2019-08-17 RX ADMIN — TIZANIDINE 4 MILLIGRAM(S): 4 TABLET ORAL at 11:16

## 2019-08-17 RX ADMIN — OXYCODONE AND ACETAMINOPHEN 1 TABLET(S): 5; 325 TABLET ORAL at 17:19

## 2019-08-17 RX ADMIN — Medication 20 MILLIGRAM(S): at 14:25

## 2019-08-17 RX ADMIN — OXYCODONE AND ACETAMINOPHEN 1 TABLET(S): 5; 325 TABLET ORAL at 11:16

## 2019-08-17 RX ADMIN — Medication 650 MILLIGRAM(S): at 00:45

## 2019-08-17 RX ADMIN — Medication 650 MILLIGRAM(S): at 06:46

## 2019-08-17 RX ADMIN — Medication 15 MILLIGRAM(S): at 06:46

## 2019-08-17 RX ADMIN — OXYCODONE AND ACETAMINOPHEN 1 TABLET(S): 5; 325 TABLET ORAL at 12:51

## 2019-08-17 RX ADMIN — Medication 650 MILLIGRAM(S): at 14:03

## 2019-08-17 RX ADMIN — Medication 15 MILLIGRAM(S): at 14:24

## 2019-08-17 RX ADMIN — NAFCILLIN 200 GRAM(S): 10 INJECTION, POWDER, FOR SOLUTION INTRAVENOUS at 09:56

## 2019-08-17 RX ADMIN — Medication 6 MILLIGRAM(S): at 23:22

## 2019-08-17 RX ADMIN — GABAPENTIN 600 MILLIGRAM(S): 400 CAPSULE ORAL at 05:12

## 2019-08-17 RX ADMIN — Medication 650 MILLIGRAM(S): at 07:35

## 2019-08-17 RX ADMIN — NAFCILLIN 200 GRAM(S): 10 INJECTION, POWDER, FOR SOLUTION INTRAVENOUS at 16:17

## 2019-08-17 RX ADMIN — Medication 1 TABLET(S): at 11:16

## 2019-08-17 RX ADMIN — TIZANIDINE 4 MILLIGRAM(S): 4 TABLET ORAL at 05:11

## 2019-08-17 RX ADMIN — NAFCILLIN 200 GRAM(S): 10 INJECTION, POWDER, FOR SOLUTION INTRAVENOUS at 01:55

## 2019-08-17 RX ADMIN — Medication 0.1 MILLIGRAM(S): at 19:53

## 2019-08-17 RX ADMIN — OXYCODONE AND ACETAMINOPHEN 1 TABLET(S): 5; 325 TABLET ORAL at 05:16

## 2019-08-17 NOTE — PROVIDER CONTACT NOTE (OTHER) - ASSESSMENT
Pt c/o headache, sensitivity to bright lights  Denies SOB, chest pain Pt c/o headache, sensitivity to bright lights, Dizziness  Denies SOB, chest pain

## 2019-08-17 NOTE — PROVIDER CONTACT NOTE (OTHER) - ASSESSMENT
Patient continues to c/o headache.  Reports pain in head and back.  Dr. Rodriguez at bedside speaking with patient.

## 2019-08-17 NOTE — PROGRESS NOTE ADULT - SUBJECTIVE AND OBJECTIVE BOX
MARCIO LUZ  54y  Male      Patient is a 54y old  Male who presents with a chief complaint of back pain and sciatica (17 Aug 2019 10:25)  Patient c/o mod-severe back pain radiating to thigh?.elevated BP,NO CP,NO SOB,NO FEVER,NO COUGH,APPEARS ANXIOUS    REVIEW OF SYSTEMS:  as above        INTERVAL HPI/OVERNIGHT EVENTS:  T(C): 37.1 (08-17-19 @ 21:20), Max: 37.1 (08-17-19 @ 21:20)  HR: 73 (08-17-19 @ 21:20) (66 - 85)  BP: 149/108 (08-17-19 @ 21:20) (127/101 - 149/108)  RR: 19 (08-17-19 @ 21:20) (18 - 19)  SpO2: 100% (08-17-19 @ 21:20) (99% - 100%)  Wt(kg): --  I&O's Summary    16 Aug 2019 07:01  -  17 Aug 2019 07:00  --------------------------------------------------------  IN: 0 mL / OUT: 1100 mL / NET: -1100 mL    17 Aug 2019 07:01  -  17 Aug 2019 21:46  --------------------------------------------------------  IN: 800 mL / OUT: 700 mL / NET: 100 mL      T(C): 37.1 (08-17-19 @ 21:20), Max: 37.1 (08-17-19 @ 21:20)  HR: 73 (08-17-19 @ 21:20) (66 - 85)  BP: 149/108 (08-17-19 @ 21:20) (127/101 - 149/108)  RR: 19 (08-17-19 @ 21:20) (18 - 19)  SpO2: 100% (08-17-19 @ 21:20) (99% - 100%)  Wt(kg): --Vital Signs Last 24 Hrs  T(C): 37.1 (17 Aug 2019 21:20), Max: 37.1 (17 Aug 2019 21:20)  T(F): 98.7 (17 Aug 2019 21:20), Max: 98.7 (17 Aug 2019 21:20)  HR: 73 (17 Aug 2019 21:20) (66 - 85)  BP: 149/108 (17 Aug 2019 21:20) (127/101 - 149/108)  BP(mean): --  RR: 19 (17 Aug 2019 21:20) (18 - 19)  SpO2: 100% (17 Aug 2019 21:20) (99% - 100%)    LABS:                        14.9   7.89  )-----------( 442      ( 17 Aug 2019 05:10 )             46.4     08-17    139  |  106  |  20  ----------------------------<  101<H>  4.6   |  20<L>  |  0.91    Ca    9.7      17 Aug 2019 05:10          CAPILLARY BLOOD GLUCOSE                PAST MEDICAL & SURGICAL HISTORY:  Sciatica  S/P ORIF (open reduction internal fixation) fracture      MEDICATIONS  (STANDING):  acetaminophen 325 mG/butalbital 50 mG/caffeine 40 mG 1 Tablet(s) Oral once  enalapril 20 milliGRAM(s) Oral daily  gabapentin 600 milliGRAM(s) Oral three times a day  ketorolac   Injectable 15 milliGRAM(s) IV Push every 6 hours  lidocaine   Patch 1 Patch Transdermal daily  melatonin 6 milliGRAM(s) Oral at bedtime  multivitamin      multivitamin 1 Tablet(s) Oral daily  nafcillin  IVPB      nafcillin  IVPB 2 Gram(s) IV Intermittent every 4 hours  nortriptyline 25 milliGRAM(s) Oral at bedtime  pantoprazole    Tablet 40 milliGRAM(s) Oral before breakfast  sodium chloride 0.9%. 1000 milliLiter(s) (75 mL/Hr) IV Continuous <Continuous>  sodium chloride 0.9%. 1000 milliLiter(s) (75 mL/Hr) IV Continuous <Continuous>  tiZANidine 4 milliGRAM(s) Oral every 6 hours    MEDICATIONS  (PRN):  cloNIDine 0.1 milliGRAM(s) Oral two times a day PRN withdrawl symptoms  hydrOXYzine hydrochloride 50 milliGRAM(s) Oral every 8 hours PRN Anxiety  oxyCODONE    5 mG/acetaminophen 325 mG 2 Tablet(s) Oral every 6 hours PRN Severe Pain (7 - 10)        RADIOLOGY & ADDITIONAL TESTS:    Imaging Personally Reviewed:  [ ] YES  [ ] NO    Consultant(s) Notes Reviewed:  [x ] YES  [ ] NO    PHYSICAL EXAM:  GENERAL: NAD, well-groomed, well-developed  HEAD:  Atraumatic, Normocephalic  EYES: EOMI, PERRLA, conjunctiva and sclera clear  ENMT: No tonsillar erythema, exudates, or enlargement; Moist mucous membranes, Good dentition, No lesions  NECK: Supple, No JVD, Normal thyroid  NERVOUS SYSTEM:  Alert & Oriented X3, Good concentration; Motor Strength 5/5 B/L upper and lower extremities; DTRs 2+ intact and symmetric  CHEST/LUNG: Clear to percussion bilaterally; No rales, rhonchi, wheezing, or rubs  HEART: Regular rate and rhythm; No murmurs, rubs, or gallops  ABDOMEN: Soft, Nontender, Nondistended; Bowel sounds present  EXTREMITIES:  2+ Peripheral Pulses, No clubbing, cyanosis, or edema  LYMPH: No lymphadenopathy noted  SKIN: No rashes or lesions    Care Discussed with Consultants/Other Providers [ ] YES  [ ] NO      Code Status: [] Full Code [] DNR [] DNI [] Goals of Care:   Disposition: [] ICU [] Stroke Unit [] RCU []PCU []Floor [] Discharge Home         JEANINE RodriguezP

## 2019-08-17 NOTE — PROVIDER CONTACT NOTE (OTHER) - ASSESSMENT
Pt is alert and oriented x4, pt states that when he takes lisinopril he gets terrible headache. Clonidine 0.1 mg given overnight with SBP improvement and DBP remains >100. Pt denies chest pain and sob

## 2019-08-17 NOTE — PROGRESS NOTE ADULT - SUBJECTIVE AND OBJECTIVE BOX
LUZ BUCKLEY:[id number],   54yMale followed for:  No Known Allergies    PAST MEDICAL & SURGICAL HISTORY:  Sciatica  S/P ORIF (open reduction internal fixation) fracture    FAMILY HISTORY:    MEDICATIONS  (STANDING):  acetaminophen   Tablet .. 650 milliGRAM(s) Oral every 6 hours  gabapentin 600 milliGRAM(s) Oral three times a day  ketorolac   Injectable 15 milliGRAM(s) IV Push every 6 hours  lidocaine   Patch 1 Patch Transdermal daily  lisinopril 10 milliGRAM(s) Oral daily  melatonin 6 milliGRAM(s) Oral at bedtime  multivitamin      multivitamin 1 Tablet(s) Oral daily  nafcillin  IVPB      nafcillin  IVPB 2 Gram(s) IV Intermittent every 4 hours  nortriptyline 25 milliGRAM(s) Oral at bedtime  pantoprazole    Tablet 40 milliGRAM(s) Oral before breakfast  sodium chloride 0.9%. 1000 milliLiter(s) (75 mL/Hr) IV Continuous <Continuous>  sodium chloride 0.9%. 1000 milliLiter(s) (75 mL/Hr) IV Continuous <Continuous>  tiZANidine 4 milliGRAM(s) Oral every 6 hours    MEDICATIONS  (PRN):  cloNIDine 0.1 milliGRAM(s) Oral two times a day PRN withdrawl symptoms  hydrOXYzine hydrochloride 50 milliGRAM(s) Oral every 8 hours PRN Anxiety  oxyCODONE    5 mG/acetaminophen 325 mG 1 Tablet(s) Oral every 6 hours PRN Severe Pain (7 - 10)      Vital Signs Last 24 Hrs  T(C): 37 (17 Aug 2019 05:21), Max: 37 (17 Aug 2019 05:21)  T(F): 98.6 (17 Aug 2019 05:21), Max: 98.6 (17 Aug 2019 05:21)  HR: 70 (17 Aug 2019 05:21) (66 - 70)  BP: 127/101 (17 Aug 2019 05:21) (127/101 - 150/103)  BP(mean): --  RR: 18 (17 Aug 2019 05:21) (18 - 18)  SpO2: 99% (17 Aug 2019 05:21) (99% - 100%)  nc/at  s1s2  cta  soft, nt, nd no guarding or rebound  no c/c/e    CBC Full  -  ( 17 Aug 2019 05:10 )  WBC Count : 7.89 K/uL  RBC Count : 5.11 M/uL  Hemoglobin : 14.9 g/dL  Hematocrit : 46.4 %  Platelet Count - Automated : 442 K/uL  Mean Cell Volume : 90.8 fL  Mean Cell Hemoglobin : 29.2 pg  Mean Cell Hemoglobin Concentration : 32.1 %  Auto Neutrophil # : x  Auto Lymphocyte # : x  Auto Monocyte # : x  Auto Eosinophil # : x  Auto Basophil # : x  Auto Neutrophil % : x  Auto Lymphocyte % : x  Auto Monocyte % : x  Auto Eosinophil % : x  Auto Basophil % : x    08-17    139  |  106  |  20  ----------------------------<  101<H>  4.6   |  20<L>  |  0.91    Ca    9.7      17 Aug 2019 05:10

## 2019-08-18 LAB
ANION GAP SERPL CALC-SCNC: 11 MMO/L — SIGNIFICANT CHANGE UP (ref 7–14)
BUN SERPL-MCNC: 20 MG/DL — SIGNIFICANT CHANGE UP (ref 7–23)
CALCIUM SERPL-MCNC: 8.9 MG/DL — SIGNIFICANT CHANGE UP (ref 8.4–10.5)
CHLORIDE SERPL-SCNC: 105 MMOL/L — SIGNIFICANT CHANGE UP (ref 98–107)
CO2 SERPL-SCNC: 24 MMOL/L — SIGNIFICANT CHANGE UP (ref 22–31)
CREAT SERPL-MCNC: 1.05 MG/DL — SIGNIFICANT CHANGE UP (ref 0.5–1.3)
GLUCOSE SERPL-MCNC: 118 MG/DL — HIGH (ref 70–99)
HCT VFR BLD CALC: 42.9 % — SIGNIFICANT CHANGE UP (ref 39–50)
HGB BLD-MCNC: 14.1 G/DL — SIGNIFICANT CHANGE UP (ref 13–17)
MAGNESIUM SERPL-MCNC: 1.9 MG/DL — SIGNIFICANT CHANGE UP (ref 1.6–2.6)
MCHC RBC-ENTMCNC: 29.5 PG — SIGNIFICANT CHANGE UP (ref 27–34)
MCHC RBC-ENTMCNC: 32.9 % — SIGNIFICANT CHANGE UP (ref 32–36)
MCV RBC AUTO: 89.7 FL — SIGNIFICANT CHANGE UP (ref 80–100)
NRBC # FLD: 0 K/UL — SIGNIFICANT CHANGE UP (ref 0–0)
PHOSPHATE SERPL-MCNC: 4 MG/DL — SIGNIFICANT CHANGE UP (ref 2.5–4.5)
PLATELET # BLD AUTO: 369 K/UL — SIGNIFICANT CHANGE UP (ref 150–400)
PMV BLD: 10 FL — SIGNIFICANT CHANGE UP (ref 7–13)
POTASSIUM SERPL-MCNC: 4 MMOL/L — SIGNIFICANT CHANGE UP (ref 3.5–5.3)
POTASSIUM SERPL-SCNC: 4 MMOL/L — SIGNIFICANT CHANGE UP (ref 3.5–5.3)
RBC # BLD: 4.78 M/UL — SIGNIFICANT CHANGE UP (ref 4.2–5.8)
RBC # FLD: 14.2 % — SIGNIFICANT CHANGE UP (ref 10.3–14.5)
SODIUM SERPL-SCNC: 140 MMOL/L — SIGNIFICANT CHANGE UP (ref 135–145)
WBC # BLD: 6.48 K/UL — SIGNIFICANT CHANGE UP (ref 3.8–10.5)
WBC # FLD AUTO: 6.48 K/UL — SIGNIFICANT CHANGE UP (ref 3.8–10.5)

## 2019-08-18 PROCEDURE — 76770 US EXAM ABDO BACK WALL COMP: CPT | Mod: 26

## 2019-08-18 RX ORDER — DEXAMETHASONE 0.5 MG/5ML
4 ELIXIR ORAL ONCE
Refills: 0 | Status: COMPLETED | OUTPATIENT
Start: 2019-08-18 | End: 2019-08-18

## 2019-08-18 RX ORDER — KETOROLAC TROMETHAMINE 30 MG/ML
30 SYRINGE (ML) INJECTION EVERY 6 HOURS
Refills: 0 | Status: DISCONTINUED | OUTPATIENT
Start: 2019-08-18 | End: 2019-08-19

## 2019-08-18 RX ORDER — KETOROLAC TROMETHAMINE 30 MG/ML
30 SYRINGE (ML) INJECTION ONCE
Refills: 0 | Status: DISCONTINUED | OUTPATIENT
Start: 2019-08-18 | End: 2019-08-18

## 2019-08-18 RX ORDER — KETOROLAC TROMETHAMINE 30 MG/ML
15 SYRINGE (ML) INJECTION ONCE
Refills: 0 | Status: DISCONTINUED | OUTPATIENT
Start: 2019-08-18 | End: 2019-08-18

## 2019-08-18 RX ADMIN — Medication 1 TABLET(S): at 11:46

## 2019-08-18 RX ADMIN — OXYCODONE AND ACETAMINOPHEN 2 TABLET(S): 5; 325 TABLET ORAL at 05:57

## 2019-08-18 RX ADMIN — NAFCILLIN 200 GRAM(S): 10 INJECTION, POWDER, FOR SOLUTION INTRAVENOUS at 07:38

## 2019-08-18 RX ADMIN — Medication 6 MILLIGRAM(S): at 20:31

## 2019-08-18 RX ADMIN — OXYCODONE AND ACETAMINOPHEN 2 TABLET(S): 5; 325 TABLET ORAL at 12:52

## 2019-08-18 RX ADMIN — Medication 4 MILLIGRAM(S): at 03:41

## 2019-08-18 RX ADMIN — OXYCODONE HYDROCHLORIDE 10 MILLIGRAM(S): 5 TABLET ORAL at 00:07

## 2019-08-18 RX ADMIN — Medication 15 MILLIGRAM(S): at 09:47

## 2019-08-18 RX ADMIN — Medication 30 MILLIGRAM(S): at 14:52

## 2019-08-18 RX ADMIN — Medication 1 TABLET(S): at 00:53

## 2019-08-18 RX ADMIN — Medication 15 MILLIGRAM(S): at 10:18

## 2019-08-18 RX ADMIN — OXYCODONE AND ACETAMINOPHEN 2 TABLET(S): 5; 325 TABLET ORAL at 18:05

## 2019-08-18 RX ADMIN — Medication 50 MILLIGRAM(S): at 01:05

## 2019-08-18 RX ADMIN — Medication 15 MILLIGRAM(S): at 08:08

## 2019-08-18 RX ADMIN — Medication 20 MILLIGRAM(S): at 05:30

## 2019-08-18 RX ADMIN — NAFCILLIN 200 GRAM(S): 10 INJECTION, POWDER, FOR SOLUTION INTRAVENOUS at 00:01

## 2019-08-18 RX ADMIN — Medication 1 TABLET(S): at 01:30

## 2019-08-18 RX ADMIN — NAFCILLIN 200 GRAM(S): 10 INJECTION, POWDER, FOR SOLUTION INTRAVENOUS at 11:48

## 2019-08-18 RX ADMIN — Medication 20 MILLIGRAM(S): at 17:00

## 2019-08-18 RX ADMIN — Medication 15 MILLIGRAM(S): at 07:38

## 2019-08-18 RX ADMIN — NAFCILLIN 200 GRAM(S): 10 INJECTION, POWDER, FOR SOLUTION INTRAVENOUS at 20:30

## 2019-08-18 RX ADMIN — Medication 30 MILLIGRAM(S): at 20:31

## 2019-08-18 RX ADMIN — NAFCILLIN 200 GRAM(S): 10 INJECTION, POWDER, FOR SOLUTION INTRAVENOUS at 04:03

## 2019-08-18 RX ADMIN — OXYCODONE AND ACETAMINOPHEN 2 TABLET(S): 5; 325 TABLET ORAL at 18:58

## 2019-08-18 RX ADMIN — GABAPENTIN 600 MILLIGRAM(S): 400 CAPSULE ORAL at 13:08

## 2019-08-18 RX ADMIN — Medication 30 MILLIGRAM(S): at 01:04

## 2019-08-18 RX ADMIN — Medication 50 MILLIGRAM(S): at 11:46

## 2019-08-18 RX ADMIN — OXYCODONE AND ACETAMINOPHEN 2 TABLET(S): 5; 325 TABLET ORAL at 05:26

## 2019-08-18 RX ADMIN — OXYCODONE AND ACETAMINOPHEN 2 TABLET(S): 5; 325 TABLET ORAL at 11:52

## 2019-08-18 RX ADMIN — TIZANIDINE 4 MILLIGRAM(S): 4 TABLET ORAL at 11:46

## 2019-08-18 RX ADMIN — GABAPENTIN 600 MILLIGRAM(S): 400 CAPSULE ORAL at 20:32

## 2019-08-18 RX ADMIN — NAFCILLIN 200 GRAM(S): 10 INJECTION, POWDER, FOR SOLUTION INTRAVENOUS at 16:41

## 2019-08-18 RX ADMIN — Medication 30 MILLIGRAM(S): at 21:01

## 2019-08-18 RX ADMIN — GABAPENTIN 600 MILLIGRAM(S): 400 CAPSULE ORAL at 05:27

## 2019-08-18 RX ADMIN — Medication 30 MILLIGRAM(S): at 15:22

## 2019-08-18 RX ADMIN — TIZANIDINE 4 MILLIGRAM(S): 4 TABLET ORAL at 05:27

## 2019-08-18 NOTE — PROGRESS NOTE ADULT - SUBJECTIVE AND OBJECTIVE BOX
LUZ BUCKLEY:[id number],   54yMale followed for:  No Known Allergies    PAST MEDICAL & SURGICAL HISTORY:  Sciatica  S/P ORIF (open reduction internal fixation) fracture    FAMILY HISTORY:    MEDICATIONS  (STANDING):  enalapril 20 milliGRAM(s) Oral every 12 hours  gabapentin 600 milliGRAM(s) Oral three times a day  lidocaine   Patch 1 Patch Transdermal daily  melatonin 6 milliGRAM(s) Oral at bedtime  multivitamin      multivitamin 1 Tablet(s) Oral daily  nafcillin  IVPB      nafcillin  IVPB 2 Gram(s) IV Intermittent every 4 hours  nortriptyline 25 milliGRAM(s) Oral at bedtime  pantoprazole    Tablet 40 milliGRAM(s) Oral before breakfast  tiZANidine 4 milliGRAM(s) Oral every 6 hours    MEDICATIONS  (PRN):  cloNIDine 0.1 milliGRAM(s) Oral two times a day PRN withdrawl symptoms  hydrOXYzine hydrochloride 50 milliGRAM(s) Oral every 8 hours PRN Anxiety  oxyCODONE    5 mG/acetaminophen 325 mG 2 Tablet(s) Oral every 6 hours PRN Severe Pain (7 - 10)      Vital Signs Last 24 Hrs  T(C): 36.8 (18 Aug 2019 05:26), Max: 37.1 (17 Aug 2019 21:20)  T(F): 98.2 (18 Aug 2019 05:26), Max: 98.7 (17 Aug 2019 21:20)  HR: 70 (18 Aug 2019 05:26) (59 - 85)  BP: 126/88 (18 Aug 2019 05:26) (126/88 - 149/108)  BP(mean): --  RR: 17 (18 Aug 2019 05:26) (17 - 19)  SpO2: 99% (18 Aug 2019 05:26) (99% - 100%)  nc/at  s1s2  cta  soft, nt, nd no guarding or rebound  no c/c/e    CBC Full  -  ( 18 Aug 2019 08:45 )  WBC Count : 6.48 K/uL  RBC Count : 4.78 M/uL  Hemoglobin : 14.1 g/dL  Hematocrit : 42.9 %  Platelet Count - Automated : 369 K/uL  Mean Cell Volume : 89.7 fL  Mean Cell Hemoglobin : 29.5 pg  Mean Cell Hemoglobin Concentration : 32.9 %  Auto Neutrophil # : x  Auto Lymphocyte # : x  Auto Monocyte # : x  Auto Eosinophil # : x  Auto Basophil # : x  Auto Neutrophil % : x  Auto Lymphocyte % : x  Auto Monocyte % : x  Auto Eosinophil % : x  Auto Basophil % : x    08-18    140  |  105  |  20  ----------------------------<  118<H>  4.0   |  24  |  1.05    Ca    8.9      18 Aug 2019 06:15  Phos  4.0     08-18  Mg     1.9     08-18

## 2019-08-18 NOTE — PROGRESS NOTE ADULT - SUBJECTIVE AND OBJECTIVE BOX
MARCIO LUZ  54y  Male      Patient is a 54y old  Male who presents with a chief complaint of back pain and sciatica (18 Aug 2019 11:32)  feels better,less back pain.no fever,no sob,no cp    REVIEW OF SYSTEMS:  neg    INTERVAL HPI/OVERNIGHT EVENTS:  T(C): 36.6 (08-18-19 @ 13:15), Max: 37.1 (08-17-19 @ 21:20)  HR: 70 (08-18-19 @ 18:00) (59 - 85)  BP: 150/102 (08-18-19 @ 18:00) (126/88 - 150/102)  RR: 18 (08-18-19 @ 13:15) (17 - 19)  SpO2: 100% (08-18-19 @ 13:15) (99% - 100%)  Wt(kg): --  I&O's Summary    17 Aug 2019 07:01  -  18 Aug 2019 07:00  --------------------------------------------------------  IN: 800 mL / OUT: 700 mL / NET: 100 mL      T(C): 36.6 (08-18-19 @ 13:15), Max: 37.1 (08-17-19 @ 21:20)  HR: 70 (08-18-19 @ 18:00) (59 - 85)  BP: 150/102 (08-18-19 @ 18:00) (126/88 - 150/102)  RR: 18 (08-18-19 @ 13:15) (17 - 19)  SpO2: 100% (08-18-19 @ 13:15) (99% - 100%)  Wt(kg): --Vital Signs Last 24 Hrs  T(C): 36.6 (18 Aug 2019 13:15), Max: 37.1 (17 Aug 2019 21:20)  T(F): 97.9 (18 Aug 2019 13:15), Max: 98.7 (17 Aug 2019 21:20)  HR: 70 (18 Aug 2019 18:00) (59 - 85)  BP: 150/102 (18 Aug 2019 18:00) (126/88 - 150/102)  BP(mean): --  RR: 18 (18 Aug 2019 13:15) (17 - 19)  SpO2: 100% (18 Aug 2019 13:15) (99% - 100%)    LABS:                        14.1   6.48  )-----------( 369      ( 18 Aug 2019 08:45 )             42.9     08-18    140  |  105  |  20  ----------------------------<  118<H>  4.0   |  24  |  1.05    Ca    8.9      18 Aug 2019 06:15  Phos  4.0     08-18  Mg     1.9     08-18          CAPILLARY BLOOD GLUCOSE                PAST MEDICAL & SURGICAL HISTORY:  Sciatica  S/P ORIF (open reduction internal fixation) fracture      MEDICATIONS  (STANDING):  enalapril 20 milliGRAM(s) Oral every 12 hours  gabapentin 600 milliGRAM(s) Oral three times a day  ketorolac   Injectable 30 milliGRAM(s) IV Push every 6 hours  lidocaine   Patch 1 Patch Transdermal daily  melatonin 6 milliGRAM(s) Oral at bedtime  multivitamin      multivitamin 1 Tablet(s) Oral daily  nafcillin  IVPB      nafcillin  IVPB 2 Gram(s) IV Intermittent every 4 hours  nortriptyline 25 milliGRAM(s) Oral at bedtime  pantoprazole    Tablet 40 milliGRAM(s) Oral before breakfast    MEDICATIONS  (PRN):  cloNIDine 0.1 milliGRAM(s) Oral two times a day PRN withdrawl symptoms  hydrOXYzine hydrochloride 50 milliGRAM(s) Oral every 8 hours PRN Anxiety  oxyCODONE    5 mG/acetaminophen 325 mG 2 Tablet(s) Oral every 6 hours PRN Severe Pain (7 - 10)        RADIOLOGY & ADDITIONAL TESTS:    Imaging Personally Reviewed:  [ ] YES  [ ] NO    Consultant(s) Notes Reviewed:  [ ] YES  [ ] NO    PHYSICAL EXAM:  GENERAL: NAD, well-groomed, well-developed  HEAD:  Atraumatic, Normocephalic  EYES: EOMI, PERRLA, conjunctiva and sclera clear  ENMT: No tonsillar erythema, exudates, or enlargement; Moist mucous membranes, Good dentition, No lesions  NECK: Supple, No JVD, Normal thyroid  NERVOUS SYSTEM:  Alert & Oriented X3, Good concentration; Motor Strength 5/5 B/L upper and lower extremities; DTRs 2+ intact and symmetric  CHEST/LUNG: Clear to percussion bilaterally; No rales, rhonchi, wheezing, or rubs  HEART: Regular rate and rhythm; No murmurs, rubs, or gallops  ABDOMEN: Soft, Nontender, Nondistended; Bowel sounds present  EXTREMITIES:  2+ Peripheral Pulses, No clubbing, cyanosis, or edema,back-no tenderness  LYMPH: No lymphadenopathy noted  SKIN: No rashes or lesions    Care Discussed with Consultants/Other Providers [ ] YES  [ ] NO      Code Status: [] Full Code [] DNR [] DNI [] Goals of Care:   Disposition: [] ICU [] Stroke Unit [] RCU []PCU []Floor [] Discharge Home         CECY Rodriguez.FACP

## 2019-08-18 NOTE — PROGRESS NOTE ADULT - ASSESSMENT
ARNOLDO:   Improved but is on ACE-I and Toradol PRN so will monitor.    Sciatica:  On Pain meds.    Hypertension:  Controlled.  Continue ACE_I.  Monitor BMP.

## 2019-08-18 NOTE — PROVIDER CONTACT NOTE (OTHER) - ASSESSMENT
pt aaox4 sitting anxiously in bed, states he is in pain and will be medicated as per order pt aaox4 sitting anxiously in bed, states he is in pain and will be medicated as per order. pt denies sob, chest pain and trouble breathing - pt in no apparent distress.

## 2019-08-18 NOTE — PROGRESS NOTE ADULT - SUBJECTIVE AND OBJECTIVE BOX
LUZ BUCKLEY  54y  Patient is a 54y old  Male who presents with a chief complaint of back pain and sciatica (18 Aug 2019 10:25)    HPI:  Complain of Pain, better after meds. Received toradol. Had ARNOLDO, improved but will need monitoring, since he gets NSAIDs intermittently.  HEALTH ISSUES - PROBLEM Dx:  Heroin dependence: Heroin dependence  Moderate protein-calorie malnutrition: Moderate protein-calorie malnutrition  Heroin dependence: Heroin dependence  Acute right-sided low back pain with right-sided sciatica: Acute right-sided low back pain with right-sided sciatica        MEDICATIONS  (STANDING):  enalapril 20 milliGRAM(s) Oral every 12 hours  gabapentin 600 milliGRAM(s) Oral three times a day  lidocaine   Patch 1 Patch Transdermal daily  melatonin 6 milliGRAM(s) Oral at bedtime  multivitamin      multivitamin 1 Tablet(s) Oral daily  nafcillin  IVPB      nafcillin  IVPB 2 Gram(s) IV Intermittent every 4 hours  nortriptyline 25 milliGRAM(s) Oral at bedtime  pantoprazole    Tablet 40 milliGRAM(s) Oral before breakfast  tiZANidine 4 milliGRAM(s) Oral every 6 hours    MEDICATIONS  (PRN):  cloNIDine 0.1 milliGRAM(s) Oral two times a day PRN withdrawl symptoms  hydrOXYzine hydrochloride 50 milliGRAM(s) Oral every 8 hours PRN Anxiety  oxyCODONE    5 mG/acetaminophen 325 mG 2 Tablet(s) Oral every 6 hours PRN Severe Pain (7 - 10)    Vital Signs Last 24 Hrs  T(C): 36.8 (18 Aug 2019 05:26), Max: 37.1 (17 Aug 2019 21:20)  T(F): 98.2 (18 Aug 2019 05:26), Max: 98.7 (17 Aug 2019 21:20)  HR: 70 (18 Aug 2019 05:26) (59 - 85)  BP: 126/88 (18 Aug 2019 05:26) (126/88 - 149/108)  BP(mean): --  RR: 17 (18 Aug 2019 05:26) (17 - 19)  SpO2: 99% (18 Aug 2019 05:26) (99% - 100%)  Daily     Daily     PHYSICAL EXAM:  Constitutional: He  appears comfortable and not distressed. Not diaphoretic.    Respiratory: The lungs are clear to auscultation. No dullness and expansion is normal.    Cardiovascular: S1 and S2 are normal. No mummurs, rubs or gallops are present.    Gastrointestinal: The abdomen is soft. No tenderness is present. No masses are present. Bowel sounds are normal.    Genitourinary: The bladder is not distended. No CVA tenderness is present.    Extremities: No edema is noted. No deformities are present.    Neurological: Cognition is normal. Tone, power and sensation are normal.                             14.1   6.48  )-----------( 369      ( 18 Aug 2019 08:45 )             42.9     08-18    140  |  105  |  20  ----------------------------<  118<H>  4.0   |  24  |  1.05    Ca    8.9      18 Aug 2019 06:15  Phos  4.0     08-18  Mg     1.9     08-18

## 2019-08-19 PROCEDURE — 99233 SBSQ HOSP IP/OBS HIGH 50: CPT

## 2019-08-19 PROCEDURE — 72158 MRI LUMBAR SPINE W/O & W/DYE: CPT | Mod: 26

## 2019-08-19 RX ORDER — OXYCODONE AND ACETAMINOPHEN 5; 325 MG/1; MG/1
1 TABLET ORAL ONCE
Refills: 0 | Status: DISCONTINUED | OUTPATIENT
Start: 2019-08-19 | End: 2019-08-19

## 2019-08-19 RX ORDER — MORPHINE SULFATE 50 MG/1
0.5 CAPSULE, EXTENDED RELEASE ORAL ONCE
Refills: 0 | Status: DISCONTINUED | OUTPATIENT
Start: 2019-08-19 | End: 2019-08-19

## 2019-08-19 RX ORDER — KETOROLAC TROMETHAMINE 30 MG/ML
30 SYRINGE (ML) INJECTION EVERY 6 HOURS
Refills: 0 | Status: DISCONTINUED | OUTPATIENT
Start: 2019-08-19 | End: 2019-08-21

## 2019-08-19 RX ORDER — HYDRALAZINE HCL 50 MG
10 TABLET ORAL ONCE
Refills: 0 | Status: COMPLETED | OUTPATIENT
Start: 2019-08-19 | End: 2019-08-19

## 2019-08-19 RX ORDER — GABAPENTIN 400 MG/1
800 CAPSULE ORAL THREE TIMES A DAY
Refills: 0 | Status: DISCONTINUED | OUTPATIENT
Start: 2019-08-19 | End: 2019-09-06

## 2019-08-19 RX ADMIN — NAFCILLIN 200 GRAM(S): 10 INJECTION, POWDER, FOR SOLUTION INTRAVENOUS at 06:08

## 2019-08-19 RX ADMIN — NAFCILLIN 200 GRAM(S): 10 INJECTION, POWDER, FOR SOLUTION INTRAVENOUS at 12:37

## 2019-08-19 RX ADMIN — MORPHINE SULFATE 0.5 MILLIGRAM(S): 50 CAPSULE, EXTENDED RELEASE ORAL at 11:56

## 2019-08-19 RX ADMIN — Medication 30 MILLIGRAM(S): at 20:27

## 2019-08-19 RX ADMIN — OXYCODONE AND ACETAMINOPHEN 2 TABLET(S): 5; 325 TABLET ORAL at 12:37

## 2019-08-19 RX ADMIN — Medication 20 MILLIGRAM(S): at 06:17

## 2019-08-19 RX ADMIN — Medication 30 MILLIGRAM(S): at 09:03

## 2019-08-19 RX ADMIN — NAFCILLIN 200 GRAM(S): 10 INJECTION, POWDER, FOR SOLUTION INTRAVENOUS at 20:27

## 2019-08-19 RX ADMIN — OXYCODONE AND ACETAMINOPHEN 2 TABLET(S): 5; 325 TABLET ORAL at 00:08

## 2019-08-19 RX ADMIN — OXYCODONE AND ACETAMINOPHEN 2 TABLET(S): 5; 325 TABLET ORAL at 23:45

## 2019-08-19 RX ADMIN — OXYCODONE AND ACETAMINOPHEN 2 TABLET(S): 5; 325 TABLET ORAL at 19:00

## 2019-08-19 RX ADMIN — Medication 30 MILLIGRAM(S): at 11:25

## 2019-08-19 RX ADMIN — OXYCODONE AND ACETAMINOPHEN 2 TABLET(S): 5; 325 TABLET ORAL at 23:15

## 2019-08-19 RX ADMIN — MORPHINE SULFATE 0.5 MILLIGRAM(S): 50 CAPSULE, EXTENDED RELEASE ORAL at 11:15

## 2019-08-19 RX ADMIN — Medication 30 MILLIGRAM(S): at 03:32

## 2019-08-19 RX ADMIN — Medication 30 MILLIGRAM(S): at 21:00

## 2019-08-19 RX ADMIN — GABAPENTIN 600 MILLIGRAM(S): 400 CAPSULE ORAL at 06:09

## 2019-08-19 RX ADMIN — Medication 20 MILLIGRAM(S): at 18:11

## 2019-08-19 RX ADMIN — GABAPENTIN 600 MILLIGRAM(S): 400 CAPSULE ORAL at 15:06

## 2019-08-19 RX ADMIN — OXYCODONE AND ACETAMINOPHEN 2 TABLET(S): 5; 325 TABLET ORAL at 06:09

## 2019-08-19 RX ADMIN — TIZANIDINE 4 MILLIGRAM(S): 4 TABLET ORAL at 01:06

## 2019-08-19 RX ADMIN — TIZANIDINE 4 MILLIGRAM(S): 4 TABLET ORAL at 15:11

## 2019-08-19 RX ADMIN — Medication 30 MILLIGRAM(S): at 03:02

## 2019-08-19 RX ADMIN — NAFCILLIN 200 GRAM(S): 10 INJECTION, POWDER, FOR SOLUTION INTRAVENOUS at 00:07

## 2019-08-19 RX ADMIN — OXYCODONE AND ACETAMINOPHEN 1 TABLET(S): 5; 325 TABLET ORAL at 09:18

## 2019-08-19 RX ADMIN — OXYCODONE AND ACETAMINOPHEN 1 TABLET(S): 5; 325 TABLET ORAL at 10:00

## 2019-08-19 RX ADMIN — OXYCODONE AND ACETAMINOPHEN 2 TABLET(S): 5; 325 TABLET ORAL at 18:13

## 2019-08-19 RX ADMIN — NAFCILLIN 200 GRAM(S): 10 INJECTION, POWDER, FOR SOLUTION INTRAVENOUS at 16:12

## 2019-08-19 RX ADMIN — Medication 30 MILLIGRAM(S): at 15:43

## 2019-08-19 RX ADMIN — OXYCODONE AND ACETAMINOPHEN 2 TABLET(S): 5; 325 TABLET ORAL at 00:38

## 2019-08-19 RX ADMIN — Medication 30 MILLIGRAM(S): at 15:06

## 2019-08-19 RX ADMIN — Medication 6 MILLIGRAM(S): at 23:19

## 2019-08-19 RX ADMIN — Medication 1 TABLET(S): at 12:37

## 2019-08-19 RX ADMIN — OXYCODONE AND ACETAMINOPHEN 2 TABLET(S): 5; 325 TABLET ORAL at 13:15

## 2019-08-19 RX ADMIN — Medication 50 MILLIGRAM(S): at 01:06

## 2019-08-19 NOTE — PROGRESS NOTE ADULT - SUBJECTIVE AND OBJECTIVE BOX
[facility] [place] [facility]   MD JEYSON GALAN DO ANGELA WONG, PA    TEL:  OFFICE: [phone]  DR FORREST CELL: [phone]  DR. OCHOA CELL: [phone]  MARAL CHUN CELL: [phone]        Patient is a 54y old  Male who presents with a chief complaint of back pain and sciatica (19 Aug 2019 08:25)      Patient seen and examined at bedside. No chest pain/sob    VITALS:  T(F): 98.7 (08-19-19 @ 14:05), Max: 99.2 (08-18-19 @ 21:06)  HR: 80 (08-19-19 @ 14:05)  BP: 143/99 (08-19-19 @ 14:05)  RR: 18 (08-19-19 @ 14:05)  SpO2: 100% (08-19-19 @ 14:05)  Wt(kg): --        PHYSICAL EXAM:  Constitutional: NAD  Neck: No JVD  Respiratory: CTAB, no wheezes, rales or rhonchi  Cardiovascular: S1, S2, RRR  Gastrointestinal: BS+, soft, NT/ND  Extremities: No peripheral edema    Hospital Medications:   MEDICATIONS  (STANDING):  enalapril 20 milliGRAM(s) Oral every 12 hours  gabapentin 800 milliGRAM(s) Oral three times a day  ketorolac   Injectable 30 milliGRAM(s) IV Push every 6 hours  lidocaine   Patch 1 Patch Transdermal daily  melatonin 6 milliGRAM(s) Oral at bedtime  multivitamin      multivitamin 1 Tablet(s) Oral daily  nafcillin  IVPB      nafcillin  IVPB 2 Gram(s) IV Intermittent every 4 hours  nortriptyline 25 milliGRAM(s) Oral at bedtime  pantoprazole    Tablet 40 milliGRAM(s) Oral before breakfast      LABS:  08-18    140  |  105  |  20  ----------------------------<  118<H>  4.0   |  24  |  1.05    Ca    8.9      18 Aug 2019 06:15  Phos  4.0     08-18  Mg     1.9     08-18      Creatinine Trend: 1.05 <--, 0.91 <--, 0.90 <--, 1.08 <--                                14.1   6.48  )-----------( 369      ( 18 Aug 2019 08:45 )             42.9     Urine Studies:  Urinalysis - [08-12-19 @ 21:20]      Color YELLOW / Appearance CLEAR / SG 1.033 / pH 5.5      Gluc NEGATIVE / Ketone NEGATIVE  / Bili NEGATIVE / Urobili NORMAL       Blood NEGATIVE / Protein 30 / Leuk Est NEGATIVE / Nitrite NEGATIVE      RBC 0-2 / WBC 3-5 / Hyaline NEGATIVE / Gran  / Sq Epi OCC / Non Sq Epi  / Bacteria NEGATIVE    Urine Creatinine 215.70      [08-12-19 @ 21:20]  Urine Protein 39.4      [08-12-19 @ 21:20]  Urine Sodium 31      [08-12-19 @ 21:20]      HCV 9.89, Reactive      [08-05-19 @ 05:25]  HIV Nonreactive The HIV Ag/Ab Combo test performed screens for HIV-1 p24  antigen, antibodies to HIV-1 (group M and group O), and  antibodies to HIV-2. All specimens repeatedly reactive  will reflex to an HIV 1/2 antibody confirmation and  differentiation test. This assay detects p24 antigen which  may be present prior to the development of HIV antibodies,  therefore a reactive result with a negative HIV 1/2 AB  Confirmation should be followed up with HIV-1 RNA, HIV-2  RNA and repeat testing in 4-8 weeks. A nonreactive result  does not preclude previous exposure to or infection with  HIV-1 or HIV-2. [facility] prohibits disclosure of this  result to any unauthorized party.      [08-10-19 @ 06:00]      RADIOLOGY & ADDITIONAL STUDIES:

## 2019-08-19 NOTE — PROGRESS NOTE ADULT - ASSESSMENT
55 yo male active heroin use with chronic back pain, kidney cyst a/w acute on chronic back pain with sciatica with difficulty ambulating due to pain 8/4. patient found to have s. aureus bacteremia on cefazolin iv. nephrology consulted for ARNOLDO.     ARNOLDO  peaked 1.79   ARNOLDO likely prerenal on NSAIDs and lisinopril  renal function improved with IVF  off ibuprofen.   renal us refused by patient  restarted on lisinopril 10 8/12 by team  avoid NSAIDS, nephrotoxic agents  monitor bmp, urine output     HTN  controlled  restarted on lisinopril by team  monitor bmp and bp    Hep C  f/u GI  outpatient treatment    Proteinuria  mild  p/c ratio <300, Monitor  hep c +

## 2019-08-19 NOTE — PROGRESS NOTE ADULT - ASSESSMENT
54 m with IVDA, hepatitis C (not treated), RLE ORIF and hardware, previous bacteremia and lumbar osteo? 2004, presented with worsening back pain, with difficulty ambulating due to pain.  here afebrile normal WBC  blood cx: 2/2 MSSA  hep C viral load: [id number]  spine MRI: enhancing lesion on L3 s/o occult fx or a lesion but CT negative for FX or lesion  hiv negative    MSSA bacteremia and ?L3 osteo (read as occult fx or a lesion on MRI but CT negative for FX or lesion), pt also has ORIF on the LE but no symptoms there   active heroine use  repeat cx 8/12 negative   TTE negative  repeat MRI again with L3 enhancement which could be post traumatic or infection      * c/w Nafcillin 2 q 4  * pt can not be discharged with a line in view of active IVDA  * will have continue the course here or another rehab facility  * will do an 8 week course in view of L3 enhancement and worsened back pain when he was bacteremic  * f/u with pain management, pt in distress due to pain

## 2019-08-19 NOTE — PROGRESS NOTE ADULT - SUBJECTIVE AND OBJECTIVE BOX
MARCIO LUZ  54y  Male      Patient is a 54y old  Male who presents with a chief complaint of back pain and sciatica (19 Aug 2019 16:32)  c/o back pain,mod-severe.current pain management not enough?,no sob,no cp,no fever    REVIEW OF SYSTEMS:  as above    INTERVAL HPI/OVERNIGHT EVENTS:  T(C): 37.1 (08-19-19 @ 14:05), Max: 37.3 (08-18-19 @ 21:06)  HR: 80 (08-19-19 @ 14:05) (63 - 85)  BP: 143/99 (08-19-19 @ 14:05) (126/86 - 151/103)  RR: 18 (08-19-19 @ 14:05) (17 - 18)  SpO2: 100% (08-19-19 @ 14:05) (100% - 100%)  Wt(kg): --  I&O's Summary    T(C): 37.1 (08-19-19 @ 14:05), Max: 37.3 (08-18-19 @ 21:06)  HR: 80 (08-19-19 @ 14:05) (63 - 85)  BP: 143/99 (08-19-19 @ 14:05) (126/86 - 151/103)  RR: 18 (08-19-19 @ 14:05) (17 - 18)  SpO2: 100% (08-19-19 @ 14:05) (100% - 100%)  Wt(kg): --Vital Signs Last 24 Hrs  T(C): 37.1 (19 Aug 2019 14:05), Max: 37.3 (18 Aug 2019 21:06)  T(F): 98.7 (19 Aug 2019 14:05), Max: 99.2 (18 Aug 2019 21:06)  HR: 80 (19 Aug 2019 14:05) (63 - 85)  BP: 143/99 (19 Aug 2019 14:05) (126/86 - 151/103)  BP(mean): --  RR: 18 (19 Aug 2019 14:05) (17 - 18)  SpO2: 100% (19 Aug 2019 14:05) (100% - 100%)    LABS:                        14.1   6.48  )-----------( 369      ( 18 Aug 2019 08:45 )             42.9     08-18    140  |  105  |  20  ----------------------------<  118<H>  4.0   |  24  |  1.05    Ca    8.9      18 Aug 2019 06:15  Phos  4.0     08-18  Mg     1.9     08-18          CAPILLARY BLOOD GLUCOSE                PAST MEDICAL & SURGICAL HISTORY:  Sciatica  S/P ORIF (open reduction internal fixation) fracture      MEDICATIONS  (STANDING):  enalapril 20 milliGRAM(s) Oral every 12 hours  gabapentin 800 milliGRAM(s) Oral three times a day  ketorolac   Injectable 30 milliGRAM(s) IV Push every 6 hours  lidocaine   Patch 1 Patch Transdermal daily  melatonin 6 milliGRAM(s) Oral at bedtime  multivitamin      multivitamin 1 Tablet(s) Oral daily  nafcillin  IVPB      nafcillin  IVPB 2 Gram(s) IV Intermittent every 4 hours  nortriptyline 25 milliGRAM(s) Oral at bedtime  pantoprazole    Tablet 40 milliGRAM(s) Oral before breakfast    MEDICATIONS  (PRN):  cloNIDine 0.1 milliGRAM(s) Oral two times a day PRN withdrawl symptoms  hydrOXYzine hydrochloride 50 milliGRAM(s) Oral every 8 hours PRN Anxiety  oxyCODONE    5 mG/acetaminophen 325 mG 2 Tablet(s) Oral every 6 hours PRN Severe Pain (7 - 10)  tiZANidine 4 milliGRAM(s) Oral every 6 hours PRN Muscle Spasm        RADIOLOGY & ADDITIONAL TESTS:    Imaging Personally Reviewed:  [ ] YES  [ ] NO    Consultant(s) Notes Reviewed:  [ ] YES  [ ] NO    PHYSICAL EXAM:  GENERAL: NAD, well-groomed, well-developed  HEAD:  Atraumatic, Normocephalic  EYES: EOMI, PERRLA, conjunctiva and sclera clear  ENMT: No tonsillar erythema, exudates, or enlargement; Moist mucous membranes, Good dentition, No lesions  NECK: Supple, No JVD, Normal thyroid  NERVOUS SYSTEM:  Alert & Oriented X3, Good concentration; Motor Strength 5/5 B/L upper and lower extremities; DTRs 2+ intact and symmetric  CHEST/LUNG: Clear to percussion bilaterally; No rales, rhonchi, wheezing, or rubs  HEART: Regular rate and rhythm; No murmurs, rubs, or gallops  ABDOMEN: Soft, Nontender, Nondistended; Bowel sounds present  EXTREMITIES:  2+ Peripheral Pulses, No clubbing, cyanosis, or edema.Back-no tenderness  LYMPH: No lymphadenopathy noted  SKIN: No rashes or lesions    Care Discussed with Consultants/Other Providers [ ] YES  [ ] NO      Code Status: [] Full Code [] DNR [] DNI [] Goals of Care:   Disposition: [] ICU [] Stroke Unit [] RCU []PCU []Floor [] Discharge Home         CECY [email or web address]

## 2019-08-19 NOTE — PROGRESS NOTE ADULT - SUBJECTIVE AND OBJECTIVE BOX
Follow Up:  MSSA bacteremia    Interval History: pt stable and afebrile, the repeat cx 8/12 negative for 24, TTE negative and repeat MRI still with enhancement on L3 which could be post traumatic of infection    ROS:      All other systems negative    Constitutional: no fever, no chills, no weight loss, no night sweats  Eye: no eye pain, no redness, no vision changes  ENT:  no sore throat, no rhinorrhea  Cardiovascular:  no chest pain, no palpitation  Respiratory:  no SOB, no cough  GI:  no abd pain, no vomiting, no diarrhea  urinary: no dysuria, no hematuria, no flank pain  : no penile discharge or bleeding  musculoskeletal:  severe back pain, radiating to RLE not controlled  skin:  no rash  neurology:  no headache, no seizure, no change in mental status          Allergies  No Known Allergies        ANTIMICROBIALS:  nafcillin  IVPB    nafcillin  IVPB 2 every 4 hours      OTHER MEDS:  cloNIDine 0.1 milliGRAM(s) Oral two times a day PRN  enalapril 20 milliGRAM(s) Oral every 12 hours  gabapentin 800 milliGRAM(s) Oral three times a day  hydrOXYzine hydrochloride 50 milliGRAM(s) Oral every 8 hours PRN  ketorolac   Injectable 30 milliGRAM(s) IV Push every 6 hours  lidocaine   Patch 1 Patch Transdermal daily  melatonin 6 milliGRAM(s) Oral at bedtime  multivitamin      multivitamin 1 Tablet(s) Oral daily  nortriptyline 25 milliGRAM(s) Oral at bedtime  oxyCODONE    5 mG/acetaminophen 325 mG 2 Tablet(s) Oral every 6 hours PRN  pantoprazole    Tablet 40 milliGRAM(s) Oral before breakfast  tiZANidine 4 milliGRAM(s) Oral every 6 hours PRN      Vital Signs Last 24 Hrs  T(C): 37.1 (19 Aug 2019 14:05), Max: 37.3 (18 Aug 2019 21:06)  T(F): 98.7 (19 Aug 2019 14:05), Max: 99.2 (18 Aug 2019 21:06)  HR: 80 (19 Aug 2019 14:05) (63 - 85)  BP: 143/99 (19 Aug 2019 14:05) (126/86 - 151/103)  BP(mean): --  RR: 18 (19 Aug 2019 14:05) (17 - 18)  SpO2: 100% (19 Aug 2019 14:05) (100% - 100%)    Physical Exam:  General:    NAD, non toxic  Head: atraumatic, normocephalic  Eyes: normal sclera and conjunctiva  ENT:   no oropharyngeal lesions, no LAD, neck supple  Cardio:    regular S1,S2  Respiratory:   clear b/l, no wheezing  abd:   soft, BS +, not tender, no hepatosplenomegaly  :     no CVAT, no suprapubic tenderness, no romero  Musculoskeletal : RLE scar from ORIF but no tenderness  Skin:    no rash  vascular: no phlebitis, normal pulses  Neurologic:     no focal deficits  psych: normal affect                          14.1   6.48  )-----------( 369      ( 18 Aug 2019 08:45 )             42.9       08-18    140  |  105  |  20  ----------------------------<  118<H>  4.0   |  24  |  1.05    Ca    8.9      18 Aug 2019 06:15  Phos  4.0     08-18  Mg     1.9     08-18            MICROBIOLOGY:  v  BLOOD PERIPHERAL  08-12-19 --  --  --      BLOOD VENOUS  08-10-19 --  --  --      BLOOD  08-08-19 --  --  --      BLOOD  08-07-19 --  --  Staphylococcus aureus  BLOOD CULTURE PCR                RADIOLOGY:  Images below reviewed personally  < from: MR Lumbar Spine w/wo IV Cont (08.19.19 @ 11:13) >  IMPRESSION: Redemonstrated abnormal signal involving the L3 vertebral   body with associated enhancement and edema which has gradually progressed   from the prior exams. There is a small amount of prevertebral soft tissue   swelling/enhancement. Findings favor evolving osteomyelitis.    No evidence of epidural abscess.

## 2019-08-19 NOTE — PROGRESS NOTE ADULT - SUBJECTIVE AND OBJECTIVE BOX
LUZ BUCKLEY:[id number],   54yMale followed for:  No Known Allergies    PAST MEDICAL & SURGICAL HISTORY:  Sciatica  S/P ORIF (open reduction internal fixation) fracture    FAMILY HISTORY:    MEDICATIONS  (STANDING):  enalapril 20 milliGRAM(s) Oral every 12 hours  gabapentin 600 milliGRAM(s) Oral three times a day  ketorolac   Injectable 30 milliGRAM(s) IV Push every 6 hours  lidocaine   Patch 1 Patch Transdermal daily  melatonin 6 milliGRAM(s) Oral at bedtime  multivitamin      multivitamin 1 Tablet(s) Oral daily  nafcillin  IVPB      nafcillin  IVPB 2 Gram(s) IV Intermittent every 4 hours  nortriptyline 25 milliGRAM(s) Oral at bedtime  pantoprazole    Tablet 40 milliGRAM(s) Oral before breakfast    MEDICATIONS  (PRN):  cloNIDine 0.1 milliGRAM(s) Oral two times a day PRN withdrawl symptoms  hydrOXYzine hydrochloride 50 milliGRAM(s) Oral every 8 hours PRN Anxiety  oxyCODONE    5 mG/acetaminophen 325 mG 2 Tablet(s) Oral every 6 hours PRN Severe Pain (7 - 10)  tiZANidine 4 milliGRAM(s) Oral every 6 hours PRN Muscle Spasm      Vital Signs Last 24 Hrs  T(C): 36.8 (19 Aug 2019 06:04), Max: 37.3 (18 Aug 2019 21:06)  T(F): 98.3 (19 Aug 2019 06:04), Max: 99.2 (18 Aug 2019 21:06)  HR: 63 (19 Aug 2019 06:04) (63 - 85)  BP: 126/86 (19 Aug 2019 06:04) (126/86 - 151/103)  BP(mean): --  RR: 18 (19 Aug 2019 06:04) (17 - 18)  SpO2: 100% (19 Aug 2019 06:04) (100% - 100%)  nc/at  s1s2  cta  soft, nt, nd no guarding or rebound  no c/c/e    CBC Full  -  ( 18 Aug 2019 08:45 )  WBC Count : 6.48 K/uL  RBC Count : 4.78 M/uL  Hemoglobin : 14.1 g/dL  Hematocrit : 42.9 %  Platelet Count - Automated : 369 K/uL  Mean Cell Volume : 89.7 fL  Mean Cell Hemoglobin : 29.5 pg  Mean Cell Hemoglobin Concentration : 32.9 %  Auto Neutrophil # : x  Auto Lymphocyte # : x  Auto Monocyte # : x  Auto Eosinophil # : x  Auto Basophil # : x  Auto Neutrophil % : x  Auto Lymphocyte % : x  Auto Monocyte % : x  Auto Eosinophil % : x  Auto Basophil % : x    08-18    140  |  105  |  20  ----------------------------<  118<H>  4.0   |  24  |  1.05    Ca    8.9      18 Aug 2019 06:15  Phos  4.0     08-18  Mg     1.9     08-18

## 2019-08-20 LAB
ANION GAP SERPL CALC-SCNC: 12 MMO/L — SIGNIFICANT CHANGE UP (ref 7–14)
BUN SERPL-MCNC: 21 MG/DL — SIGNIFICANT CHANGE UP (ref 7–23)
CALCIUM SERPL-MCNC: 9 MG/DL — SIGNIFICANT CHANGE UP (ref 8.4–10.5)
CHLORIDE SERPL-SCNC: 105 MMOL/L — SIGNIFICANT CHANGE UP (ref 98–107)
CO2 SERPL-SCNC: 23 MMOL/L — SIGNIFICANT CHANGE UP (ref 22–31)
CREAT SERPL-MCNC: 1.02 MG/DL — SIGNIFICANT CHANGE UP (ref 0.5–1.3)
GLUCOSE SERPL-MCNC: 111 MG/DL — HIGH (ref 70–99)
HCT VFR BLD CALC: 42.3 % — SIGNIFICANT CHANGE UP (ref 39–50)
HGB BLD-MCNC: 13.7 G/DL — SIGNIFICANT CHANGE UP (ref 13–17)
MAGNESIUM SERPL-MCNC: 2 MG/DL — SIGNIFICANT CHANGE UP (ref 1.6–2.6)
MCHC RBC-ENTMCNC: 29.4 PG — SIGNIFICANT CHANGE UP (ref 27–34)
MCHC RBC-ENTMCNC: 32.4 % — SIGNIFICANT CHANGE UP (ref 32–36)
MCV RBC AUTO: 90.8 FL — SIGNIFICANT CHANGE UP (ref 80–100)
NRBC # FLD: 0 K/UL — SIGNIFICANT CHANGE UP (ref 0–0)
PHOSPHATE SERPL-MCNC: 4 MG/DL — SIGNIFICANT CHANGE UP (ref 2.5–4.5)
PLATELET # BLD AUTO: 322 K/UL — SIGNIFICANT CHANGE UP (ref 150–400)
PMV BLD: 9.8 FL — SIGNIFICANT CHANGE UP (ref 7–13)
POTASSIUM SERPL-MCNC: 3.9 MMOL/L — SIGNIFICANT CHANGE UP (ref 3.5–5.3)
POTASSIUM SERPL-SCNC: 3.9 MMOL/L — SIGNIFICANT CHANGE UP (ref 3.5–5.3)
RBC # BLD: 4.66 M/UL — SIGNIFICANT CHANGE UP (ref 4.2–5.8)
RBC # FLD: 14.8 % — HIGH (ref 10.3–14.5)
SODIUM SERPL-SCNC: 140 MMOL/L — SIGNIFICANT CHANGE UP (ref 135–145)
WBC # BLD: 8.16 K/UL — SIGNIFICANT CHANGE UP (ref 3.8–10.5)
WBC # FLD AUTO: 8.16 K/UL — SIGNIFICANT CHANGE UP (ref 3.8–10.5)

## 2019-08-20 PROCEDURE — 99233 SBSQ HOSP IP/OBS HIGH 50: CPT

## 2019-08-20 RX ORDER — SODIUM CHLORIDE 9 MG/ML
1000 INJECTION INTRAMUSCULAR; INTRAVENOUS; SUBCUTANEOUS
Refills: 0 | Status: DISCONTINUED | OUTPATIENT
Start: 2019-08-20 | End: 2019-08-21

## 2019-08-20 RX ADMIN — OXYCODONE AND ACETAMINOPHEN 2 TABLET(S): 5; 325 TABLET ORAL at 05:19

## 2019-08-20 RX ADMIN — Medication 30 MILLIGRAM(S): at 21:20

## 2019-08-20 RX ADMIN — NAFCILLIN 200 GRAM(S): 10 INJECTION, POWDER, FOR SOLUTION INTRAVENOUS at 00:08

## 2019-08-20 RX ADMIN — OXYCODONE AND ACETAMINOPHEN 2 TABLET(S): 5; 325 TABLET ORAL at 18:55

## 2019-08-20 RX ADMIN — NAFCILLIN 200 GRAM(S): 10 INJECTION, POWDER, FOR SOLUTION INTRAVENOUS at 18:09

## 2019-08-20 RX ADMIN — OXYCODONE AND ACETAMINOPHEN 2 TABLET(S): 5; 325 TABLET ORAL at 18:15

## 2019-08-20 RX ADMIN — Medication 30 MILLIGRAM(S): at 08:58

## 2019-08-20 RX ADMIN — Medication 6 MILLIGRAM(S): at 21:51

## 2019-08-20 RX ADMIN — OXYCODONE AND ACETAMINOPHEN 2 TABLET(S): 5; 325 TABLET ORAL at 13:58

## 2019-08-20 RX ADMIN — Medication 30 MILLIGRAM(S): at 02:42

## 2019-08-20 RX ADMIN — NAFCILLIN 200 GRAM(S): 10 INJECTION, POWDER, FOR SOLUTION INTRAVENOUS at 08:57

## 2019-08-20 RX ADMIN — OXYCODONE AND ACETAMINOPHEN 2 TABLET(S): 5; 325 TABLET ORAL at 12:14

## 2019-08-20 RX ADMIN — TIZANIDINE 4 MILLIGRAM(S): 4 TABLET ORAL at 00:08

## 2019-08-20 RX ADMIN — Medication 30 MILLIGRAM(S): at 20:49

## 2019-08-20 RX ADMIN — Medication 30 MILLIGRAM(S): at 09:42

## 2019-08-20 RX ADMIN — SODIUM CHLORIDE 70 MILLILITER(S): 9 INJECTION INTRAMUSCULAR; INTRAVENOUS; SUBCUTANEOUS at 21:50

## 2019-08-20 RX ADMIN — NAFCILLIN 200 GRAM(S): 10 INJECTION, POWDER, FOR SOLUTION INTRAVENOUS at 21:02

## 2019-08-20 RX ADMIN — GABAPENTIN 800 MILLIGRAM(S): 400 CAPSULE ORAL at 21:51

## 2019-08-20 RX ADMIN — OXYCODONE AND ACETAMINOPHEN 2 TABLET(S): 5; 325 TABLET ORAL at 05:49

## 2019-08-20 RX ADMIN — Medication 30 MILLIGRAM(S): at 16:12

## 2019-08-20 RX ADMIN — Medication 30 MILLIGRAM(S): at 03:12

## 2019-08-20 RX ADMIN — Medication 1 TABLET(S): at 12:14

## 2019-08-20 RX ADMIN — NAFCILLIN 200 GRAM(S): 10 INJECTION, POWDER, FOR SOLUTION INTRAVENOUS at 05:14

## 2019-08-20 RX ADMIN — Medication 20 MILLIGRAM(S): at 18:10

## 2019-08-20 RX ADMIN — TIZANIDINE 4 MILLIGRAM(S): 4 TABLET ORAL at 09:07

## 2019-08-20 RX ADMIN — TIZANIDINE 4 MILLIGRAM(S): 4 TABLET ORAL at 15:35

## 2019-08-20 RX ADMIN — Medication 30 MILLIGRAM(S): at 15:35

## 2019-08-20 RX ADMIN — Medication 20 MILLIGRAM(S): at 05:20

## 2019-08-20 RX ADMIN — TIZANIDINE 4 MILLIGRAM(S): 4 TABLET ORAL at 21:51

## 2019-08-20 RX ADMIN — NAFCILLIN 200 GRAM(S): 10 INJECTION, POWDER, FOR SOLUTION INTRAVENOUS at 13:39

## 2019-08-20 NOTE — PROGRESS NOTE ADULT - ASSESSMENT
55 yo male active heroin use with chronic back pain, kidney cyst a/w acute on chronic back pain with sciatica with difficulty ambulating due to pain 8/4. patient found to have s. aureus bacteremia on cefazolin iv. nephrology consulted for ARNOLDO.     ARNOLDO  peaked 1.79   ARNOLDO likely prerenal on NSAIDs and lisinopril  renal function improved with IVF  off ibuprofen.   renal us done showed renal cyst  currently on enalapril 20 bid and ketorolac iv for pain  monitor bmp, urine output     HTN  controlled  monitor bmp and bp    Hep C  f/u GI  outpatient treatment    Proteinuria  mild  p/c ratio <300, Monitor  hep c + 53 yo male active heroin use with chronic back pain, kidney cyst a/w acute on chronic back pain with sciatica with difficulty ambulating due to pain 8/4. patient found to have s. aureus bacteremia on cefazolin iv. nephrology consulted for ARNOLDO.     ARNOLDO  peaked 1.79   ARNOLDO likely prerenal on NSAIDs and lisinopril  renal function improved with IVF  off ibuprofen.   renal us done showed left renal cyst  currently on enalapril 20 bid and ketorolac iv for pain  monitor bmp, urine output     HTN  controlled  monitor bmp and bp    Hep C  f/u GI  outpatient treatment    Proteinuria  mild  p/c ratio <300, Monitor  hep c +

## 2019-08-20 NOTE — PROGRESS NOTE ADULT - SUBJECTIVE AND OBJECTIVE BOX
LUZ BUCKLEY:[id number],   54yMale followed for:  No Known Allergies    PAST MEDICAL & SURGICAL HISTORY:  Sciatica  S/P ORIF (open reduction internal fixation) fracture    FAMILY HISTORY:    MEDICATIONS  (STANDING):  enalapril 20 milliGRAM(s) Oral every 12 hours  gabapentin 800 milliGRAM(s) Oral three times a day  ketorolac   Injectable 30 milliGRAM(s) IV Push every 6 hours  lidocaine   Patch 1 Patch Transdermal daily  melatonin 6 milliGRAM(s) Oral at bedtime  multivitamin      multivitamin 1 Tablet(s) Oral daily  nafcillin  IVPB      nafcillin  IVPB 2 Gram(s) IV Intermittent every 4 hours  nortriptyline 25 milliGRAM(s) Oral at bedtime  pantoprazole    Tablet 40 milliGRAM(s) Oral before breakfast    MEDICATIONS  (PRN):  cloNIDine 0.1 milliGRAM(s) Oral two times a day PRN withdrawl symptoms  hydrOXYzine hydrochloride 50 milliGRAM(s) Oral every 8 hours PRN Anxiety  oxyCODONE    5 mG/acetaminophen 325 mG 2 Tablet(s) Oral every 6 hours PRN Severe Pain (7 - 10)  tiZANidine 4 milliGRAM(s) Oral every 6 hours PRN Muscle Spasm      Vital Signs Last 24 Hrs  T(C): 36.7 (20 Aug 2019 05:17), Max: 37.1 (19 Aug 2019 14:05)  T(F): 98 (20 Aug 2019 05:17), Max: 98.7 (19 Aug 2019 14:05)  HR: 73 (20 Aug 2019 05:17) (68 - 99)  BP: 147/97 (20 Aug 2019 05:17) (138/100 - 156/98)  BP(mean): --  RR: 18 (20 Aug 2019 05:17) (16 - 18)  SpO2: 100% (20 Aug 2019 05:17) (100% - 100%)  nc/at  s1s2  cta  soft, nt, nd no guarding or rebound  no c/c/e    CBC Full  -  ( 18 Aug 2019 08:45 )  WBC Count : 6.48 K/uL  RBC Count : 4.78 M/uL  Hemoglobin : 14.1 g/dL  Hematocrit : 42.9 %  Platelet Count - Automated : 369 K/uL  Mean Cell Volume : 89.7 fL  Mean Cell Hemoglobin : 29.5 pg  Mean Cell Hemoglobin Concentration : 32.9 %  Auto Neutrophil # : x  Auto Lymphocyte # : x  Auto Monocyte # : x  Auto Eosinophil # : x  Auto Basophil # : x  Auto Neutrophil % : x  Auto Lymphocyte % : x  Auto Monocyte % : x  Auto Eosinophil % : x  Auto Basophil % : x

## 2019-08-20 NOTE — PROGRESS NOTE ADULT - SUBJECTIVE AND OBJECTIVE BOX
MARCIOLUZ OBRIEN  54y  Male      Patient is a 54y old  Male who presents with a chief complaint of back pain and sciatica (19 Aug 2019 16:32)  c/o back pain,mod-severe.current pain management not enough?,no sob,no cp,no fever    REVIEW OF SYSTEMS:  as above    MEDICATIONS  (STANDING):  enalapril 20 milliGRAM(s) Oral every 12 hours  gabapentin 800 milliGRAM(s) Oral three times a day  ketorolac   Injectable 30 milliGRAM(s) IV Push every 6 hours  lidocaine   Patch 1 Patch Transdermal daily  melatonin 6 milliGRAM(s) Oral at bedtime  multivitamin      multivitamin 1 Tablet(s) Oral daily  nafcillin  IVPB      nafcillin  IVPB 2 Gram(s) IV Intermittent every 4 hours  nortriptyline 25 milliGRAM(s) Oral at bedtime  pantoprazole    Tablet 40 milliGRAM(s) Oral before breakfast  sodium chloride 0.9%. 1000 milliLiter(s) (70 mL/Hr) IV Continuous <Continuous>    MEDICATIONS  (PRN):  cloNIDine 0.1 milliGRAM(s) Oral two times a day PRN withdrawl symptoms  hydrOXYzine hydrochloride 50 milliGRAM(s) Oral every 8 hours PRN Anxiety  oxyCODONE    5 mG/acetaminophen 325 mG 2 Tablet(s) Oral every 6 hours PRN Severe Pain (7 - 10)  tiZANidine 4 milliGRAM(s) Oral every 6 hours PRN Muscle Spasm    Vital Signs Last 24 Hrs  T(C): 36.4 (20 Aug 2019 22:01), Max: 36.7 (20 Aug 2019 05:17)  T(F): 97.5 (20 Aug 2019 22:01), Max: 98.1 (20 Aug 2019 10:36)  HR: 64 (20 Aug 2019 22:01) (64 - 91)  BP: 137/88 (20 Aug 2019 22:01) (126/91 - 147/97)  BP(mean): --  RR: 18 (20 Aug 2019 22:01) (17 - 18)  SpO2: 99% (20 Aug 2019 22:01) (99% - 100%)    PHYSICAL EXAM:  GENERAL: NAD, well-groomed, well-developed  HEAD:  Atraumatic, Normocephalic  EYES: EOMI, PERRLA, conjunctiva and sclera clear  ENMT: No tonsillar erythema, exudates, or enlargement; Moist mucous membranes, Good dentition, No lesions  NECK: Supple, No JVD, Normal thyroid  NERVOUS SYSTEM:  Alert & Oriented X3, Good concentration; Motor Strength 5/5 B/L upper and lower extremities; DTRs 2+ intact and symmetric  CHEST/LUNG: Clear to percussion bilaterally; No rales, rhonchi, wheezing, or rubs  HEART: Regular rate and rhythm; No murmurs, rubs, or gallops  ABDOMEN: Soft, Nontender, Nondistended; Bowel sounds present  EXTREMITIES:  2+ Peripheral Pulses, No clubbing, cyanosis, or edema.Back-no tenderness  LYMPH: No lymphadenopathy noted  SKIN: No rashes or lesions    LABS:  08-20    140  |  105  |  21  ----------------------------<  111<H>  3.9   |  23  |  1.02    Ca    9.0      20 Aug 2019 06:00  Phos  4.0     08-20  Mg     2.0     08-20      Creatinine Trend: 1.02 <--, 1.05 <--, 0.91 <--, 0.90 <--                        13.7   8.16  )-----------( 322      ( 20 Aug 2019 06:00 )             42.3     Urine Studies:

## 2019-08-20 NOTE — PROGRESS NOTE ADULT - SUBJECTIVE AND OBJECTIVE BOX
[facility]   MD JEYSON GALAN DO ANGELA WONG, PA    TEL:  OFFICE: [phone]  DR FORREST CELL: [phone]  DR. OCHOA CELL: [phone]  MARAL CHUN CELL: [phone]        Patient is a 54y old  Male who presents with a chief complaint of back pain and sciatica (20 Aug 2019 13:20)      Patient seen and examined at bedside. No chest pain/sob    VITALS:  T(F): 98.1 (08-20-19 @ 10:36), Max: 98.6 (08-19-19 @ 18:10)  HR: 91 (08-20-19 @ 10:36)  BP: 146/96 (08-20-19 @ 10:36)  RR: 18 (08-20-19 @ 10:36)  SpO2: 100% (08-20-19 @ 10:36)  Wt(kg): --    08-19 @ 07:01  -  08-20 @ 07:00  --------------------------------------------------------  IN: 0 mL / OUT: 800 mL / NET: -800 mL    08-20 @ 07:01  -  08-20 @ 16:05  --------------------------------------------------------  IN: 0 mL / OUT: 400 mL / NET: -400 mL          PHYSICAL EXAM:  Constitutional: NAD  Neck: No JVD  Respiratory: CTAB, no wheezes, rales or rhonchi  Cardiovascular: S1, S2, RRR  Gastrointestinal: BS+, soft, NT/ND  Extremities: No peripheral edema    Hospital Medications:   MEDICATIONS  (STANDING):  enalapril 20 milliGRAM(s) Oral every 12 hours  gabapentin 800 milliGRAM(s) Oral three times a day  ketorolac   Injectable 30 milliGRAM(s) IV Push every 6 hours  lidocaine   Patch 1 Patch Transdermal daily  melatonin 6 milliGRAM(s) Oral at bedtime  multivitamin      multivitamin 1 Tablet(s) Oral daily  nafcillin  IVPB      nafcillin  IVPB 2 Gram(s) IV Intermittent every 4 hours  nortriptyline 25 milliGRAM(s) Oral at bedtime  pantoprazole    Tablet 40 milliGRAM(s) Oral before breakfast      LABS:  08-20    140  |  105  |  21  ----------------------------<  111<H>  3.9   |  23  |  1.02    Ca    9.0      20 Aug 2019 06:00  Phos  4.0     08-20  Mg     2.0     08-20      Creatinine Trend: 1.02 <--, 1.05 <--, 0.91 <--, 0.90 <--    Phosphorus Level, Serum: 4.0 mg/dL (08-20 @ 06:00)                              13.7   8.16  )-----------( 322      ( 20 Aug 2019 06:00 )             42.3     Urine Studies:  Urinalysis - [08-12-19 @ 21:20]      Color YELLOW / Appearance CLEAR / SG 1.033 / pH 5.5      Gluc NEGATIVE / Ketone NEGATIVE  / Bili NEGATIVE / Urobili NORMAL       Blood NEGATIVE / Protein 30 / Leuk Est NEGATIVE / Nitrite NEGATIVE      RBC 0-2 / WBC 3-5 / Hyaline NEGATIVE / Gran  / Sq Epi OCC / Non Sq Epi  / Bacteria NEGATIVE        HCV 9.89, Reactive      [08-05-19 @ 05:25]  HIV Nonreactive The HIV Ag/Ab Combo test performed screens for HIV-1 p24  antigen, antibodies to HIV-1 (group M and group O), and  antibodies to HIV-2. All specimens repeatedly reactive  will reflex to an HIV 1/2 antibody confirmation and  differentiation test. This assay detects p24 antigen which  may be present prior to the development of HIV antibodies,  therefore a reactive result with a negative HIV 1/2 AB  Confirmation should be followed up with HIV-1 RNA, HIV-2  RNA and repeat testing in 4-8 weeks. A nonreactive result  does not preclude previous exposure to or infection with  HIV-1 or HIV-2. [facility] prohibits disclosure of this  result to any unauthorized party.      [08-10-19 @ 06:00]      RADIOLOGY & ADDITIONAL STUDIES:

## 2019-08-20 NOTE — PROGRESS NOTE ADULT - ASSESSMENT
54 m with IVDA, hepatitis C (not treated), RLE ORIF and hardware, previous bacteremia and lumbar osteo? 2004, presented with worsening back pain, with difficulty ambulating due to pain.  here afebrile normal WBC  blood cx: 2/2 MSSA  hep C viral load: [id number]  spine MRI: enhancing lesion on L3 s/o occult fx or a lesion but CT negative for FX or lesion  hiv negative    MSSA bacteremia and ?L3 osteo (read as occult fx or a lesion on MRI but CT negative for FX or lesion), pt also has ORIF on the LE but no symptoms there   active heroine use  repeat cx 8/12 negative   TTE negative  repeat MRI 8/19 with progressed enhancement and edema of L3 with perivertebral soft tissue swelling/enhancement, s/o evolving osteo, no epidural abscess      * c/w Nafcillin 2 q 4  * pt can not be discharged with a line in view of active IVDA  * will have continue the course here or another rehab facility  * will do an 8 week course for L3 osteo until 10/7  * f/u with pain management

## 2019-08-20 NOTE — PROGRESS NOTE ADULT - SUBJECTIVE AND OBJECTIVE BOX
Follow Up:  MSSA bacteremia    Interval History: pt stable and afebrile, the repeat cx 8/12 negative for 24, TTE negative and repeat MRI suggestive of evolving osteo    ROS:      All other systems negative    Constitutional: no fever, no chills, no weight loss, no night sweats  Eye: no eye pain, no redness, no vision changes  ENT:  no sore throat, no rhinorrhea  Cardiovascular:  no chest pain, no palpitation  Respiratory:  no SOB, no cough  GI:  no abd pain, no vomiting, no diarrhea  urinary: no dysuria, no hematuria, no flank pain  : no penile discharge or bleeding  musculoskeletal:  severe back pain, radiating to RLE not controlled  skin:  no rash  neurology:  no headache, no seizure, no change in mental status      Allergies  No Known Allergies        ANTIMICROBIALS:  nafcillin  IVPB    nafcillin  IVPB 2 every 4 hours      OTHER MEDS:  cloNIDine 0.1 milliGRAM(s) Oral two times a day PRN  enalapril 20 milliGRAM(s) Oral every 12 hours  gabapentin 800 milliGRAM(s) Oral three times a day  hydrOXYzine hydrochloride 50 milliGRAM(s) Oral every 8 hours PRN  ketorolac   Injectable 30 milliGRAM(s) IV Push every 6 hours  lidocaine   Patch 1 Patch Transdermal daily  melatonin 6 milliGRAM(s) Oral at bedtime  multivitamin      multivitamin 1 Tablet(s) Oral daily  nortriptyline 25 milliGRAM(s) Oral at bedtime  oxyCODONE    5 mG/acetaminophen 325 mG 2 Tablet(s) Oral every 6 hours PRN  pantoprazole    Tablet 40 milliGRAM(s) Oral before breakfast  tiZANidine 4 milliGRAM(s) Oral every 6 hours PRN      Vital Signs Last 24 Hrs  T(C): 36.7 (20 Aug 2019 10:36), Max: 37.1 (19 Aug 2019 14:05)  T(F): 98.1 (20 Aug 2019 10:36), Max: 98.7 (19 Aug 2019 14:05)  HR: 91 (20 Aug 2019 10:36) (68 - 99)  BP: 146/96 (20 Aug 2019 10:36) (138/100 - 156/98)  BP(mean): --  RR: 18 (20 Aug 2019 10:36) (16 - 18)  SpO2: 100% (20 Aug 2019 10:36) (100% - 100%)    Physical Exam:  General:    NAD, non toxic  Head: atraumatic, normocephalic  Eyes: normal sclera and conjunctiva  ENT:   no oropharyngeal lesions, no LAD, neck supple  Cardio:    regular S1,S2  Respiratory:   clear b/l, no wheezing  abd:   soft, BS +, not tender, no hepatosplenomegaly  :     no CVAT, no suprapubic tenderness, no romero  Musculoskeletal : RLE scar from ORIF but no tenderness  Skin:    no rash  vascular: no phlebitis, normal pulses  Neurologic:     no focal deficits  psych: normal affect                          13.7   8.16  )-----------( 322      ( 20 Aug 2019 06:00 )             42.3       08-20    140  |  105  |  21  ----------------------------<  111<H>  3.9   |  23  |  1.02    Ca    9.0      20 Aug 2019 06:00  Phos  4.0     08-20  Mg     2.0     08-20            MICROBIOLOGY:  v  BLOOD PERIPHERAL  08-12-19 --  --  --      BLOOD VENOUS  08-10-19 --  --  --      BLOOD  08-08-19 --  --  --      BLOOD  08-07-19 --  --  Staphylococcus aureus  BLOOD CULTURE PCR                RADIOLOGY:  Images below reviewed personally  < from: MR Lumbar Spine w/wo IV Cont (08.19.19 @ 11:13) >  IMPRESSION: Redemonstrated abnormal signal involving the L3 vertebral   body with associated enhancement and edema which has gradually progressed   from the prior exams. There is a small amount of prevertebral soft tissue   swelling/enhancement. Findings favor evolving osteomyelitis.    No evidence of epidural abscess.

## 2019-08-21 LAB
ANION GAP SERPL CALC-SCNC: 14 MMO/L — SIGNIFICANT CHANGE UP (ref 7–14)
BUN SERPL-MCNC: 23 MG/DL — SIGNIFICANT CHANGE UP (ref 7–23)
CALCIUM SERPL-MCNC: 8.6 MG/DL — SIGNIFICANT CHANGE UP (ref 8.4–10.5)
CHLORIDE SERPL-SCNC: 104 MMOL/L — SIGNIFICANT CHANGE UP (ref 98–107)
CO2 SERPL-SCNC: 21 MMOL/L — LOW (ref 22–31)
CREAT SERPL-MCNC: 1.04 MG/DL — SIGNIFICANT CHANGE UP (ref 0.5–1.3)
GLUCOSE SERPL-MCNC: 145 MG/DL — HIGH (ref 70–99)
HCT VFR BLD CALC: 43 % — SIGNIFICANT CHANGE UP (ref 39–50)
HGB BLD-MCNC: 13.4 G/DL — SIGNIFICANT CHANGE UP (ref 13–17)
MAGNESIUM SERPL-MCNC: 2.1 MG/DL — SIGNIFICANT CHANGE UP (ref 1.6–2.6)
MCHC RBC-ENTMCNC: 28.8 PG — SIGNIFICANT CHANGE UP (ref 27–34)
MCHC RBC-ENTMCNC: 31.2 % — LOW (ref 32–36)
MCV RBC AUTO: 92.5 FL — SIGNIFICANT CHANGE UP (ref 80–100)
NRBC # FLD: 0 K/UL — SIGNIFICANT CHANGE UP (ref 0–0)
PHOSPHATE SERPL-MCNC: 4.1 MG/DL — SIGNIFICANT CHANGE UP (ref 2.5–4.5)
PLATELET # BLD AUTO: 284 K/UL — SIGNIFICANT CHANGE UP (ref 150–400)
PMV BLD: 9.6 FL — SIGNIFICANT CHANGE UP (ref 7–13)
POTASSIUM SERPL-MCNC: 4.5 MMOL/L — SIGNIFICANT CHANGE UP (ref 3.5–5.3)
POTASSIUM SERPL-SCNC: 4.5 MMOL/L — SIGNIFICANT CHANGE UP (ref 3.5–5.3)
RBC # BLD: 4.65 M/UL — SIGNIFICANT CHANGE UP (ref 4.2–5.8)
RBC # FLD: 15.1 % — HIGH (ref 10.3–14.5)
SODIUM SERPL-SCNC: 139 MMOL/L — SIGNIFICANT CHANGE UP (ref 135–145)
WBC # BLD: 6.03 K/UL — SIGNIFICANT CHANGE UP (ref 3.8–10.5)
WBC # FLD AUTO: 6.03 K/UL — SIGNIFICANT CHANGE UP (ref 3.8–10.5)

## 2019-08-21 PROCEDURE — 99232 SBSQ HOSP IP/OBS MODERATE 35: CPT

## 2019-08-21 RX ORDER — KETOROLAC TROMETHAMINE 30 MG/ML
30 SYRINGE (ML) INJECTION EVERY 6 HOURS
Refills: 0 | Status: DISCONTINUED | OUTPATIENT
Start: 2019-08-21 | End: 2019-08-23

## 2019-08-21 RX ADMIN — SODIUM CHLORIDE 70 MILLILITER(S): 9 INJECTION INTRAMUSCULAR; INTRAVENOUS; SUBCUTANEOUS at 09:03

## 2019-08-21 RX ADMIN — NAFCILLIN 200 GRAM(S): 10 INJECTION, POWDER, FOR SOLUTION INTRAVENOUS at 18:02

## 2019-08-21 RX ADMIN — TIZANIDINE 4 MILLIGRAM(S): 4 TABLET ORAL at 15:11

## 2019-08-21 RX ADMIN — OXYCODONE AND ACETAMINOPHEN 2 TABLET(S): 5; 325 TABLET ORAL at 13:02

## 2019-08-21 RX ADMIN — NAFCILLIN 200 GRAM(S): 10 INJECTION, POWDER, FOR SOLUTION INTRAVENOUS at 22:30

## 2019-08-21 RX ADMIN — Medication 1 TABLET(S): at 13:07

## 2019-08-21 RX ADMIN — TIZANIDINE 4 MILLIGRAM(S): 4 TABLET ORAL at 21:29

## 2019-08-21 RX ADMIN — GABAPENTIN 800 MILLIGRAM(S): 400 CAPSULE ORAL at 05:11

## 2019-08-21 RX ADMIN — OXYCODONE AND ACETAMINOPHEN 2 TABLET(S): 5; 325 TABLET ORAL at 13:45

## 2019-08-21 RX ADMIN — Medication 30 MILLIGRAM(S): at 15:21

## 2019-08-21 RX ADMIN — NAFCILLIN 200 GRAM(S): 10 INJECTION, POWDER, FOR SOLUTION INTRAVENOUS at 09:43

## 2019-08-21 RX ADMIN — OXYCODONE AND ACETAMINOPHEN 2 TABLET(S): 5; 325 TABLET ORAL at 19:02

## 2019-08-21 RX ADMIN — OXYCODONE AND ACETAMINOPHEN 2 TABLET(S): 5; 325 TABLET ORAL at 07:30

## 2019-08-21 RX ADMIN — Medication 6 MILLIGRAM(S): at 21:28

## 2019-08-21 RX ADMIN — Medication 30 MILLIGRAM(S): at 04:10

## 2019-08-21 RX ADMIN — Medication 30 MILLIGRAM(S): at 03:36

## 2019-08-21 RX ADMIN — OXYCODONE AND ACETAMINOPHEN 2 TABLET(S): 5; 325 TABLET ORAL at 00:27

## 2019-08-21 RX ADMIN — GABAPENTIN 800 MILLIGRAM(S): 400 CAPSULE ORAL at 13:07

## 2019-08-21 RX ADMIN — NAFCILLIN 200 GRAM(S): 10 INJECTION, POWDER, FOR SOLUTION INTRAVENOUS at 01:34

## 2019-08-21 RX ADMIN — NAFCILLIN 200 GRAM(S): 10 INJECTION, POWDER, FOR SOLUTION INTRAVENOUS at 13:07

## 2019-08-21 RX ADMIN — Medication 20 MILLIGRAM(S): at 05:11

## 2019-08-21 RX ADMIN — OXYCODONE AND ACETAMINOPHEN 2 TABLET(S): 5; 325 TABLET ORAL at 06:53

## 2019-08-21 RX ADMIN — Medication 30 MILLIGRAM(S): at 09:03

## 2019-08-21 RX ADMIN — OXYCODONE AND ACETAMINOPHEN 2 TABLET(S): 5; 325 TABLET ORAL at 01:00

## 2019-08-21 RX ADMIN — NAFCILLIN 200 GRAM(S): 10 INJECTION, POWDER, FOR SOLUTION INTRAVENOUS at 05:10

## 2019-08-21 RX ADMIN — Medication 30 MILLIGRAM(S): at 21:48

## 2019-08-21 RX ADMIN — OXYCODONE AND ACETAMINOPHEN 2 TABLET(S): 5; 325 TABLET ORAL at 20:00

## 2019-08-21 RX ADMIN — TIZANIDINE 4 MILLIGRAM(S): 4 TABLET ORAL at 06:25

## 2019-08-21 RX ADMIN — Medication 20 MILLIGRAM(S): at 18:02

## 2019-08-21 RX ADMIN — Medication 30 MILLIGRAM(S): at 21:28

## 2019-08-21 RX ADMIN — GABAPENTIN 800 MILLIGRAM(S): 400 CAPSULE ORAL at 21:28

## 2019-08-21 NOTE — PROGRESS NOTE ADULT - ASSESSMENT
54 m with IVDA, hepatitis C (not treated), RLE ORIF and hardware, previous bacteremia and lumbar osteo? 2004, presented with worsening back pain, with difficulty ambulating due to pain.  here afebrile normal WBC  blood cx: 2/2 MSSA  hep C viral load: [id number]  spine MRI: enhancing lesion on L3 s/o occult fx or a lesion but CT negative for FX or lesion  hiv negative    MSSA bacteremia and ?L3 osteo (read as occult fx or a lesion on MRI but CT negative for FX or lesion), pt also has ORIF on the LE but no symptoms there   active heroine use  repeat cx 8/12 negative   TTE negative  repeat MRI 8/19 with progressed enhancement and edema of L3 with perivertebral soft tissue swelling/enhancement, s/o evolving osteo, no epidural abscess      * c/w Nafcillin 2 q 4  * pt can not be discharged with a line in view of active IVDA  * will have continue the course here or another rehab facility  * will do an 8 week course for L3 osteo until 10/7  * f/u with pain management  * will need weekly CBC, CMP, ESR and CRP

## 2019-08-21 NOTE — PROGRESS NOTE ADULT - SUBJECTIVE AND OBJECTIVE BOX
[facility]   MD JEYSON GALAN DO ANGELA WONG, PA    TEL:  OFFICE: [phone]  DR FORREST CELL: [phone]  DR. OCHOA CELL: [phone]  MARAL CHUN CELL: [phone]        Patient is a 54y old  Male who presents with a chief complaint of back pain and sciatica (21 Aug 2019 09:01)      Patient seen and examined at bedside. No chest pain/sob    VITALS:  T(F): 97.9 (08-21-19 @ 05:10), Max: 98.1 (08-20-19 @ 18:18)  HR: 67 (08-21-19 @ 05:10)  BP: 122/92 (08-21-19 @ 05:10)  RR: 18 (08-21-19 @ 05:10)  SpO2: 100% (08-21-19 @ 05:10)  Wt(kg): --    08-20 @ 07:01  -  08-21 @ 07:00  --------------------------------------------------------  IN: 0 mL / OUT: 400 mL / NET: -400 mL    08-21 @ 07:01  -  08-21 @ 12:16  --------------------------------------------------------  IN: 480 mL / OUT: 500 mL / NET: -20 mL          PHYSICAL EXAM:  Constitutional: NAD  Neck: No JVD  Respiratory: CTAB, no wheezes, rales or rhonchi  Cardiovascular: S1, S2, RRR  Gastrointestinal: BS+, soft, NT/ND  Extremities: No peripheral edema    Hospital Medications:   MEDICATIONS  (STANDING):  enalapril 20 milliGRAM(s) Oral every 12 hours  gabapentin 800 milliGRAM(s) Oral three times a day  ketorolac   Injectable 30 milliGRAM(s) IV Push every 6 hours  lidocaine   Patch 1 Patch Transdermal daily  melatonin 6 milliGRAM(s) Oral at bedtime  multivitamin      multivitamin 1 Tablet(s) Oral daily  nafcillin  IVPB      nafcillin  IVPB 2 Gram(s) IV Intermittent every 4 hours  nortriptyline 25 milliGRAM(s) Oral at bedtime  pantoprazole    Tablet 40 milliGRAM(s) Oral before breakfast  sodium chloride 0.9%. 1000 milliLiter(s) (70 mL/Hr) IV Continuous <Continuous>      LABS:  08-21    139  |  104  |  23  ----------------------------<  145<H>  4.5   |  21<L>  |  1.04    Ca    8.6      21 Aug 2019 06:25  Phos  4.1     08-21  Mg     2.1     08-21      Creatinine Trend: 1.04 <--, 1.02 <--, 1.05 <--, 0.91 <--, 0.90 <--    Phosphorus Level, Serum: 4.1 mg/dL (08-21 @ 06:25)                              13.4   6.03  )-----------( 284      ( 21 Aug 2019 06:25 )             43.0     Urine Studies:  Urinalysis - [08-12-19 @ 21:20]      Color YELLOW / Appearance CLEAR / SG 1.033 / pH 5.5      Gluc NEGATIVE / Ketone NEGATIVE  / Bili NEGATIVE / Urobili NORMAL       Blood NEGATIVE / Protein 30 / Leuk Est NEGATIVE / Nitrite NEGATIVE      RBC 0-2 / WBC 3-5 / Hyaline NEGATIVE / Gran  / Sq Epi OCC / Non Sq Epi  / Bacteria NEGATIVE        HCV 9.89, Reactive      [08-05-19 @ 05:25]  HIV Nonreactive The HIV Ag/Ab Combo test performed screens for HIV-1 p24  antigen, antibodies to HIV-1 (group M and group O), and  antibodies to HIV-2. All specimens repeatedly reactive  will reflex to an HIV 1/2 antibody confirmation and  differentiation test. This assay detects p24 antigen which  may be present prior to the development of HIV antibodies,  therefore a reactive result with a negative HIV 1/2 AB  Confirmation should be followed up with HIV-1 RNA, HIV-2  RNA and repeat testing in 4-8 weeks. A nonreactive result  does not preclude previous exposure to or infection with  HIV-1 or HIV-2. [facility] prohibits disclosure of this  result to any unauthorized party.      [08-10-19 @ 06:00]      RADIOLOGY & ADDITIONAL STUDIES:

## 2019-08-21 NOTE — PROGRESS NOTE ADULT - SUBJECTIVE AND OBJECTIVE BOX
MARCIOLUZ OBRIEN  54y  Male      Patient is a 54y old  Male who presents with a chief complaint of back pain and sciatica (19 Aug 2019 16:32)  c/o back pain,mod-severe.current pain management not enough?,no sob,no cp,no fever    REVIEW OF SYSTEMS:  as above    MEDICATIONS  (STANDING):  enalapril 20 milliGRAM(s) Oral every 12 hours  gabapentin 800 milliGRAM(s) Oral three times a day  ketorolac   Injectable 30 milliGRAM(s) IV Push every 6 hours  lidocaine   Patch 1 Patch Transdermal daily  melatonin 6 milliGRAM(s) Oral at bedtime  multivitamin      multivitamin 1 Tablet(s) Oral daily  nafcillin  IVPB      nafcillin  IVPB 2 Gram(s) IV Intermittent every 4 hours  nortriptyline 25 milliGRAM(s) Oral at bedtime  pantoprazole    Tablet 40 milliGRAM(s) Oral before breakfast    MEDICATIONS  (PRN):  cloNIDine 0.1 milliGRAM(s) Oral two times a day PRN withdrawl symptoms  hydrOXYzine hydrochloride 50 milliGRAM(s) Oral every 8 hours PRN Anxiety  oxyCODONE    5 mG/acetaminophen 325 mG 2 Tablet(s) Oral every 6 hours PRN Severe Pain (7 - 10)  tiZANidine 4 milliGRAM(s) Oral every 6 hours PRN Muscle Spasm    Vital Signs Last 24 Hrs  T(C): 37.3 (21 Aug 2019 21:35), Max: 37.3 (21 Aug 2019 21:35)  T(F): 99.2 (21 Aug 2019 21:35), Max: 99.2 (21 Aug 2019 21:35)  HR: 71 (21 Aug 2019 21:35) (66 - 71)  BP: 148/96 (21 Aug 2019 22:45) (122/92 - 149/96)  BP(mean): --  RR: 19 (21 Aug 2019 21:35) (18 - 19)  SpO2: 98% (21 Aug 2019 21:35) (98% - 100%)    PHYSICAL EXAM:  GENERAL: NAD, well-groomed, well-developed  HEAD:  Atraumatic, Normocephalic  EYES: EOMI, PERRLA, conjunctiva and sclera clear  ENMT: No tonsillar erythema, exudates, or enlargement; Moist mucous membranes, Good dentition, No lesions  NECK: Supple, No JVD, Normal thyroid  NERVOUS SYSTEM:  Alert & Oriented X3, Good concentration; Motor Strength 5/5 B/L upper and lower extremities; DTRs 2+ intact and symmetric  CHEST/LUNG: Clear to percussion bilaterally; No rales, rhonchi, wheezing, or rubs  HEART: Regular rate and rhythm; No murmurs, rubs, or gallops  ABDOMEN: Soft, Nontender, Nondistended; Bowel sounds present  EXTREMITIES:  2+ Peripheral Pulses, No clubbing, cyanosis, or edema.Back-no tenderness  LYMPH: No lymphadenopathy noted  SKIN: No rashes or lesions    LABS:  08-21    139  |  104  |  23  ----------------------------<  145<H>  4.5   |  21<L>  |  1.04    Ca    8.6      21 Aug 2019 06:25  Phos  4.1     08-21  Mg     2.1     08-21      Creatinine Trend: 1.04 <--, 1.02 <--, 1.05 <--, 0.91 <--, 0.90 <--                        13.4   6.03  )-----------( 284      ( 21 Aug 2019 06:25 )             43.0     Urine Studies:

## 2019-08-21 NOTE — PROGRESS NOTE ADULT - SUBJECTIVE AND OBJECTIVE BOX
LUZ BUCKLEY:[id number],   54yMale followed for:  No Known Allergies    PAST MEDICAL & SURGICAL HISTORY:  Sciatica  S/P ORIF (open reduction internal fixation) fracture    FAMILY HISTORY:    MEDICATIONS  (STANDING):  enalapril 20 milliGRAM(s) Oral every 12 hours  gabapentin 800 milliGRAM(s) Oral three times a day  ketorolac   Injectable 30 milliGRAM(s) IV Push every 6 hours  lidocaine   Patch 1 Patch Transdermal daily  melatonin 6 milliGRAM(s) Oral at bedtime  multivitamin      multivitamin 1 Tablet(s) Oral daily  nafcillin  IVPB      nafcillin  IVPB 2 Gram(s) IV Intermittent every 4 hours  nortriptyline 25 milliGRAM(s) Oral at bedtime  pantoprazole    Tablet 40 milliGRAM(s) Oral before breakfast  sodium chloride 0.9%. 1000 milliLiter(s) (70 mL/Hr) IV Continuous <Continuous>    MEDICATIONS  (PRN):  cloNIDine 0.1 milliGRAM(s) Oral two times a day PRN withdrawl symptoms  hydrOXYzine hydrochloride 50 milliGRAM(s) Oral every 8 hours PRN Anxiety  oxyCODONE    5 mG/acetaminophen 325 mG 2 Tablet(s) Oral every 6 hours PRN Severe Pain (7 - 10)  tiZANidine 4 milliGRAM(s) Oral every 6 hours PRN Muscle Spasm      Vital Signs Last 24 Hrs  T(C): 36.6 (21 Aug 2019 05:10), Max: 36.7 (20 Aug 2019 10:36)  T(F): 97.9 (21 Aug 2019 05:10), Max: 98.1 (20 Aug 2019 10:36)  HR: 67 (21 Aug 2019 05:10) (64 - 91)  BP: 122/92 (21 Aug 2019 05:10) (122/92 - 146/96)  BP(mean): --  RR: 18 (21 Aug 2019 05:10) (17 - 18)  SpO2: 100% (21 Aug 2019 05:10) (99% - 100%)  nc/at  s1s2  cta  soft, nt, nd no guarding or rebound  no c/c/e    CBC Full  -  ( 21 Aug 2019 06:25 )  WBC Count : 6.03 K/uL  RBC Count : 4.65 M/uL  Hemoglobin : 13.4 g/dL  Hematocrit : 43.0 %  Platelet Count - Automated : 284 K/uL  Mean Cell Volume : 92.5 fL  Mean Cell Hemoglobin : 28.8 pg  Mean Cell Hemoglobin Concentration : 31.2 %  Auto Neutrophil # : x  Auto Lymphocyte # : x  Auto Monocyte # : x  Auto Eosinophil # : x  Auto Basophil # : x  Auto Neutrophil % : x  Auto Lymphocyte % : x  Auto Monocyte % : x  Auto Eosinophil % : x  Auto Basophil % : x    08-21    139  |  104  |  23  ----------------------------<  145<H>  4.5   |  21<L>  |  1.04    Ca    8.6      21 Aug 2019 06:25  Phos  4.1     08-21  Mg     2.1     08-21

## 2019-08-21 NOTE — PROGRESS NOTE ADULT - SUBJECTIVE AND OBJECTIVE BOX
Follow Up:  MSSA bacteremia    Interval History: pt stable and afebrile, no acute events    ROS:      All other systems negative    Constitutional: no fever, no chills, no weight loss, no night sweats  Eye: no eye pain, no redness, no vision changes  ENT:  no sore throat, no rhinorrhea  Cardiovascular:  no chest pain, no palpitation  Respiratory:  no SOB, no cough  GI:  no abd pain, no vomiting, no diarrhea  urinary: no dysuria, no hematuria, no flank pain  : no penile discharge or bleeding  musculoskeletal:  severe back pain  skin:  no rash  neurology:  no headache, no seizure, no change in mental status        Allergies  No Known Allergies        ANTIMICROBIALS:  nafcillin  IVPB    nafcillin  IVPB 2 every 4 hours      OTHER MEDS:  cloNIDine 0.1 milliGRAM(s) Oral two times a day PRN  enalapril 20 milliGRAM(s) Oral every 12 hours  gabapentin 800 milliGRAM(s) Oral three times a day  hydrOXYzine hydrochloride 50 milliGRAM(s) Oral every 8 hours PRN  ketorolac   Injectable 30 milliGRAM(s) IV Push every 6 hours  lidocaine   Patch 1 Patch Transdermal daily  melatonin 6 milliGRAM(s) Oral at bedtime  multivitamin      multivitamin 1 Tablet(s) Oral daily  nortriptyline 25 milliGRAM(s) Oral at bedtime  oxyCODONE    5 mG/acetaminophen 325 mG 2 Tablet(s) Oral every 6 hours PRN  pantoprazole    Tablet 40 milliGRAM(s) Oral before breakfast  tiZANidine 4 milliGRAM(s) Oral every 6 hours PRN      Vital Signs Last 24 Hrs  T(C): 37 (21 Aug 2019 13:31), Max: 37 (21 Aug 2019 13:31)  T(F): 98.6 (21 Aug 2019 13:31), Max: 98.6 (21 Aug 2019 13:31)  HR: 66 (21 Aug 2019 13:31) (64 - 67)  BP: 149/96 (21 Aug 2019 13:31) (122/92 - 149/96)  BP(mean): --  RR: 19 (21 Aug 2019 13:31) (17 - 19)  SpO2: 100% (21 Aug 2019 13:31) (99% - 100%)    Physical Exam:  General:    NAD, non toxic  Head: atraumatic, normocephalic  Eyes: normal sclera and conjunctiva  ENT:   no oropharyngeal lesions, no LAD, neck supple  Cardio:    regular S1,S2  Respiratory:   clear b/l, no wheezing  abd:   soft, BS +, not tender, no hepatosplenomegaly  :     no CVAT, no suprapubic tenderness, no romero  Musculoskeletal : RLE scar from ORIF but no tenderness  Skin:    no rash  vascular: no phlebitis, normal pulses  Neurologic:     no focal deficits  psych: normal affect                            13.4   6.03  )-----------( 284      ( 21 Aug 2019 06:25 )             43.0       08-21    139  |  104  |  23  ----------------------------<  145<H>  4.5   |  21<L>  |  1.04    Ca    8.6      21 Aug 2019 06:25  Phos  4.1     08-21  Mg     2.1     08-21            MICROBIOLOGY:  v  BLOOD PERIPHERAL  08-12-19 --  --  --      BLOOD VENOUS  08-10-19 --  --  --      BLOOD  08-08-19 --  --  --      BLOOD  08-07-19 --  --  Staphylococcus aureus  BLOOD CULTURE PCR                RADIOLOGY:  Images below reviewed personally  < from: MR Lumbar Spine w/wo IV Cont (08.19.19 @ 11:13) >  IMPRESSION: Redemonstrated abnormal signal involving the L3 vertebral   body with associated enhancement and edema which has gradually progressed   from the prior exams. There is a small amount of prevertebral soft tissue   swelling/enhancement. Findings favor evolving osteomyelitis.    No evidence of epidural abscess.

## 2019-08-21 NOTE — PROGRESS NOTE ADULT - ASSESSMENT
55 yo male active heroin use with chronic back pain, kidney cyst a/w acute on chronic back pain with sciatica with difficulty ambulating due to pain 8/4. patient found to have s. aureus bacteremia on cefazolin iv. nephrology consulted for ARNOLDO.     ARNOLDO  peaked 1.79   ARNOLDO likely prerenal on NSAIDs and lisinopril  renal function improved with IVF  off ibuprofen.   renal us done showed left renal cyst  currently on enalapril 20 bid and ketorolac iv for pain  on IVF  monitor bmp, urine output     HTN  controlled  monitor bmp and bp    Hep C  f/u GI  outpatient treatment    Proteinuria  mild  p/c ratio <300, Monitor  hep c +

## 2019-08-22 LAB
ANION GAP SERPL CALC-SCNC: 12 MMO/L — SIGNIFICANT CHANGE UP (ref 7–14)
BUN SERPL-MCNC: 21 MG/DL — SIGNIFICANT CHANGE UP (ref 7–23)
CALCIUM SERPL-MCNC: 9.2 MG/DL — SIGNIFICANT CHANGE UP (ref 8.4–10.5)
CHLORIDE SERPL-SCNC: 103 MMOL/L — SIGNIFICANT CHANGE UP (ref 98–107)
CO2 SERPL-SCNC: 25 MMOL/L — SIGNIFICANT CHANGE UP (ref 22–31)
CREAT SERPL-MCNC: 1.11 MG/DL — SIGNIFICANT CHANGE UP (ref 0.5–1.3)
GLUCOSE SERPL-MCNC: 107 MG/DL — HIGH (ref 70–99)
HCT VFR BLD CALC: 45.2 % — SIGNIFICANT CHANGE UP (ref 39–50)
HGB BLD-MCNC: 14.4 G/DL — SIGNIFICANT CHANGE UP (ref 13–17)
MAGNESIUM SERPL-MCNC: 2.3 MG/DL — SIGNIFICANT CHANGE UP (ref 1.6–2.6)
MCHC RBC-ENTMCNC: 29.8 PG — SIGNIFICANT CHANGE UP (ref 27–34)
MCHC RBC-ENTMCNC: 31.9 % — LOW (ref 32–36)
MCV RBC AUTO: 93.6 FL — SIGNIFICANT CHANGE UP (ref 80–100)
NRBC # FLD: 0 K/UL — SIGNIFICANT CHANGE UP (ref 0–0)
PHOSPHATE SERPL-MCNC: 4.3 MG/DL — SIGNIFICANT CHANGE UP (ref 2.5–4.5)
PLATELET # BLD AUTO: 311 K/UL — SIGNIFICANT CHANGE UP (ref 150–400)
PMV BLD: 9.8 FL — SIGNIFICANT CHANGE UP (ref 7–13)
POTASSIUM SERPL-MCNC: 4.9 MMOL/L — SIGNIFICANT CHANGE UP (ref 3.5–5.3)
POTASSIUM SERPL-SCNC: 4.9 MMOL/L — SIGNIFICANT CHANGE UP (ref 3.5–5.3)
RBC # BLD: 4.83 M/UL — SIGNIFICANT CHANGE UP (ref 4.2–5.8)
RBC # FLD: 15.4 % — HIGH (ref 10.3–14.5)
SODIUM SERPL-SCNC: 140 MMOL/L — SIGNIFICANT CHANGE UP (ref 135–145)
WBC # BLD: 7.32 K/UL — SIGNIFICANT CHANGE UP (ref 3.8–10.5)
WBC # FLD AUTO: 7.32 K/UL — SIGNIFICANT CHANGE UP (ref 3.8–10.5)

## 2019-08-22 PROCEDURE — 99232 SBSQ HOSP IP/OBS MODERATE 35: CPT

## 2019-08-22 RX ORDER — OXYCODONE AND ACETAMINOPHEN 5; 325 MG/1; MG/1
2 TABLET ORAL EVERY 6 HOURS
Refills: 0 | Status: DISCONTINUED | OUTPATIENT
Start: 2019-08-22 | End: 2019-08-26

## 2019-08-22 RX ORDER — CHLORHEXIDINE GLUCONATE 213 G/1000ML
1 SOLUTION TOPICAL
Refills: 0 | Status: DISCONTINUED | OUTPATIENT
Start: 2019-08-22 | End: 2019-08-28

## 2019-08-22 RX ADMIN — NAFCILLIN 200 GRAM(S): 10 INJECTION, POWDER, FOR SOLUTION INTRAVENOUS at 01:32

## 2019-08-22 RX ADMIN — Medication 1 TABLET(S): at 13:07

## 2019-08-22 RX ADMIN — OXYCODONE AND ACETAMINOPHEN 2 TABLET(S): 5; 325 TABLET ORAL at 02:10

## 2019-08-22 RX ADMIN — GABAPENTIN 800 MILLIGRAM(S): 400 CAPSULE ORAL at 14:11

## 2019-08-22 RX ADMIN — OXYCODONE AND ACETAMINOPHEN 2 TABLET(S): 5; 325 TABLET ORAL at 07:59

## 2019-08-22 RX ADMIN — OXYCODONE AND ACETAMINOPHEN 2 TABLET(S): 5; 325 TABLET ORAL at 21:10

## 2019-08-22 RX ADMIN — Medication 30 MILLIGRAM(S): at 22:55

## 2019-08-22 RX ADMIN — Medication 20 MILLIGRAM(S): at 06:22

## 2019-08-22 RX ADMIN — NAFCILLIN 200 GRAM(S): 10 INJECTION, POWDER, FOR SOLUTION INTRAVENOUS at 22:40

## 2019-08-22 RX ADMIN — Medication 30 MILLIGRAM(S): at 03:18

## 2019-08-22 RX ADMIN — OXYCODONE AND ACETAMINOPHEN 2 TABLET(S): 5; 325 TABLET ORAL at 20:14

## 2019-08-22 RX ADMIN — Medication 1 TABLET(S): at 06:23

## 2019-08-22 RX ADMIN — GABAPENTIN 800 MILLIGRAM(S): 400 CAPSULE ORAL at 22:40

## 2019-08-22 RX ADMIN — OXYCODONE AND ACETAMINOPHEN 2 TABLET(S): 5; 325 TABLET ORAL at 08:55

## 2019-08-22 RX ADMIN — NAFCILLIN 200 GRAM(S): 10 INJECTION, POWDER, FOR SOLUTION INTRAVENOUS at 14:12

## 2019-08-22 RX ADMIN — Medication 30 MILLIGRAM(S): at 16:11

## 2019-08-22 RX ADMIN — OXYCODONE AND ACETAMINOPHEN 2 TABLET(S): 5; 325 TABLET ORAL at 15:00

## 2019-08-22 RX ADMIN — Medication 30 MILLIGRAM(S): at 03:33

## 2019-08-22 RX ADMIN — TIZANIDINE 4 MILLIGRAM(S): 4 TABLET ORAL at 04:35

## 2019-08-22 RX ADMIN — Medication 30 MILLIGRAM(S): at 17:00

## 2019-08-22 RX ADMIN — NAFCILLIN 200 GRAM(S): 10 INJECTION, POWDER, FOR SOLUTION INTRAVENOUS at 10:13

## 2019-08-22 RX ADMIN — NAFCILLIN 200 GRAM(S): 10 INJECTION, POWDER, FOR SOLUTION INTRAVENOUS at 06:22

## 2019-08-22 RX ADMIN — Medication 20 MILLIGRAM(S): at 18:18

## 2019-08-22 RX ADMIN — Medication 6 MILLIGRAM(S): at 22:40

## 2019-08-22 RX ADMIN — Medication 30 MILLIGRAM(S): at 11:00

## 2019-08-22 RX ADMIN — OXYCODONE AND ACETAMINOPHEN 2 TABLET(S): 5; 325 TABLET ORAL at 01:12

## 2019-08-22 RX ADMIN — NAFCILLIN 200 GRAM(S): 10 INJECTION, POWDER, FOR SOLUTION INTRAVENOUS at 18:19

## 2019-08-22 RX ADMIN — Medication 30 MILLIGRAM(S): at 22:40

## 2019-08-22 RX ADMIN — OXYCODONE AND ACETAMINOPHEN 2 TABLET(S): 5; 325 TABLET ORAL at 14:09

## 2019-08-22 RX ADMIN — Medication 30 MILLIGRAM(S): at 10:09

## 2019-08-22 NOTE — PROVIDER CONTACT NOTE (OTHER) - ACTION/TREATMENT ORDERED:
IV can be extended, needs to be changed later today as team does not have plan for placing a long term catheter

## 2019-08-22 NOTE — PROGRESS NOTE ADULT - SUBJECTIVE AND OBJECTIVE BOX
LUZ BUCKLEY:[id number],   54yMale followed for:  No Known Allergies    PAST MEDICAL & SURGICAL HISTORY:  Sciatica  S/P ORIF (open reduction internal fixation) fracture    FAMILY HISTORY:    MEDICATIONS  (STANDING):  enalapril 20 milliGRAM(s) Oral every 12 hours  gabapentin 800 milliGRAM(s) Oral three times a day  ketorolac   Injectable 30 milliGRAM(s) IV Push every 6 hours  lidocaine   Patch 1 Patch Transdermal daily  melatonin 6 milliGRAM(s) Oral at bedtime  multivitamin      multivitamin 1 Tablet(s) Oral daily  nafcillin  IVPB      nafcillin  IVPB 2 Gram(s) IV Intermittent every 4 hours  nortriptyline 25 milliGRAM(s) Oral at bedtime  pantoprazole    Tablet 40 milliGRAM(s) Oral before breakfast    MEDICATIONS  (PRN):  cloNIDine 0.1 milliGRAM(s) Oral two times a day PRN withdrawl symptoms  hydrOXYzine hydrochloride 50 milliGRAM(s) Oral every 8 hours PRN Anxiety  oxyCODONE    5 mG/acetaminophen 325 mG 2 Tablet(s) Oral every 6 hours PRN Severe Pain (7 - 10)  tiZANidine 4 milliGRAM(s) Oral every 6 hours PRN Muscle Spasm      Vital Signs Last 24 Hrs  T(C): 36.9 (22 Aug 2019 06:20), Max: 37.3 (21 Aug 2019 21:35)  T(F): 98.5 (22 Aug 2019 06:20), Max: 99.2 (21 Aug 2019 21:35)  HR: 70 (22 Aug 2019 06:20) (66 - 71)  BP: 133/91 (22 Aug 2019 06:20) (133/91 - 149/96)  BP(mean): --  RR: 18 (22 Aug 2019 06:20) (18 - 19)  SpO2: 99% (22 Aug 2019 06:20) (98% - 100%)  nc/at  s1s2  cta  soft, nt, nd no guarding or rebound  no c/c/e    CBC Full  -  ( 22 Aug 2019 06:00 )  WBC Count : 7.32 K/uL  RBC Count : 4.83 M/uL  Hemoglobin : 14.4 g/dL  Hematocrit : 45.2 %  Platelet Count - Automated : 311 K/uL  Mean Cell Volume : 93.6 fL  Mean Cell Hemoglobin : 29.8 pg  Mean Cell Hemoglobin Concentration : 31.9 %  Auto Neutrophil # : x  Auto Lymphocyte # : x  Auto Monocyte # : x  Auto Eosinophil # : x  Auto Basophil # : x  Auto Neutrophil % : x  Auto Lymphocyte % : x  Auto Monocyte % : x  Auto Eosinophil % : x  Auto Basophil % : x    08-22    140  |  103  |  21  ----------------------------<  107<H>  4.9   |  25  |  1.11    Ca    9.2      22 Aug 2019 06:00  Phos  4.3     08-22  Mg     2.3     08-22

## 2019-08-22 NOTE — PROGRESS NOTE ADULT - SUBJECTIVE AND OBJECTIVE BOX
Follow Up:  MSSA bacteremia    Interval History: pt stable and afebrile, no acute events    ROS:      All other systems negative    Constitutional: no fever, no chills, no weight loss, no night sweats  Eye: no eye pain, no redness, no vision changes  ENT:  no sore throat, no rhinorrhea  Cardiovascular:  no chest pain, no palpitation  Respiratory:  no SOB, no cough  GI:  no abd pain, no vomiting, no diarrhea  urinary: no dysuria, no hematuria, no flank pain  : no penile discharge or bleeding  musculoskeletal:  severe back pain  skin:  no rash  neurology:  no headache, no seizure, no change in mental status          Allergies  No Known Allergies        ANTIMICROBIALS:  nafcillin  IVPB    nafcillin  IVPB 2 every 4 hours      OTHER MEDS:  cloNIDine 0.1 milliGRAM(s) Oral two times a day PRN  enalapril 20 milliGRAM(s) Oral every 12 hours  gabapentin 800 milliGRAM(s) Oral three times a day  hydrOXYzine hydrochloride 50 milliGRAM(s) Oral every 8 hours PRN  ketorolac   Injectable 30 milliGRAM(s) IV Push every 6 hours  lidocaine   Patch 1 Patch Transdermal daily  melatonin 6 milliGRAM(s) Oral at bedtime  multivitamin      multivitamin 1 Tablet(s) Oral daily  nortriptyline 25 milliGRAM(s) Oral at bedtime  oxyCODONE    5 mG/acetaminophen 325 mG 2 Tablet(s) Oral every 6 hours PRN  pantoprazole    Tablet 40 milliGRAM(s) Oral before breakfast  tiZANidine 4 milliGRAM(s) Oral every 6 hours PRN      Vital Signs Last 24 Hrs  T(C): 36.6 (22 Aug 2019 10:47), Max: 37.3 (21 Aug 2019 21:35)  T(F): 97.9 (22 Aug 2019 10:47), Max: 99.2 (21 Aug 2019 21:35)  HR: 71 (22 Aug 2019 10:47) (70 - 71)  BP: 135/91 (22 Aug 2019 10:47) (133/91 - 148/96)  BP(mean): --  RR: 17 (22 Aug 2019 10:47) (17 - 19)  SpO2: 100% (22 Aug 2019 10:47) (98% - 100%)    Physical Exam:  General:    NAD, non toxic  Head: atraumatic, normocephalic  Eyes: normal sclera and conjunctiva  ENT:   no oropharyngeal lesions, no LAD, neck supple  Cardio:    regular S1,S2  Respiratory:   clear b/l, no wheezing  abd:   soft, BS +, not tender, no hepatosplenomegaly  :     no CVAT, no suprapubic tenderness, no romero  Musculoskeletal : RLE scar from ORIF but no tenderness  Skin:    no rash  vascular: no phlebitis, normal pulses  Neurologic:     no focal deficits  psych: normal affect                            14.4   7.32  )-----------( 311      ( 22 Aug 2019 06:00 )             45.2       08-22    140  |  103  |  21  ----------------------------<  107<H>  4.9   |  25  |  1.11    Ca    9.2      22 Aug 2019 06:00  Phos  4.3     08-22  Mg     2.3     08-22            MICROBIOLOGY:  v  BLOOD PERIPHERAL  08-12-19 --  --  --      BLOOD VENOUS  08-10-19 --  --  --      BLOOD  08-08-19 --  --  --      BLOOD  08-07-19 --  --  Staphylococcus aureus  BLOOD CULTURE PCR                RADIOLOGY:  Images below reviewed personally  < from: MR Lumbar Spine w/wo IV Cont (08.19.19 @ 11:13) >    IMPRESSION: Redemonstrated abnormal signal involving the L3 vertebral   body with associated enhancement and edema which has gradually progressed   from the prior exams. There is a small amount of prevertebral soft tissue   swelling/enhancement. Findings favor evolving osteomyelitis.    No evidence of epidural abscess.

## 2019-08-22 NOTE — PROGRESS NOTE ADULT - SUBJECTIVE AND OBJECTIVE BOX
MARCIO LUZ  54y  Male      Patient is a 54y old  Male who presents with a chief complaint of back pain and sciatica (19 Aug 2019 16:32)  c/o back pain,mod-severe.current pain management not enough?,no sob,no cp,no fever    MEDICATIONS  (STANDING):  chlorhexidine 4% Liquid 1 Application(s) Topical <User Schedule>  enalapril 20 milliGRAM(s) Oral every 12 hours  gabapentin 800 milliGRAM(s) Oral three times a day  ketorolac   Injectable 30 milliGRAM(s) IV Push every 6 hours  lidocaine   Patch 1 Patch Transdermal daily  melatonin 6 milliGRAM(s) Oral at bedtime  multivitamin      multivitamin 1 Tablet(s) Oral daily  nafcillin  IVPB      nafcillin  IVPB 2 Gram(s) IV Intermittent every 4 hours  nortriptyline 25 milliGRAM(s) Oral at bedtime  pantoprazole    Tablet 40 milliGRAM(s) Oral before breakfast    MEDICATIONS  (PRN):  cloNIDine 0.1 milliGRAM(s) Oral two times a day PRN withdrawl symptoms  hydrOXYzine hydrochloride 50 milliGRAM(s) Oral every 8 hours PRN Anxiety  oxyCODONE    5 mG/acetaminophen 325 mG 2 Tablet(s) Oral every 6 hours PRN Severe Pain (7 - 10)  tiZANidine 4 milliGRAM(s) Oral every 6 hours PRN Muscle Spasm    Vital Signs Last 24 Hrs  T(C): 37.1 (22 Aug 2019 22:14), Max: 37.1 (22 Aug 2019 22:14)  T(F): 98.8 (22 Aug 2019 22:14), Max: 98.8 (22 Aug 2019 22:14)  HR: 80 (22 Aug 2019 22:14) (70 - 80)  BP: 144/105 (22 Aug 2019 22:14) (133/91 - 144/106)  BP(mean): --  RR: 18 (22 Aug 2019 22:14) (17 - 18)  SpO2: 99% (22 Aug 2019 22:14) (99% - 100%)    PHYSICAL EXAM:  GENERAL: NAD, well-groomed, well-developed  HEAD:  Atraumatic, Normocephalic  EYES: EOMI, PERRLA, conjunctiva and sclera clear  ENMT: No tonsillar erythema, exudates, or enlargement; Moist mucous membranes, Good dentition, No lesions  NECK: Supple, No JVD, Normal thyroid  NERVOUS SYSTEM:  Alert & Oriented X3, Good concentration; Motor Strength 5/5 B/L upper and lower extremities; DTRs 2+ intact and symmetric  CHEST/LUNG: Clear to percussion bilaterally; No rales, rhonchi, wheezing, or rubs  HEART: Regular rate and rhythm; No murmurs, rubs, or gallops  ABDOMEN: Soft, Nontender, Nondistended; Bowel sounds present  EXTREMITIES:  2+ Peripheral Pulses, No clubbing, cyanosis, or edema.Back-no tenderness  LYMPH: No lymphadenopathy noted  SKIN: No rashes or lesions    LABS:  08-22    140  |  103  |  21  ----------------------------<  107<H>  4.9   |  25  |  1.11    Ca    9.2      22 Aug 2019 06:00  Phos  4.3     08-22  Mg     2.3     08-22      Creatinine Trend: 1.11 <--, 1.04 <--, 1.02 <--, 1.05 <--, 0.91 <--                        14.4   7.32  )-----------( 311      ( 22 Aug 2019 06:00 )             45.2     Urine Studies:

## 2019-08-22 NOTE — PROGRESS NOTE ADULT - ASSESSMENT
54 m with IVDA, hepatitis C (not treated), RLE ORIF and hardware, previous bacteremia and lumbar osteo? 2004, presented with worsening back pain, with difficulty ambulating due to pain.  here afebrile normal WBC  blood cx: 2/2 MSSA  hep C viral load: [id number]  spine MRI: enhancing lesion on L3 s/o occult fx or a lesion but CT negative for FX or lesion  hiv negative    MSSA bacteremia and ?L3 osteo (read as occult fx or a lesion on MRI but CT negative for FX or lesion), pt also has ORIF on the LE but no symptoms there   active heroine use  repeat cx 8/12 negative   TTE negative  repeat MRI 8/19 with progressed enhancement and edema of L3 with perivertebral soft tissue swelling/enhancement, s/o evolving osteo, no epidural abscess      * c/w Nafcillin 2 q 4  * pt can not be discharged with a line in view of active IVDA  * will have continue the course here or another rehab facility  * will do an 8 week course for L3 osteo until 10/7  * f/u with pain management  * will need weekly CBC, CMP, ESR and CRP

## 2019-08-22 NOTE — PROGRESS NOTE ADULT - SUBJECTIVE AND OBJECTIVE BOX
[facility] [place] [facility]   MD JEYSON GALAN DO ANGELA WONG, PA    TEL:  OFFICE: [phone]  DR FORREST CELL: [phone]  DR. OCHOA CELL: [phone]  MARAL CHUN CELL: [phone]        Patient is a 54y old  Male who presents with a chief complaint of back pain and sciatica (22 Aug 2019 09:00)      Patient seen and examined at bedside. No chest pain/sob    VITALS:  T(F): 97.9 (08-22-19 @ 10:47), Max: 99.2 (08-21-19 @ 21:35)  HR: 71 (08-22-19 @ 10:47)  BP: 135/91 (08-22-19 @ 10:47)  RR: 17 (08-22-19 @ 10:47)  SpO2: 100% (08-22-19 @ 10:47)  Wt(kg): --    08-21 @ 07:01  -  08-22 @ 07:00  --------------------------------------------------------  IN: 880 mL / OUT: 2400 mL / NET: -1520 mL    08-22 @ 07:01  -  08-22 @ 11:53  --------------------------------------------------------  IN: 0 mL / OUT: 500 mL / NET: -500 mL          PHYSICAL EXAM:  Constitutional: NAD  Neck: No JVD  Respiratory: CTAB, no wheezes, rales or rhonchi  Cardiovascular: S1, S2, RRR  Gastrointestinal: BS+, soft, NT/ND  Extremities: No peripheral edema    Hospital Medications:   MEDICATIONS  (STANDING):  enalapril 20 milliGRAM(s) Oral every 12 hours  gabapentin 800 milliGRAM(s) Oral three times a day  ketorolac   Injectable 30 milliGRAM(s) IV Push every 6 hours  lidocaine   Patch 1 Patch Transdermal daily  melatonin 6 milliGRAM(s) Oral at bedtime  multivitamin      multivitamin 1 Tablet(s) Oral daily  nafcillin  IVPB      nafcillin  IVPB 2 Gram(s) IV Intermittent every 4 hours  nortriptyline 25 milliGRAM(s) Oral at bedtime  pantoprazole    Tablet 40 milliGRAM(s) Oral before breakfast      LABS:  08-22    140  |  103  |  21  ----------------------------<  107<H>  4.9   |  25  |  1.11    Ca    9.2      22 Aug 2019 06:00  Phos  4.3     08-22  Mg     2.3     08-22      Creatinine Trend: 1.11 <--, 1.04 <--, 1.02 <--, 1.05 <--, 0.91 <--    Phosphorus Level, Serum: 4.3 mg/dL (08-22 @ 06:00)                              14.4   7.32  )-----------( 311      ( 22 Aug 2019 06:00 )             45.2     Urine Studies:  Urinalysis - [08-12-19 @ 21:20]      Color YELLOW / Appearance CLEAR / SG 1.033 / pH 5.5      Gluc NEGATIVE / Ketone NEGATIVE  / Bili NEGATIVE / Urobili NORMAL       Blood NEGATIVE / Protein 30 / Leuk Est NEGATIVE / Nitrite NEGATIVE      RBC 0-2 / WBC 3-5 / Hyaline NEGATIVE / Gran  / Sq Epi OCC / Non Sq Epi  / Bacteria NEGATIVE        HCV 9.89, Reactive      [08-05-19 @ 05:25]  HIV Nonreactive The HIV Ag/Ab Combo test performed screens for HIV-1 p24  antigen, antibodies to HIV-1 (group M and group O), and  antibodies to HIV-2. All specimens repeatedly reactive  will reflex to an HIV 1/2 antibody confirmation and  differentiation test. This assay detects p24 antigen which  may be present prior to the development of HIV antibodies,  therefore a reactive result with a negative HIV 1/2 AB  Confirmation should be followed up with HIV-1 RNA, HIV-2  RNA and repeat testing in 4-8 weeks. A nonreactive result  does not preclude previous exposure to or infection with  HIV-1 or HIV-2. [facility] prohibits disclosure of this  result to any unauthorized party.      [08-10-19 @ 06:00]      RADIOLOGY & ADDITIONAL STUDIES:

## 2019-08-23 PROCEDURE — 99232 SBSQ HOSP IP/OBS MODERATE 35: CPT

## 2019-08-23 RX ORDER — KETOROLAC TROMETHAMINE 30 MG/ML
30 SYRINGE (ML) INJECTION EVERY 8 HOURS
Refills: 0 | Status: DISCONTINUED | OUTPATIENT
Start: 2019-08-23 | End: 2019-08-25

## 2019-08-23 RX ORDER — NICOTINE POLACRILEX 2 MG
1 GUM BUCCAL DAILY
Refills: 0 | Status: DISCONTINUED | OUTPATIENT
Start: 2019-08-23 | End: 2019-09-06

## 2019-08-23 RX ADMIN — Medication 30 MILLIGRAM(S): at 23:03

## 2019-08-23 RX ADMIN — Medication 20 MILLIGRAM(S): at 06:30

## 2019-08-23 RX ADMIN — TIZANIDINE 4 MILLIGRAM(S): 4 TABLET ORAL at 08:44

## 2019-08-23 RX ADMIN — NAFCILLIN 200 GRAM(S): 10 INJECTION, POWDER, FOR SOLUTION INTRAVENOUS at 10:05

## 2019-08-23 RX ADMIN — GABAPENTIN 800 MILLIGRAM(S): 400 CAPSULE ORAL at 13:57

## 2019-08-23 RX ADMIN — OXYCODONE AND ACETAMINOPHEN 2 TABLET(S): 5; 325 TABLET ORAL at 02:17

## 2019-08-23 RX ADMIN — Medication 1 PATCH: at 22:10

## 2019-08-23 RX ADMIN — NAFCILLIN 200 GRAM(S): 10 INJECTION, POWDER, FOR SOLUTION INTRAVENOUS at 02:16

## 2019-08-23 RX ADMIN — Medication 30 MILLIGRAM(S): at 10:05

## 2019-08-23 RX ADMIN — Medication 30 MILLIGRAM(S): at 16:08

## 2019-08-23 RX ADMIN — NAFCILLIN 200 GRAM(S): 10 INJECTION, POWDER, FOR SOLUTION INTRAVENOUS at 22:08

## 2019-08-23 RX ADMIN — NAFCILLIN 200 GRAM(S): 10 INJECTION, POWDER, FOR SOLUTION INTRAVENOUS at 06:20

## 2019-08-23 RX ADMIN — Medication 30 MILLIGRAM(S): at 15:43

## 2019-08-23 RX ADMIN — NAFCILLIN 200 GRAM(S): 10 INJECTION, POWDER, FOR SOLUTION INTRAVENOUS at 13:55

## 2019-08-23 RX ADMIN — OXYCODONE AND ACETAMINOPHEN 2 TABLET(S): 5; 325 TABLET ORAL at 03:15

## 2019-08-23 RX ADMIN — CHLORHEXIDINE GLUCONATE 1 APPLICATION(S): 213 SOLUTION TOPICAL at 10:04

## 2019-08-23 RX ADMIN — OXYCODONE AND ACETAMINOPHEN 2 TABLET(S): 5; 325 TABLET ORAL at 21:15

## 2019-08-23 RX ADMIN — OXYCODONE AND ACETAMINOPHEN 2 TABLET(S): 5; 325 TABLET ORAL at 08:40

## 2019-08-23 RX ADMIN — GABAPENTIN 800 MILLIGRAM(S): 400 CAPSULE ORAL at 22:08

## 2019-08-23 RX ADMIN — Medication 30 MILLIGRAM(S): at 10:04

## 2019-08-23 RX ADMIN — PANTOPRAZOLE SODIUM 40 MILLIGRAM(S): 20 TABLET, DELAYED RELEASE ORAL at 06:30

## 2019-08-23 RX ADMIN — Medication 30 MILLIGRAM(S): at 04:23

## 2019-08-23 RX ADMIN — GABAPENTIN 800 MILLIGRAM(S): 400 CAPSULE ORAL at 06:30

## 2019-08-23 RX ADMIN — OXYCODONE AND ACETAMINOPHEN 2 TABLET(S): 5; 325 TABLET ORAL at 09:15

## 2019-08-23 RX ADMIN — OXYCODONE AND ACETAMINOPHEN 2 TABLET(S): 5; 325 TABLET ORAL at 20:24

## 2019-08-23 RX ADMIN — Medication 1 TABLET(S): at 12:12

## 2019-08-23 RX ADMIN — Medication 30 MILLIGRAM(S): at 23:18

## 2019-08-23 RX ADMIN — NAFCILLIN 200 GRAM(S): 10 INJECTION, POWDER, FOR SOLUTION INTRAVENOUS at 18:21

## 2019-08-23 RX ADMIN — Medication 20 MILLIGRAM(S): at 18:21

## 2019-08-23 RX ADMIN — Medication 30 MILLIGRAM(S): at 04:38

## 2019-08-23 RX ADMIN — Medication 6 MILLIGRAM(S): at 22:09

## 2019-08-23 NOTE — PROGRESS NOTE ADULT - SUBJECTIVE AND OBJECTIVE BOX
LUZ BUCKLEY  54y  Male      Patient is a 54y old  Male who presents with a chief complaint of back pain and sciatica (23 Aug 2019 15:40)  no sob,no cp,no fever,no cough.Back pain is better    REVIEW OF SYSTEMS:  neg      INTERVAL HPI/OVERNIGHT EVENTS:  T(C): 36.7 (08-23-19 @ 16:52), Max: 37.1 (08-22-19 @ 22:14)  HR: 76 (08-23-19 @ 16:52) (74 - 83)  BP: 135/98 (08-23-19 @ 16:52) (135/90 - 147/102)  RR: 18 (08-23-19 @ 16:52) (18 - 18)  SpO2: 99% (08-23-19 @ 16:52) (99% - 100%)  Wt(kg): --  I&O's Summary    22 Aug 2019 07:01  -  23 Aug 2019 07:00  --------------------------------------------------------  IN: 0 mL / OUT: 500 mL / NET: -500 mL    23 Aug 2019 07:01  -  23 Aug 2019 19:59  --------------------------------------------------------  IN: 0 mL / OUT: 820 mL / NET: -820 mL      T(C): 36.7 (08-23-19 @ 16:52), Max: 37.1 (08-22-19 @ 22:14)  HR: 76 (08-23-19 @ 16:52) (74 - 83)  BP: 135/98 (08-23-19 @ 16:52) (135/90 - 147/102)  RR: 18 (08-23-19 @ 16:52) (18 - 18)  SpO2: 99% (08-23-19 @ 16:52) (99% - 100%)  Wt(kg): --Vital Signs Last 24 Hrs  T(C): 36.7 (23 Aug 2019 16:52), Max: 37.1 (22 Aug 2019 22:14)  T(F): 98 (23 Aug 2019 16:52), Max: 98.8 (22 Aug 2019 22:14)  HR: 76 (23 Aug 2019 16:52) (74 - 83)  BP: 135/98 (23 Aug 2019 16:52) (135/90 - 147/102)  BP(mean): --  RR: 18 (23 Aug 2019 16:52) (18 - 18)  SpO2: 99% (23 Aug 2019 16:52) (99% - 100%)    LABS:                        14.4   7.32  )-----------( 311      ( 22 Aug 2019 06:00 )             45.2     08-22    140  |  103  |  21  ----------------------------<  107<H>  4.9   |  25  |  1.11    Ca    9.2      22 Aug 2019 06:00  Phos  4.3     08-22  Mg     2.3     08-22          CAPILLARY BLOOD GLUCOSE                PAST MEDICAL & SURGICAL HISTORY:  Sciatica  S/P ORIF (open reduction internal fixation) fracture      MEDICATIONS  (STANDING):  chlorhexidine 4% Liquid 1 Application(s) Topical <User Schedule>  enalapril 20 milliGRAM(s) Oral every 12 hours  gabapentin 800 milliGRAM(s) Oral three times a day  ketorolac   Injectable 30 milliGRAM(s) IV Push every 8 hours  lidocaine   Patch 1 Patch Transdermal daily  melatonin 6 milliGRAM(s) Oral at bedtime  multivitamin      multivitamin 1 Tablet(s) Oral daily  nafcillin  IVPB      nafcillin  IVPB 2 Gram(s) IV Intermittent every 4 hours  nicotine -  14 mG/24Hr(s) Patch 1 patch Transdermal daily  nortriptyline 25 milliGRAM(s) Oral at bedtime  pantoprazole    Tablet 40 milliGRAM(s) Oral before breakfast    MEDICATIONS  (PRN):  cloNIDine 0.1 milliGRAM(s) Oral two times a day PRN withdrawl symptoms  hydrOXYzine hydrochloride 50 milliGRAM(s) Oral every 8 hours PRN Anxiety  oxyCODONE    5 mG/acetaminophen 325 mG 2 Tablet(s) Oral every 6 hours PRN Severe Pain (7 - 10)  tiZANidine 4 milliGRAM(s) Oral every 6 hours PRN Muscle Spasm        RADIOLOGY & ADDITIONAL TESTS:    Imaging Personally Reviewed:  [ ] YES  [ ] NO    Consultant(s) Notes Reviewed:  [ x] YES  [ ] NO    PHYSICAL EXAM:  GENERAL: NAD, well-groomed, well-developed  HEAD:  Atraumatic, Normocephalic  EYES: EOMI, PERRLA, conjunctiva and sclera clear  ENMT: No tonsillar erythema, exudates, or enlargement; Moist mucous membranes, Good dentition, No lesions  NECK: Supple, No JVD, Normal thyroid  NERVOUS SYSTEM:  Alert & Oriented X3, Good concentration; Motor Strength 5/5 B/L upper and lower extremities; DTRs 2+ intact and symmetric  CHEST/LUNG: Clear to percussion bilaterally; No rales, rhonchi, wheezing, or rubs  HEART: Regular rate and rhythm; No murmurs, rubs, or gallops  ABDOMEN: Soft, Nontender, Nondistended; Bowel sounds present  EXTREMITIES:  2+ Peripheral Pulses, No clubbing, cyanosis, or edema  LYMPH: No lymphadenopathy noted  SKIN: No rashes or lesions    Care Discussed with Consultants/Other Providers [ ] YES  [ ] NO      Code Status: [] Full Code [] DNR [] DNI [] Goals of Care:   Disposition: [] ICU [] Stroke Unit [] RCU []PCU []Floor [] Discharge Home         JEANINE RodriguezP

## 2019-08-23 NOTE — PROGRESS NOTE ADULT - SUBJECTIVE AND OBJECTIVE BOX
LUZ BUCKLEY:[id number],   54yMale followed for:  No Known Allergies    PAST MEDICAL & SURGICAL HISTORY:  Sciatica  S/P ORIF (open reduction internal fixation) fracture    FAMILY HISTORY:    MEDICATIONS  (STANDING):  chlorhexidine 4% Liquid 1 Application(s) Topical <User Schedule>  enalapril 20 milliGRAM(s) Oral every 12 hours  gabapentin 800 milliGRAM(s) Oral three times a day  ketorolac   Injectable 30 milliGRAM(s) IV Push every 6 hours  lidocaine   Patch 1 Patch Transdermal daily  melatonin 6 milliGRAM(s) Oral at bedtime  multivitamin      multivitamin 1 Tablet(s) Oral daily  nafcillin  IVPB      nafcillin  IVPB 2 Gram(s) IV Intermittent every 4 hours  nortriptyline 25 milliGRAM(s) Oral at bedtime  pantoprazole    Tablet 40 milliGRAM(s) Oral before breakfast    MEDICATIONS  (PRN):  cloNIDine 0.1 milliGRAM(s) Oral two times a day PRN withdrawl symptoms  hydrOXYzine hydrochloride 50 milliGRAM(s) Oral every 8 hours PRN Anxiety  oxyCODONE    5 mG/acetaminophen 325 mG 2 Tablet(s) Oral every 6 hours PRN Severe Pain (7 - 10)  tiZANidine 4 milliGRAM(s) Oral every 6 hours PRN Muscle Spasm      Vital Signs Last 24 Hrs  T(C): 36.3 (23 Aug 2019 05:17), Max: 37.1 (22 Aug 2019 22:14)  T(F): 97.4 (23 Aug 2019 05:17), Max: 98.8 (22 Aug 2019 22:14)  HR: 74 (23 Aug 2019 05:17) (71 - 80)  BP: 135/90 (23 Aug 2019 05:17) (135/90 - 146/96)  BP(mean): --  RR: 18 (23 Aug 2019 05:17) (17 - 18)  SpO2: 100% (23 Aug 2019 05:17) (99% - 100%)  nc/at  s1s2  cta  soft, nt, nd no guarding or rebound  no c/c/e    CBC Full  -  ( 22 Aug 2019 06:00 )  WBC Count : 7.32 K/uL  RBC Count : 4.83 M/uL  Hemoglobin : 14.4 g/dL  Hematocrit : 45.2 %  Platelet Count - Automated : 311 K/uL  Mean Cell Volume : 93.6 fL  Mean Cell Hemoglobin : 29.8 pg  Mean Cell Hemoglobin Concentration : 31.9 %  Auto Neutrophil # : x  Auto Lymphocyte # : x  Auto Monocyte # : x  Auto Eosinophil # : x  Auto Basophil # : x  Auto Neutrophil % : x  Auto Lymphocyte % : x  Auto Monocyte % : x  Auto Eosinophil % : x  Auto Basophil % : x    08-22    140  |  103  |  21  ----------------------------<  107<H>  4.9   |  25  |  1.11    Ca    9.2      22 Aug 2019 06:00  Phos  4.3     08-22  Mg     2.3     08-22

## 2019-08-23 NOTE — PROGRESS NOTE ADULT - SUBJECTIVE AND OBJECTIVE BOX
Follow Up:  MSSA bacteremia    Interval History: pt stable and afebrile, no acute events    ROS:      All other systems negative    Constitutional: no fever, no chills, no weight loss, no night sweats  Eye: no eye pain, no redness, no vision changes  ENT:  no sore throat, no rhinorrhea  Cardiovascular:  no chest pain, no palpitation  Respiratory:  no SOB, no cough  GI:  no abd pain, no vomiting, no diarrhea  urinary: no dysuria, no hematuria, no flank pain  : no penile discharge or bleeding  musculoskeletal:  severe back pain  skin:  no rash  neurology:  no headache, no seizure, no change in mental status      Allergies  No Known Allergies        ANTIMICROBIALS:  nafcillin  IVPB    nafcillin  IVPB 2 every 4 hours      OTHER MEDS:  chlorhexidine 4% Liquid 1 Application(s) Topical <User Schedule>  cloNIDine 0.1 milliGRAM(s) Oral two times a day PRN  enalapril 20 milliGRAM(s) Oral every 12 hours  gabapentin 800 milliGRAM(s) Oral three times a day  hydrOXYzine hydrochloride 50 milliGRAM(s) Oral every 8 hours PRN  ketorolac   Injectable 30 milliGRAM(s) IV Push every 8 hours  lidocaine   Patch 1 Patch Transdermal daily  melatonin 6 milliGRAM(s) Oral at bedtime  multivitamin      multivitamin 1 Tablet(s) Oral daily  nortriptyline 25 milliGRAM(s) Oral at bedtime  oxyCODONE    5 mG/acetaminophen 325 mG 2 Tablet(s) Oral every 6 hours PRN  pantoprazole    Tablet 40 milliGRAM(s) Oral before breakfast  tiZANidine 4 milliGRAM(s) Oral every 6 hours PRN      Vital Signs Last 24 Hrs  T(C): 36.8 (23 Aug 2019 09:52), Max: 37.1 (22 Aug 2019 22:14)  T(F): 98.2 (23 Aug 2019 09:52), Max: 98.8 (22 Aug 2019 22:14)  HR: 83 (23 Aug 2019 09:52) (74 - 83)  BP: 147/102 (23 Aug 2019 09:52) (135/90 - 147/102)  BP(mean): --  RR: 18 (23 Aug 2019 09:52) (18 - 18)  SpO2: 99% (23 Aug 2019 09:52) (99% - 100%)    Physical Exam:  General:    NAD, non toxic  Head: atraumatic, normocephalic  Eyes: normal sclera and conjunctiva  ENT:   no oropharyngeal lesions, no LAD, neck supple  Cardio:    regular S1,S2  Respiratory:   clear b/l, no wheezing  abd:   soft, BS +, not tender, no hepatosplenomegaly  :     no CVAT, no suprapubic tenderness, no romero  Musculoskeletal : RLE scar from ORIF but no tenderness  Skin:    no rash  vascular: no phlebitis, normal pulses  Neurologic:     no focal deficits  psych: normal affect                          14.4   7.32  )-----------( 311      ( 22 Aug 2019 06:00 )             45.2       08-22    140  |  103  |  21  ----------------------------<  107<H>  4.9   |  25  |  1.11    Ca    9.2      22 Aug 2019 06:00  Phos  4.3     08-22  Mg     2.3     08-22            MICROBIOLOGY:  v  BLOOD PERIPHERAL  08-12-19 --  --  --      BLOOD VENOUS  08-10-19 --  --  --      BLOOD  08-08-19 --  --  --      BLOOD  08-07-19 --  --  Staphylococcus aureus  BLOOD CULTURE PCR                RADIOLOGY:  Images below reviewed personally  < from: MR Lumbar Spine w/wo IV Cont (08.19.19 @ 11:13) >  IMPRESSION: Redemonstrated abnormal signal involving the L3 vertebral   body with associated enhancement and edema which has gradually progressed   from the prior exams. There is a small amount of prevertebral soft tissue   swelling/enhancement. Findings favor evolving osteomyelitis.    No evidence of epidural abscess.

## 2019-08-23 NOTE — PROGRESS NOTE ADULT - ASSESSMENT
54 m with IVDA, hepatitis C (not treated), RLE ORIF and hardware, previous bacteremia and lumbar osteo? 2004, presented with worsening back pain, with difficulty ambulating due to pain.  here afebrile normal WBC  blood cx: 2/2 MSSA  hep C viral load: [id number]  spine MRI: enhancing lesion on L3 s/o occult fx or a lesion but CT negative for FX or lesion  hiv negative    MSSA bacteremia and L3 osteo   active heroine use  repeat cx 8/12 negative   TTE negative  repeat MRI 8/19 with progressed enhancement and edema of L3 with perivertebral soft tissue swelling/enhancement, s/o evolving osteo, no epidural abscess      * c/w Nafcillin 2 q 4  * pt can not be discharged with a line in view of active IVDA  * will have continue the course here or another rehab facility  * will do an 8 week course for L3 osteo until 10/7  * f/u with pain management  * will need weekly CBC, CMP, ESR and CRP

## 2019-08-24 LAB
AMPHET UR-MCNC: NEGATIVE — SIGNIFICANT CHANGE UP
BARBITURATES UR SCN-MCNC: POSITIVE — SIGNIFICANT CHANGE UP
BENZODIAZ UR-MCNC: NEGATIVE — SIGNIFICANT CHANGE UP
CANNABINOIDS UR-MCNC: POSITIVE — SIGNIFICANT CHANGE UP
COCAINE METAB.OTHER UR-MCNC: NEGATIVE — SIGNIFICANT CHANGE UP
METHADONE UR-MCNC: NEGATIVE — SIGNIFICANT CHANGE UP
OPIATES UR-MCNC: POSITIVE — SIGNIFICANT CHANGE UP
OXYCODONE UR-MCNC: POSITIVE — HIGH
PCP UR-MCNC: NEGATIVE — SIGNIFICANT CHANGE UP

## 2019-08-24 RX ADMIN — OXYCODONE AND ACETAMINOPHEN 2 TABLET(S): 5; 325 TABLET ORAL at 15:40

## 2019-08-24 RX ADMIN — Medication 30 MILLIGRAM(S): at 13:26

## 2019-08-24 RX ADMIN — Medication 30 MILLIGRAM(S): at 21:12

## 2019-08-24 RX ADMIN — OXYCODONE AND ACETAMINOPHEN 2 TABLET(S): 5; 325 TABLET ORAL at 23:20

## 2019-08-24 RX ADMIN — GABAPENTIN 800 MILLIGRAM(S): 400 CAPSULE ORAL at 21:12

## 2019-08-24 RX ADMIN — Medication 1 PATCH: at 22:00

## 2019-08-24 RX ADMIN — OXYCODONE AND ACETAMINOPHEN 2 TABLET(S): 5; 325 TABLET ORAL at 16:30

## 2019-08-24 RX ADMIN — NAFCILLIN 200 GRAM(S): 10 INJECTION, POWDER, FOR SOLUTION INTRAVENOUS at 18:06

## 2019-08-24 RX ADMIN — Medication 6 MILLIGRAM(S): at 21:12

## 2019-08-24 RX ADMIN — Medication 20 MILLIGRAM(S): at 06:05

## 2019-08-24 RX ADMIN — Medication 20 MILLIGRAM(S): at 18:06

## 2019-08-24 RX ADMIN — TIZANIDINE 4 MILLIGRAM(S): 4 TABLET ORAL at 13:26

## 2019-08-24 RX ADMIN — Medication 30 MILLIGRAM(S): at 06:26

## 2019-08-24 RX ADMIN — CHLORHEXIDINE GLUCONATE 1 APPLICATION(S): 213 SOLUTION TOPICAL at 09:56

## 2019-08-24 RX ADMIN — Medication 1 TABLET(S): at 13:26

## 2019-08-24 RX ADMIN — PANTOPRAZOLE SODIUM 40 MILLIGRAM(S): 20 TABLET, DELAYED RELEASE ORAL at 06:05

## 2019-08-24 RX ADMIN — OXYCODONE AND ACETAMINOPHEN 2 TABLET(S): 5; 325 TABLET ORAL at 08:48

## 2019-08-24 RX ADMIN — GABAPENTIN 800 MILLIGRAM(S): 400 CAPSULE ORAL at 13:27

## 2019-08-24 RX ADMIN — Medication 1 PATCH: at 21:12

## 2019-08-24 RX ADMIN — NAFCILLIN 200 GRAM(S): 10 INJECTION, POWDER, FOR SOLUTION INTRAVENOUS at 06:06

## 2019-08-24 RX ADMIN — Medication 30 MILLIGRAM(S): at 06:11

## 2019-08-24 RX ADMIN — OXYCODONE AND ACETAMINOPHEN 2 TABLET(S): 5; 325 TABLET ORAL at 22:50

## 2019-08-24 RX ADMIN — GABAPENTIN 800 MILLIGRAM(S): 400 CAPSULE ORAL at 06:05

## 2019-08-24 RX ADMIN — OXYCODONE AND ACETAMINOPHEN 2 TABLET(S): 5; 325 TABLET ORAL at 03:20

## 2019-08-24 RX ADMIN — NAFCILLIN 200 GRAM(S): 10 INJECTION, POWDER, FOR SOLUTION INTRAVENOUS at 22:00

## 2019-08-24 RX ADMIN — NAFCILLIN 200 GRAM(S): 10 INJECTION, POWDER, FOR SOLUTION INTRAVENOUS at 09:56

## 2019-08-24 RX ADMIN — NAFCILLIN 200 GRAM(S): 10 INJECTION, POWDER, FOR SOLUTION INTRAVENOUS at 01:51

## 2019-08-24 RX ADMIN — Medication 1 PATCH: at 07:46

## 2019-08-24 RX ADMIN — NAFCILLIN 200 GRAM(S): 10 INJECTION, POWDER, FOR SOLUTION INTRAVENOUS at 14:47

## 2019-08-24 RX ADMIN — OXYCODONE AND ACETAMINOPHEN 2 TABLET(S): 5; 325 TABLET ORAL at 09:37

## 2019-08-24 RX ADMIN — TIZANIDINE 4 MILLIGRAM(S): 4 TABLET ORAL at 20:15

## 2019-08-24 RX ADMIN — OXYCODONE AND ACETAMINOPHEN 2 TABLET(S): 5; 325 TABLET ORAL at 02:26

## 2019-08-24 NOTE — PROGRESS NOTE ADULT - ASSESSMENT
55 yo male active heroin use with chronic back pain, kidney cyst a/w acute on chronic back pain with sciatica with difficulty ambulating due to pain 8/4. patient found to have s. aureus bacteremia on cefazolin iv. nephrology consulted for ARNODLO.     ARNOLDO  peaked 1.79   ARNOLDO likely prerenal on NSAIDs and lisinopril  renal function improved with IVF  off ibuprofen.   renal us done showed left renal cyst  Pt currently on enalapril 20 bid and ketorolac iv for pain  off IVF  renal function slightly elevated but stable today   monitor bmp, urine output     HTN  controlled  monitor bmp and bp    Hep C  f/u GI  outpatient treatment    Proteinuria  mild  p/c ratio <300, Monitor  hep c +

## 2019-08-24 NOTE — PROGRESS NOTE ADULT - SUBJECTIVE AND OBJECTIVE BOX
LUZ BUCKLEY  54y  Male      Patient is a 54y old  Male who presents with a chief complaint of back pain and sciatica (24 Aug 2019 16:14)  Back pain is better,no fever,no sob,no cp    REVIEW OF SYSTEMS:  neg      INTERVAL HPI/OVERNIGHT EVENTS:  T(C): 37.1 (08-24-19 @ 21:25), Max: 37.3 (08-24-19 @ 16:00)  HR: 70 (08-24-19 @ 21:25) (62 - 74)  BP: 142/100 (08-24-19 @ 21:25) (130/83 - 150/108)  RR: 17 (08-24-19 @ 21:25) (17 - 18)  SpO2: 100% (08-24-19 @ 21:25) (99% - 100%)  Wt(kg): --  I&O's Summary    23 Aug 2019 07:01  -  24 Aug 2019 07:00  --------------------------------------------------------  IN: 0 mL / OUT: 820 mL / NET: -820 mL      T(C): 37.1 (08-24-19 @ 21:25), Max: 37.3 (08-24-19 @ 16:00)  HR: 70 (08-24-19 @ 21:25) (62 - 74)  BP: 142/100 (08-24-19 @ 21:25) (130/83 - 150/108)  RR: 17 (08-24-19 @ 21:25) (17 - 18)  SpO2: 100% (08-24-19 @ 21:25) (99% - 100%)  Wt(kg): --Vital Signs Last 24 Hrs  T(C): 37.1 (24 Aug 2019 21:25), Max: 37.3 (24 Aug 2019 16:00)  T(F): 98.8 (24 Aug 2019 21:25), Max: 99.2 (24 Aug 2019 16:00)  HR: 70 (24 Aug 2019 21:25) (62 - 74)  BP: 142/100 (24 Aug 2019 21:25) (130/83 - 150/108)  BP(mean): --  RR: 17 (24 Aug 2019 21:25) (17 - 18)  SpO2: 100% (24 Aug 2019 21:25) (99% - 100%)    LABS:              CAPILLARY BLOOD GLUCOSE                PAST MEDICAL & SURGICAL HISTORY:  Sciatica  S/P ORIF (open reduction internal fixation) fracture      MEDICATIONS  (STANDING):  chlorhexidine 4% Liquid 1 Application(s) Topical <User Schedule>  enalapril 20 milliGRAM(s) Oral every 12 hours  gabapentin 800 milliGRAM(s) Oral three times a day  ketorolac   Injectable 30 milliGRAM(s) IV Push every 8 hours  lidocaine   Patch 1 Patch Transdermal daily  melatonin 6 milliGRAM(s) Oral at bedtime  multivitamin      multivitamin 1 Tablet(s) Oral daily  nafcillin  IVPB      nafcillin  IVPB 2 Gram(s) IV Intermittent every 4 hours  nicotine -  14 mG/24Hr(s) Patch 1 patch Transdermal daily  nortriptyline 25 milliGRAM(s) Oral at bedtime  pantoprazole    Tablet 40 milliGRAM(s) Oral before breakfast    MEDICATIONS  (PRN):  cloNIDine 0.1 milliGRAM(s) Oral two times a day PRN withdrawl symptoms  hydrOXYzine hydrochloride 50 milliGRAM(s) Oral every 8 hours PRN Anxiety  oxyCODONE    5 mG/acetaminophen 325 mG 2 Tablet(s) Oral every 6 hours PRN Severe Pain (7 - 10)  tiZANidine 4 milliGRAM(s) Oral every 6 hours PRN Muscle Spasm        RADIOLOGY & ADDITIONAL TESTS:    Imaging Personally Reviewed:  [ ] YES  [ ] NO    Consultant(s) Notes Reviewed:  [ ] YES  [ ] NO    PHYSICAL EXAM:  GENERAL: NAD, well-groomed, well-developed  HEAD:  Atraumatic, Normocephalic  EYES: EOMI, PERRLA, conjunctiva and sclera clear  ENMT: No tonsillar erythema, exudates, or enlargement; Moist mucous membranes, Good dentition, No lesions  NECK: Supple, No JVD, Normal thyroid  NERVOUS SYSTEM:  Alert & Oriented X3, Good concentration; Motor Strength 5/5 B/L upper and lower extremities; DTRs 2+ intact and symmetric  CHEST/LUNG: Clear to percussion bilaterally; No rales, rhonchi, wheezing, or rubs  HEART: Regular rate and rhythm; No murmurs, rubs, or gallops  ABDOMEN: Soft, Nontender, Nondistended; Bowel sounds present  EXTREMITIES:  2+ Peripheral Pulses, No clubbing, cyanosis, or edema  LYMPH: No lymphadenopathy noted  SKIN: No rashes or lesions    Care Discussed with Consultants/Other Providers [ ] YES  [ ] NO      Code Status: [] Full Code [] DNR [] DNI [] Goals of Care:   Disposition: [] ICU [] Stroke Unit [] RCU []PCU []Floor [] Discharge Home         CECY [email or web address]

## 2019-08-24 NOTE — PROGRESS NOTE ADULT - SUBJECTIVE AND OBJECTIVE BOX
[facility] [place] [facility]   MD JEYSON GALAN D.O, PA    TELEPHONE NUMBERS:  OFFICE: [phone]  DR. FORREST CELL: [phone]  MARIUSZ MUÑOZ CELL: [phone]  DR. OCHOA CELL:  [phone]    RENAL FOLLOW UP NOTE  --------------------------------------------------------------------------------  HPI: Pt seen and examined at bedside. Pt has no complaints   Denies SOB, chest pain     PAST HISTORY  --------------------------------------------------------------------------------  No significant changes to PMH, PSH, FHx, SHx, unless otherwise noted    ALLERGIES & MEDICATIONS  --------------------------------------------------------------------------------  Allergies    No Known Allergies    Intolerances      Standing Inpatient Medications  chlorhexidine 4% Liquid 1 Application(s) Topical <User Schedule>  enalapril 20 milliGRAM(s) Oral every 12 hours  gabapentin 800 milliGRAM(s) Oral three times a day  ketorolac   Injectable 30 milliGRAM(s) IV Push every 8 hours  lidocaine   Patch 1 Patch Transdermal daily  melatonin 6 milliGRAM(s) Oral at bedtime  multivitamin      multivitamin 1 Tablet(s) Oral daily  nafcillin  IVPB      nafcillin  IVPB 2 Gram(s) IV Intermittent every 4 hours  nicotine -  14 mG/24Hr(s) Patch 1 patch Transdermal daily  nortriptyline 25 milliGRAM(s) Oral at bedtime  pantoprazole    Tablet 40 milliGRAM(s) Oral before breakfast    PRN Inpatient Medications  cloNIDine 0.1 milliGRAM(s) Oral two times a day PRN  hydrOXYzine hydrochloride 50 milliGRAM(s) Oral every 8 hours PRN  oxyCODONE    5 mG/acetaminophen 325 mG 2 Tablet(s) Oral every 6 hours PRN  tiZANidine 4 milliGRAM(s) Oral every 6 hours PRN      REVIEW OF SYSTEMS  --------------------------------------------------------------------------------  General: no fever  CVS: no chest pain  RESP: no sob, no cough  ABD: no abdominal pain  : no dysuria,  MSK: no edema     VITALS/PHYSICAL EXAM  --------------------------------------------------------------------------------  T(C): 37.3 (08-24-19 @ 16:00), Max: 37.3 (08-24-19 @ 16:00)  HR: 65 (08-24-19 @ 16:00) (64 - 76)  BP: 131/95 (08-24-19 @ 16:00) (130/83 - 150/108)  RR: 18 (08-24-19 @ 16:00) (18 - 18)  SpO2: 100% (08-24-19 @ 16:00) (99% - 100%)  Wt(kg): --    08-23-19 @ 07:01  -  08-24-19 @ 07:00  --------------------------------------------------------  IN: 0 mL / OUT: 820 mL / NET: -820 mL      Physical Exam:  	Gen: NAD  	HEENT: MMM  	Pulm: CTA B/L  	CV: S1S2  	Abd: Soft, +BS  	Ext: No LE edema B/L                      Neuro: Awake   	Skin: Warm and Dry       LABS/STUDIES  --------------------------------------------------------------------------------      Creatinine Trend:  SCr 1.11 [08-22 @ 06:00]  SCr 1.04 [08-21 @ 06:25]  SCr 1.02 [08-20 @ 06:00]  SCr 1.05 [08-18 @ 06:15]  SCr 0.91 [08-17 @ 05:10]    Urinalysis - [08-12-19 @ 21:20]      Color YELLOW / Appearance CLEAR / SG 1.033 / pH 5.5      Gluc NEGATIVE / Ketone NEGATIVE  / Bili NEGATIVE / Urobili NORMAL       Blood NEGATIVE / Protein 30 / Leuk Est NEGATIVE / Nitrite NEGATIVE      RBC 0-2 / WBC 3-5 / Hyaline NEGATIVE / Gran  / Sq Epi OCC / Non Sq Epi  / Bacteria NEGATIVE        HCV 9.89, Reactive      [08-05-19 @ 05:25]  HIV Nonreactive The HIV Ag/Ab Combo test performed screens for HIV-1 p24  antigen, antibodies to HIV-1 (group M and group O), and  antibodies to HIV-2. All specimens repeatedly reactive  will reflex to an HIV 1/2 antibody confirmation and  differentiation test. This assay detects p24 antigen which  may be present prior to the development of HIV antibodies,  therefore a reactive result with a negative HIV 1/2 AB  Confirmation should be followed up with HIV-1 RNA, HIV-2  RNA and repeat testing in 4-8 weeks. A nonreactive result  does not preclude previous exposure to or infection with  HIV-1 or HIV-2. [facility] prohibits disclosure of this  result to any unauthorized party.      [08-10-19 @ 06:00]

## 2019-08-25 RX ORDER — KETOROLAC TROMETHAMINE 30 MG/ML
30 SYRINGE (ML) INJECTION ONCE
Refills: 0 | Status: DISCONTINUED | OUTPATIENT
Start: 2019-08-25 | End: 2019-08-25

## 2019-08-25 RX ORDER — TRAMADOL HYDROCHLORIDE 50 MG/1
25 TABLET ORAL EVERY 8 HOURS
Refills: 0 | Status: DISCONTINUED | OUTPATIENT
Start: 2019-08-25 | End: 2019-08-26

## 2019-08-25 RX ORDER — KETOROLAC TROMETHAMINE 30 MG/ML
15 SYRINGE (ML) INJECTION ONCE
Refills: 0 | Status: DISCONTINUED | OUTPATIENT
Start: 2019-08-25 | End: 2019-08-26

## 2019-08-25 RX ADMIN — TIZANIDINE 4 MILLIGRAM(S): 4 TABLET ORAL at 16:39

## 2019-08-25 RX ADMIN — NAFCILLIN 200 GRAM(S): 10 INJECTION, POWDER, FOR SOLUTION INTRAVENOUS at 05:52

## 2019-08-25 RX ADMIN — Medication 1 PATCH: at 08:15

## 2019-08-25 RX ADMIN — NAFCILLIN 200 GRAM(S): 10 INJECTION, POWDER, FOR SOLUTION INTRAVENOUS at 11:07

## 2019-08-25 RX ADMIN — Medication 20 MILLIGRAM(S): at 17:55

## 2019-08-25 RX ADMIN — TRAMADOL HYDROCHLORIDE 25 MILLIGRAM(S): 50 TABLET ORAL at 14:16

## 2019-08-25 RX ADMIN — OXYCODONE AND ACETAMINOPHEN 2 TABLET(S): 5; 325 TABLET ORAL at 16:44

## 2019-08-25 RX ADMIN — TRAMADOL HYDROCHLORIDE 25 MILLIGRAM(S): 50 TABLET ORAL at 14:41

## 2019-08-25 RX ADMIN — OXYCODONE AND ACETAMINOPHEN 2 TABLET(S): 5; 325 TABLET ORAL at 17:37

## 2019-08-25 RX ADMIN — Medication 30 MILLIGRAM(S): at 05:52

## 2019-08-25 RX ADMIN — GABAPENTIN 800 MILLIGRAM(S): 400 CAPSULE ORAL at 05:52

## 2019-08-25 RX ADMIN — OXYCODONE AND ACETAMINOPHEN 2 TABLET(S): 5; 325 TABLET ORAL at 12:04

## 2019-08-25 RX ADMIN — Medication 1 PATCH: at 21:15

## 2019-08-25 RX ADMIN — NAFCILLIN 200 GRAM(S): 10 INJECTION, POWDER, FOR SOLUTION INTRAVENOUS at 17:55

## 2019-08-25 RX ADMIN — Medication 30 MILLIGRAM(S): at 19:42

## 2019-08-25 RX ADMIN — OXYCODONE AND ACETAMINOPHEN 2 TABLET(S): 5; 325 TABLET ORAL at 11:07

## 2019-08-25 RX ADMIN — TIZANIDINE 4 MILLIGRAM(S): 4 TABLET ORAL at 05:52

## 2019-08-25 RX ADMIN — NAFCILLIN 200 GRAM(S): 10 INJECTION, POWDER, FOR SOLUTION INTRAVENOUS at 01:55

## 2019-08-25 RX ADMIN — OXYCODONE AND ACETAMINOPHEN 2 TABLET(S): 5; 325 TABLET ORAL at 04:30

## 2019-08-25 RX ADMIN — OXYCODONE AND ACETAMINOPHEN 2 TABLET(S): 5; 325 TABLET ORAL at 05:08

## 2019-08-25 RX ADMIN — Medication 1 PATCH: at 19:43

## 2019-08-25 RX ADMIN — OXYCODONE AND ACETAMINOPHEN 2 TABLET(S): 5; 325 TABLET ORAL at 22:44

## 2019-08-25 RX ADMIN — Medication 1 TABLET(S): at 11:09

## 2019-08-25 RX ADMIN — TIZANIDINE 4 MILLIGRAM(S): 4 TABLET ORAL at 22:44

## 2019-08-25 RX ADMIN — Medication 20 MILLIGRAM(S): at 05:51

## 2019-08-25 RX ADMIN — GABAPENTIN 800 MILLIGRAM(S): 400 CAPSULE ORAL at 14:16

## 2019-08-25 RX ADMIN — Medication 30 MILLIGRAM(S): at 17:55

## 2019-08-25 RX ADMIN — NAFCILLIN 200 GRAM(S): 10 INJECTION, POWDER, FOR SOLUTION INTRAVENOUS at 21:17

## 2019-08-25 RX ADMIN — Medication 6 MILLIGRAM(S): at 21:16

## 2019-08-25 RX ADMIN — OXYCODONE AND ACETAMINOPHEN 2 TABLET(S): 5; 325 TABLET ORAL at 23:14

## 2019-08-25 RX ADMIN — NAFCILLIN 200 GRAM(S): 10 INJECTION, POWDER, FOR SOLUTION INTRAVENOUS at 15:25

## 2019-08-25 RX ADMIN — GABAPENTIN 800 MILLIGRAM(S): 400 CAPSULE ORAL at 21:16

## 2019-08-25 NOTE — PROGRESS NOTE ADULT - SUBJECTIVE AND OBJECTIVE BOX
LUZ BUCKLEY:[id number],   54yMale followed for:  No Known Allergies    PAST MEDICAL & SURGICAL HISTORY:  Sciatica  S/P ORIF (open reduction internal fixation) fracture    FAMILY HISTORY:    MEDICATIONS  (STANDING):  chlorhexidine 4% Liquid 1 Application(s) Topical <User Schedule>  enalapril 20 milliGRAM(s) Oral every 12 hours  gabapentin 800 milliGRAM(s) Oral three times a day  lidocaine   Patch 1 Patch Transdermal daily  melatonin 6 milliGRAM(s) Oral at bedtime  multivitamin      multivitamin 1 Tablet(s) Oral daily  nafcillin  IVPB      nafcillin  IVPB 2 Gram(s) IV Intermittent every 4 hours  nicotine -  14 mG/24Hr(s) Patch 1 patch Transdermal daily  nortriptyline 25 milliGRAM(s) Oral at bedtime  pantoprazole    Tablet 40 milliGRAM(s) Oral before breakfast    MEDICATIONS  (PRN):  cloNIDine 0.1 milliGRAM(s) Oral two times a day PRN withdrawl symptoms  hydrOXYzine hydrochloride 50 milliGRAM(s) Oral every 8 hours PRN Anxiety  oxyCODONE    5 mG/acetaminophen 325 mG 2 Tablet(s) Oral every 6 hours PRN Severe Pain (7 - 10)  tiZANidine 4 milliGRAM(s) Oral every 6 hours PRN Muscle Spasm  traMADol 25 milliGRAM(s) Oral every 8 hours PRN Severe Pain (7 - 10)      Vital Signs Last 24 Hrs  T(C): 37 (25 Aug 2019 14:12), Max: 37.1 (24 Aug 2019 21:25)  T(F): 98.6 (25 Aug 2019 14:12), Max: 98.8 (24 Aug 2019 21:25)  HR: 66 (25 Aug 2019 14:12) (66 - 72)  BP: 147/103 (25 Aug 2019 14:12) (135/89 - 147/103)  BP(mean): --  RR: 18 (25 Aug 2019 14:12) (17 - 18)  SpO2: 100% (25 Aug 2019 14:12) (100% - 100%)  nc/at  s1s2  cta  soft, nt, nd no guarding or rebound  no c/c/e

## 2019-08-25 NOTE — PROGRESS NOTE ADULT - ASSESSMENT
53 yo male active heroin use with chronic back pain, kidney cyst a/w acute on chronic back pain with sciatica with difficulty ambulating due to pain 8/4. patient found to have s. aureus bacteremia on cefazolin iv. nephrology consulted for ARNOLDO.     ARNOLDO  peaked 1.79   ARNOLDO likely prerenal on NSAIDs and lisinopril  renal function improved with IVF  off ibuprofen.   renal us done showed left renal cyst  Pt currently on enalapril 20 bid  now off ketorolac iv for pain today  off IVF  renal function slightly elevated but stable   monitor bmp, urine output     HTN  controlled  monitor bmp and bp    Hep C  f/u GI  outpatient treatment    Proteinuria  mild  p/c ratio <300, Monitor  hep c +

## 2019-08-25 NOTE — PROGRESS NOTE ADULT - SUBJECTIVE AND OBJECTIVE BOX
MARCIO LUZ  54y  Male      Patient is a 54y old  Male who presents with a chief complaint of back pain and sciatica (25 Aug 2019 15:28)  -still c/o back pain,requesting tramadol.no sob,no cp,no fever.BP is still running high    REVIEW OF SYSTEMS:  as above  INTERVAL HPI/OVERNIGHT EVENTS:  T(C): 37 (08-25-19 @ 14:12), Max: 37.1 (08-24-19 @ 21:25)  HR: 66 (08-25-19 @ 14:12) (62 - 72)  BP: 147/103 (08-25-19 @ 14:12) (131/91 - 147/103)  RR: 18 (08-25-19 @ 14:12) (17 - 18)  SpO2: 100% (08-25-19 @ 14:12) (100% - 100%)  Wt(kg): --  I&O's Summary    25 Aug 2019 07:01  -  25 Aug 2019 16:57  --------------------------------------------------------  IN: 400 mL / OUT: 0 mL / NET: 400 mL      T(C): 37 (08-25-19 @ 14:12), Max: 37.1 (08-24-19 @ 21:25)  HR: 66 (08-25-19 @ 14:12) (62 - 72)  BP: 147/103 (08-25-19 @ 14:12) (131/91 - 147/103)  RR: 18 (08-25-19 @ 14:12) (17 - 18)  SpO2: 100% (08-25-19 @ 14:12) (100% - 100%)  Wt(kg): --Vital Signs Last 24 Hrs  T(C): 37 (25 Aug 2019 14:12), Max: 37.1 (24 Aug 2019 21:25)  T(F): 98.6 (25 Aug 2019 14:12), Max: 98.8 (24 Aug 2019 21:25)  HR: 66 (25 Aug 2019 14:12) (62 - 72)  BP: 147/103 (25 Aug 2019 14:12) (131/91 - 147/103)  BP(mean): --  RR: 18 (25 Aug 2019 14:12) (17 - 18)  SpO2: 100% (25 Aug 2019 14:12) (100% - 100%)    LABS:              CAPILLARY BLOOD GLUCOSE                PAST MEDICAL & SURGICAL HISTORY:  Sciatica  S/P ORIF (open reduction internal fixation) fracture      MEDICATIONS  (STANDING):  chlorhexidine 4% Liquid 1 Application(s) Topical <User Schedule>  enalapril 20 milliGRAM(s) Oral every 12 hours  gabapentin 800 milliGRAM(s) Oral three times a day  lidocaine   Patch 1 Patch Transdermal daily  melatonin 6 milliGRAM(s) Oral at bedtime  multivitamin      multivitamin 1 Tablet(s) Oral daily  nafcillin  IVPB      nafcillin  IVPB 2 Gram(s) IV Intermittent every 4 hours  nicotine -  14 mG/24Hr(s) Patch 1 patch Transdermal daily  nortriptyline 25 milliGRAM(s) Oral at bedtime  pantoprazole    Tablet 40 milliGRAM(s) Oral before breakfast    MEDICATIONS  (PRN):  cloNIDine 0.1 milliGRAM(s) Oral two times a day PRN withdrawl symptoms  hydrOXYzine hydrochloride 50 milliGRAM(s) Oral every 8 hours PRN Anxiety  oxyCODONE    5 mG/acetaminophen 325 mG 2 Tablet(s) Oral every 6 hours PRN Severe Pain (7 - 10)  tiZANidine 4 milliGRAM(s) Oral every 6 hours PRN Muscle Spasm  traMADol 25 milliGRAM(s) Oral every 8 hours PRN Severe Pain (7 - 10)        RADIOLOGY & ADDITIONAL TESTS:    Imaging Personally Reviewed:  [ ] YES  [ ] NO    Consultant(s) Notes Reviewed:  [x ] YES  [ ] NO    PHYSICAL EXAM:  GENERAL: NAD, well-groomed, well-developed  HEAD:  Atraumatic, Normocephalic  EYES: EOMI, PERRLA, conjunctiva and sclera clear  ENMT: No tonsillar erythema, exudates, or enlargement; Moist mucous membranes, Good dentition, No lesions  NECK: Supple, No JVD, Normal thyroid  NERVOUS SYSTEM:  Alert & Oriented X3, nonfocal  CHEST/LUNG: Clear to percussion bilaterally; No rales, rhonchi, wheezing, or rubs  HEART: Regular rate and rhythm; No murmurs, rubs, or gallops  ABDOMEN: Soft, Nontender, Nondistended; Bowel sounds present  EXTREMITIES:  2+ Peripheral Pulses, No clubbing, cyanosis, or edema  LYMPH: No lymphadenopathy noted  SKIN: No rashes or lesions    Care Discussed with Consultants/Other Providers [ ] YES  [ ] NO      Code Status: [] Full Code [] DNR [] DNI [] Goals of Care:   Disposition: [] ICU [] Stroke Unit [] RCU []PCU []Floor [] Discharge Home         CECY [email or web address].FACP

## 2019-08-25 NOTE — PROVIDER CONTACT NOTE (OTHER) - ACTION/TREATMENT ORDERED:
[facility] [employer] contacted. OK to give pain meds 20 mins early and one time IVP Toradol 30mg ordered and administered.

## 2019-08-25 NOTE — PROGRESS NOTE ADULT - SUBJECTIVE AND OBJECTIVE BOX
[facility]   MD JEYSON GALAN D.O, PA    TELEPHONE NUMBERS:  OFFICE: [phone]  DR. FORREST CELL: [phone]  MARIUSZ MUÑOZ CELL: [phone]  DR. OCHOA CELL:  [phone]    RENAL FOLLOW UP NOTE  --------------------------------------------------------------------------------  HPI:Pt seen and examined at bedside.   Cj SOB, chest pain     PAST HISTORY  --------------------------------------------------------------------------------  No significant changes to PMH, PSH, FHx, SHx, unless otherwise noted    ALLERGIES & MEDICATIONS  --------------------------------------------------------------------------------  Allergies    No Known Allergies    Intolerances      Standing Inpatient Medications  chlorhexidine 4% Liquid 1 Application(s) Topical <User Schedule>  enalapril 20 milliGRAM(s) Oral every 12 hours  gabapentin 800 milliGRAM(s) Oral three times a day  lidocaine   Patch 1 Patch Transdermal daily  melatonin 6 milliGRAM(s) Oral at bedtime  multivitamin      multivitamin 1 Tablet(s) Oral daily  nafcillin  IVPB      nafcillin  IVPB 2 Gram(s) IV Intermittent every 4 hours  nicotine -  14 mG/24Hr(s) Patch 1 patch Transdermal daily  nortriptyline 25 milliGRAM(s) Oral at bedtime  pantoprazole    Tablet 40 milliGRAM(s) Oral before breakfast    PRN Inpatient Medications  cloNIDine 0.1 milliGRAM(s) Oral two times a day PRN  hydrOXYzine hydrochloride 50 milliGRAM(s) Oral every 8 hours PRN  oxyCODONE    5 mG/acetaminophen 325 mG 2 Tablet(s) Oral every 6 hours PRN  tiZANidine 4 milliGRAM(s) Oral every 6 hours PRN  traMADol 25 milliGRAM(s) Oral every 8 hours PRN      REVIEW OF SYSTEMS  --------------------------------------------------------------------------------  General: no fever  CVS: no chest pain  RESP: no sob, no cough  ABD: no abdominal pain  : no dysuria,  MSK: no edema     VITALS/PHYSICAL EXAM  --------------------------------------------------------------------------------  T(C): 37 (08-25-19 @ 14:12), Max: 37.3 (08-24-19 @ 16:00)  HR: 66 (08-25-19 @ 14:12) (62 - 72)  BP: 147/103 (08-25-19 @ 14:12) (131/91 - 147/103)  RR: 18 (08-25-19 @ 14:12) (17 - 18)  SpO2: 100% (08-25-19 @ 14:12) (100% - 100%)  Wt(kg): --        08-25-19 @ 07:01  -  08-25-19 @ 15:28  --------------------------------------------------------  IN: 400 mL / OUT: 0 mL / NET: 400 mL      Physical Exam:  	Gen: NAD  	HEENT: MMM  	Pulm: CTA B/L  	CV: S1S2  	Abd: Soft, +BS  	Ext: No LE edema B/L                      Neuro: Awake   	Skin: Warm and Dry     LABS/STUDIES  --------------------------------------------------------------------------------      Creatinine Trend:  SCr 1.11 [08-22 @ 06:00]  SCr 1.04 [08-21 @ 06:25]  SCr 1.02 [08-20 @ 06:00]  SCr 1.05 [08-18 @ 06:15]  SCr 0.91 [08-17 @ 05:10]    Urinalysis - [08-12-19 @ 21:20]      Color YELLOW / Appearance CLEAR / SG 1.033 / pH 5.5      Gluc NEGATIVE / Ketone NEGATIVE  / Bili NEGATIVE / Urobili NORMAL       Blood NEGATIVE / Protein 30 / Leuk Est NEGATIVE / Nitrite NEGATIVE      RBC 0-2 / WBC 3-5 / Hyaline NEGATIVE / Gran  / Sq Epi OCC / Non Sq Epi  / Bacteria NEGATIVE        HCV 9.89, Reactive      [08-05-19 @ 05:25]  HIV Nonreactive The HIV Ag/Ab Combo test performed screens for HIV-1 p24  antigen, antibodies to HIV-1 (group M and group O), and  antibodies to HIV-2. All specimens repeatedly reactive  will reflex to an HIV 1/2 antibody confirmation and  differentiation test. This assay detects p24 antigen which  may be present prior to the development of HIV antibodies,  therefore a reactive result with a negative HIV 1/2 AB  Confirmation should be followed up with HIV-1 RNA, HIV-2  RNA and repeat testing in 4-8 weeks. A nonreactive result  does not preclude previous exposure to or infection with  HIV-1 or HIV-2. [facility] prohibits disclosure of this  result to any unauthorized party.      [08-10-19 @ 06:00]

## 2019-08-26 PROCEDURE — 99232 SBSQ HOSP IP/OBS MODERATE 35: CPT

## 2019-08-26 PROCEDURE — 93010 ELECTROCARDIOGRAM REPORT: CPT

## 2019-08-26 RX ORDER — OXYCODONE HYDROCHLORIDE 5 MG/1
20 TABLET ORAL EVERY 12 HOURS
Refills: 0 | Status: DISCONTINUED | OUTPATIENT
Start: 2019-08-26 | End: 2019-08-29

## 2019-08-26 RX ORDER — HYDRALAZINE HCL 50 MG
25 TABLET ORAL EVERY 12 HOURS
Refills: 0 | Status: DISCONTINUED | OUTPATIENT
Start: 2019-08-26 | End: 2019-08-31

## 2019-08-26 RX ORDER — KETOROLAC TROMETHAMINE 30 MG/ML
15 SYRINGE (ML) INJECTION ONCE
Refills: 0 | Status: DISCONTINUED | OUTPATIENT
Start: 2019-08-26 | End: 2019-08-26

## 2019-08-26 RX ORDER — KETOROLAC TROMETHAMINE 30 MG/ML
30 SYRINGE (ML) INJECTION ONCE
Refills: 0 | Status: DISCONTINUED | OUTPATIENT
Start: 2019-08-26 | End: 2019-08-26

## 2019-08-26 RX ORDER — OXYCODONE AND ACETAMINOPHEN 5; 325 MG/1; MG/1
2 TABLET ORAL EVERY 6 HOURS
Refills: 0 | Status: DISCONTINUED | OUTPATIENT
Start: 2019-08-26 | End: 2019-08-29

## 2019-08-26 RX ADMIN — Medication 50 MILLIGRAM(S): at 00:22

## 2019-08-26 RX ADMIN — OXYCODONE AND ACETAMINOPHEN 2 TABLET(S): 5; 325 TABLET ORAL at 05:47

## 2019-08-26 RX ADMIN — OXYCODONE AND ACETAMINOPHEN 2 TABLET(S): 5; 325 TABLET ORAL at 17:52

## 2019-08-26 RX ADMIN — OXYCODONE AND ACETAMINOPHEN 2 TABLET(S): 5; 325 TABLET ORAL at 18:39

## 2019-08-26 RX ADMIN — Medication 15 MILLIGRAM(S): at 00:22

## 2019-08-26 RX ADMIN — NAFCILLIN 200 GRAM(S): 10 INJECTION, POWDER, FOR SOLUTION INTRAVENOUS at 02:43

## 2019-08-26 RX ADMIN — NAFCILLIN 200 GRAM(S): 10 INJECTION, POWDER, FOR SOLUTION INTRAVENOUS at 10:01

## 2019-08-26 RX ADMIN — Medication 1 TABLET(S): at 11:44

## 2019-08-26 RX ADMIN — OXYCODONE AND ACETAMINOPHEN 2 TABLET(S): 5; 325 TABLET ORAL at 12:29

## 2019-08-26 RX ADMIN — Medication 30 MILLIGRAM(S): at 06:43

## 2019-08-26 RX ADMIN — Medication 1 PATCH: at 17:58

## 2019-08-26 RX ADMIN — NAFCILLIN 200 GRAM(S): 10 INJECTION, POWDER, FOR SOLUTION INTRAVENOUS at 17:51

## 2019-08-26 RX ADMIN — NAFCILLIN 200 GRAM(S): 10 INJECTION, POWDER, FOR SOLUTION INTRAVENOUS at 14:08

## 2019-08-26 RX ADMIN — Medication 15 MILLIGRAM(S): at 00:52

## 2019-08-26 RX ADMIN — GABAPENTIN 800 MILLIGRAM(S): 400 CAPSULE ORAL at 13:12

## 2019-08-26 RX ADMIN — OXYCODONE AND ACETAMINOPHEN 2 TABLET(S): 5; 325 TABLET ORAL at 11:42

## 2019-08-26 RX ADMIN — Medication 1 PATCH: at 06:42

## 2019-08-26 RX ADMIN — NAFCILLIN 200 GRAM(S): 10 INJECTION, POWDER, FOR SOLUTION INTRAVENOUS at 21:46

## 2019-08-26 RX ADMIN — GABAPENTIN 800 MILLIGRAM(S): 400 CAPSULE ORAL at 21:46

## 2019-08-26 RX ADMIN — GABAPENTIN 800 MILLIGRAM(S): 400 CAPSULE ORAL at 05:17

## 2019-08-26 RX ADMIN — Medication 25 MILLIGRAM(S): at 17:56

## 2019-08-26 RX ADMIN — TIZANIDINE 4 MILLIGRAM(S): 4 TABLET ORAL at 05:17

## 2019-08-26 RX ADMIN — OXYCODONE AND ACETAMINOPHEN 2 TABLET(S): 5; 325 TABLET ORAL at 05:17

## 2019-08-26 RX ADMIN — CHLORHEXIDINE GLUCONATE 1 APPLICATION(S): 213 SOLUTION TOPICAL at 09:59

## 2019-08-26 RX ADMIN — Medication 30 MILLIGRAM(S): at 06:13

## 2019-08-26 RX ADMIN — OXYCODONE HYDROCHLORIDE 20 MILLIGRAM(S): 5 TABLET ORAL at 14:06

## 2019-08-26 RX ADMIN — Medication 20 MILLIGRAM(S): at 05:17

## 2019-08-26 RX ADMIN — Medication 20 MILLIGRAM(S): at 17:13

## 2019-08-26 RX ADMIN — TIZANIDINE 4 MILLIGRAM(S): 4 TABLET ORAL at 17:54

## 2019-08-26 RX ADMIN — Medication 1 PATCH: at 21:30

## 2019-08-26 RX ADMIN — OXYCODONE HYDROCHLORIDE 20 MILLIGRAM(S): 5 TABLET ORAL at 14:40

## 2019-08-26 RX ADMIN — Medication 1 PATCH: at 21:46

## 2019-08-26 NOTE — PROGRESS NOTE ADULT - SUBJECTIVE AND OBJECTIVE BOX
Follow Up:  MSSA bacteremia    Interval History: pt stable and afebrile, no acute events    ROS:      All other systems negative    Constitutional: no fever, no chills, no weight loss, no night sweats  Eye: no eye pain, no redness, no vision changes  ENT:  no sore throat, no rhinorrhea  Cardiovascular:  no chest pain, no palpitation  Respiratory:  no SOB, no cough  GI:  no abd pain, no vomiting, no diarrhea  urinary: no dysuria, no hematuria, no flank pain  : no penile discharge or bleeding  musculoskeletal:  severe back pain  skin:  no rash  neurology:  no headache, no seizure, no change in mental status      Allergies  No Known Allergies        ANTIMICROBIALS:  nafcillin  IVPB    nafcillin  IVPB 2 every 4 hours      OTHER MEDS:  chlorhexidine 4% Liquid 1 Application(s) Topical <User Schedule>  cloNIDine 0.1 milliGRAM(s) Oral two times a day PRN  enalapril 20 milliGRAM(s) Oral every 12 hours  gabapentin 800 milliGRAM(s) Oral three times a day  hydrALAZINE 25 milliGRAM(s) Oral every 12 hours  hydrOXYzine hydrochloride 50 milliGRAM(s) Oral every 8 hours PRN  lidocaine   Patch 1 Patch Transdermal daily  melatonin 6 milliGRAM(s) Oral at bedtime  multivitamin      multivitamin 1 Tablet(s) Oral daily  nicotine -  14 mG/24Hr(s) Patch 1 patch Transdermal daily  nortriptyline 25 milliGRAM(s) Oral at bedtime  oxyCODONE    5 mG/acetaminophen 325 mG 2 Tablet(s) Oral every 6 hours PRN  oxyCODONE  ER Tablet 20 milliGRAM(s) Oral every 12 hours  pantoprazole    Tablet 40 milliGRAM(s) Oral before breakfast  tiZANidine 4 milliGRAM(s) Oral every 6 hours PRN      Vital Signs Last 24 Hrs  T(C): 36.9 (26 Aug 2019 13:41), Max: 36.9 (26 Aug 2019 13:41)  T(F): 98.5 (26 Aug 2019 13:41), Max: 98.5 (26 Aug 2019 13:41)  HR: 76 (26 Aug 2019 13:41) (65 - 76)  BP: 153/99 (26 Aug 2019 13:41) (141/93 - 160/108)  BP(mean): --  RR: 18 (26 Aug 2019 13:41) (16 - 18)  SpO2: 100% (26 Aug 2019 13:41) (100% - 100%)    Physical Exam:  General:    NAD, non toxic  Head: atraumatic, normocephalic  Eyes: normal sclera and conjunctiva  ENT:   no oropharyngeal lesions, no LAD, neck supple  Cardio:    regular S1,S2  Respiratory:   clear b/l, no wheezing  abd:   soft, BS +, not tender, no hepatosplenomegaly  :     no CVAT, no suprapubic tenderness, no romero  Musculoskeletal : RLE scar from ORIF but no tenderness  Skin:    no rash  vascular: no phlebitis, normal pulses  Neurologic:     no focal deficits  psych: normal affect                    MICROBIOLOGY:  v  BLOOD PERIPHERAL  08-12-19 --  --  --      BLOOD VENOUS  08-10-19 --  --  --      BLOOD  08-08-19 --  --  --      BLOOD  08-07-19 --  --  Staphylococcus aureus  BLOOD CULTURE PCR                RADIOLOGY:  Images below reviewed personally  < from: MR Lumbar Spine w/wo IV Cont (08.19.19 @ 11:13) >  IMPRESSION: Redemonstrated abnormal signal involving the L3 vertebral   body with associated enhancement and edema which has gradually progressed   from the prior exams. There is a small amount of prevertebral soft tissue   swelling/enhancement. Findings favor evolving osteomyelitis.    No evidence of epidural abscess.

## 2019-08-26 NOTE — PROGRESS NOTE ADULT - SUBJECTIVE AND OBJECTIVE BOX
MARCIO LUZ  54y  Male      Patient is a 54y old  Male who presents with a chief complaint of back pain and sciatica (26 Aug 2019 17:46)  Patient claims thet back pain is not improving.no weakness,numbness in legs.no fever,no sob,no cp.Pain meds adjusted today.    REVIEW OF SYSTEMS:  as above  INTERVAL HPI/OVERNIGHT EVENTS:  T(C): 37.4 (08-26-19 @ 21:36), Max: 37.4 (08-26-19 @ 21:36)  HR: 81 (08-26-19 @ 21:36) (65 - 81)  BP: 139/97 (08-26-19 @ 21:36) (139/97 - 160/108)  RR: 18 (08-26-19 @ 21:36) (17 - 18)  SpO2: 99% (08-26-19 @ 21:36) (99% - 100%)  Wt(kg): --  I&O's Summary    25 Aug 2019 07:01  -  26 Aug 2019 07:00  --------------------------------------------------------  IN: 600 mL / OUT: 900 mL / NET: -300 mL      T(C): 37.4 (08-26-19 @ 21:36), Max: 37.4 (08-26-19 @ 21:36)  HR: 81 (08-26-19 @ 21:36) (65 - 81)  BP: 139/97 (08-26-19 @ 21:36) (139/97 - 160/108)  RR: 18 (08-26-19 @ 21:36) (17 - 18)  SpO2: 99% (08-26-19 @ 21:36) (99% - 100%)  Wt(kg): --Vital Signs Last 24 Hrs  T(C): 37.4 (26 Aug 2019 21:36), Max: 37.4 (26 Aug 2019 21:36)  T(F): 99.3 (26 Aug 2019 21:36), Max: 99.3 (26 Aug 2019 21:36)  HR: 81 (26 Aug 2019 21:36) (65 - 81)  BP: 139/97 (26 Aug 2019 21:36) (139/97 - 160/108)  BP(mean): --  RR: 18 (26 Aug 2019 21:36) (17 - 18)  SpO2: 99% (26 Aug 2019 21:36) (99% - 100%)    LABS:              CAPILLARY BLOOD GLUCOSE                PAST MEDICAL & SURGICAL HISTORY:  Sciatica  S/P ORIF (open reduction internal fixation) fracture      MEDICATIONS  (STANDING):  chlorhexidine 4% Liquid 1 Application(s) Topical <User Schedule>  enalapril 20 milliGRAM(s) Oral every 12 hours  gabapentin 800 milliGRAM(s) Oral three times a day  hydrALAZINE 25 milliGRAM(s) Oral every 12 hours  lidocaine   Patch 1 Patch Transdermal daily  melatonin 6 milliGRAM(s) Oral at bedtime  multivitamin      multivitamin 1 Tablet(s) Oral daily  nafcillin  IVPB      nafcillin  IVPB 2 Gram(s) IV Intermittent every 4 hours  nicotine -  14 mG/24Hr(s) Patch 1 patch Transdermal daily  nortriptyline 25 milliGRAM(s) Oral at bedtime  oxyCODONE  ER Tablet 20 milliGRAM(s) Oral every 12 hours  pantoprazole    Tablet 40 milliGRAM(s) Oral before breakfast    MEDICATIONS  (PRN):  cloNIDine 0.1 milliGRAM(s) Oral two times a day PRN withdrawl symptoms  hydrOXYzine hydrochloride 50 milliGRAM(s) Oral every 8 hours PRN Anxiety  oxyCODONE    5 mG/acetaminophen 325 mG 2 Tablet(s) Oral every 6 hours PRN Severe Pain (7 - 10)  tiZANidine 4 milliGRAM(s) Oral every 6 hours PRN Muscle Spasm        RADIOLOGY & ADDITIONAL TESTS:    Imaging Personally Reviewed:  [ ] YES  [ ] NO    Consultant(s) Notes Reviewed:  [x ] YES  [ ] NO    PHYSICAL EXAM:  GENERAL: NAD, well-groomed, well-developed  HEAD:  Atraumatic, Normocephalic  EYES: EOMI, PERRLA, conjunctiva and sclera clear  ENMT: No tonsillar erythema, exudates, or enlargement; Moist mucous membranes, Good dentition, No lesions  NECK: Supple, No JVD, Normal thyroid  NERVOUS SYSTEM:  Alert & Oriented X3, Good concentration; Motor Strength 5/5 B/L upper and lower extremities; DTRs 2+ intact and symmetric  CHEST/LUNG: Clear to percussion bilaterally; No rales, rhonchi, wheezing, or rubs back-mild tenderness lower back  HEART: Regular rate and rhythm; No murmurs, rubs, or gallops  ABDOMEN: Soft, Nontender, Nondistended; Bowel sounds present  EXTREMITIES:  2+ Peripheral Pulses, No clubbing, cyanosis, or edema  LYMPH: No lymphadenopathy noted  SKIN: No rashes or lesions    Care Discussed with Consultants/Other Providers [ ] YES  [ ] NO      Code Status: [] Full Code [] DNR [] DNI [] Goals of Care:   Disposition: [] ICU [] Stroke Unit [] RCU []PCU []Floor [] Discharge Home         CECY [email or web address].FACP

## 2019-08-26 NOTE — CONSULT NOTE ADULT - SUBJECTIVE AND OBJECTIVE BOX
Requested to followup on this patient for chronic pain management.    HPI: [id number]  Handoff  MEWS Score  Sciatica  Sciatica  Acute right-sided low back pain with right-sided sciatica  Heroin dependence  Sciatica  Heroin dependence  Moderate protein-calorie malnutrition  Heroin dependence  Acute right-sided low back pain with right-sided sciatica  S/P ORIF (open reduction internal fixation) fracture  No significant past surgical history  No significant past surgical history  LOWER BACK AND LEG PAIN  Moderate protein-calorie malnutrition  Heroin dependence      chlorhexidine 4% Liquid 1 Application(s) Topical <User Schedule>  cloNIDine 0.1 milliGRAM(s) Oral two times a day PRN  enalapril 20 milliGRAM(s) Oral every 12 hours  gabapentin 800 milliGRAM(s) Oral three times a day  hydrOXYzine hydrochloride 50 milliGRAM(s) Oral every 8 hours PRN  lidocaine   Patch 1 Patch Transdermal daily  melatonin 6 milliGRAM(s) Oral at bedtime  multivitamin      multivitamin 1 Tablet(s) Oral daily  nafcillin  IVPB      nafcillin  IVPB 2 Gram(s) IV Intermittent every 4 hours  nicotine -  14 mG/24Hr(s) Patch 1 patch Transdermal daily  nortriptyline 25 milliGRAM(s) Oral at bedtime  oxyCODONE    5 mG/acetaminophen 325 mG 2 Tablet(s) Oral every 6 hours PRN  pantoprazole    Tablet 40 milliGRAM(s) Oral before breakfast  tiZANidine 4 milliGRAM(s) Oral every 6 hours PRN      No Known Allergies      ketorolac   Injectable: [Ordered as TORADOL Injectable]  30 milliGRAM(s), IV Push, once, Stop After 1 Doses  Administration Instructions: IF IV PUSH, ADMINISTER OVER 1 MINUTE  Dispose unused medication in BLACK bin.  Provider's Contact #: [phone] (08-25-19 @ 17:27)  Chart Check (08-25-19 @ 19:09)  ketorolac   Injectable: [Ordered as TORADOL Injectable]  15 milliGRAM(s), IV Push, once, Stop After 1 Doses  Administration Instructions: IF IV PUSH, ADMINISTER OVER 1 MINUTE (08-25-19 @ 23:39)  ketorolac   Injectable: [Ordered as TORADOL Injectable]  15 milliGRAM(s), IV Push, once, Stop After 1 Doses  Administration Instructions: IF IV PUSH, ADMINISTER OVER 1 MINUTE (08-26-19 @ 00:26)  ketorolac   Injectable: [Ordered as TORADOL Injectable]  30 milliGRAM(s), IV Push, once, Stop After 1 Doses  Administration Instructions: IF IV PUSH, ADMINISTER OVER 1 MINUTE  Dispose unused medication in BLACK bin. (08-26-19 @ 06:05)  Chart Check (08-26-19 @ 07:55)  Chart Check (08-26-19 @ 07:59)      Summary:  Patient seen at bedside.  Patient complaining of 1000/10 pain on his lower back.  Patient wants to be on Tramadol 50 mg instead of Tramadol 25 mg to help with his pain. Discussed patient with Dr. Rodriguez who prescribed Tramadol and  he would like to know pain management recommendations to help patient manage his pain.             PHYSICAL EXAM:  GENERAL: Alert & Oriented x 3 in NAD, well-groomed, well-developed     Impression/Plan: Requested by ADS to help manage pain.   Recommendations:  1) Consider starting patient on Oxycontin 20 mg every 12 hours.  Hold for oversedation.   2) Consider starting patient on TENS therapy with physical therapy.  Please call back pain service 2 weeks prior to discharge to maybe wean patient off Oxycontin if needed.  Advised patient to make appt with chronic pain doctor upon discharge.  Discussed patient with Anesthesia attending and agrees with above recommendations.  Patient was seen 08-26-19 @ 13:30 Requested to followup on this patient for chronic pain management.    HPI: [id number]  Handoff  MEWS Score  Sciatica  Sciatica  Acute right-sided low back pain with right-sided sciatica  Heroin dependence  Sciatica  Heroin dependence  Moderate protein-calorie malnutrition  Heroin dependence  Acute right-sided low back pain with right-sided sciatica  S/P ORIF (open reduction internal fixation) fracture  No significant past surgical history  No significant past surgical history  LOWER BACK AND LEG PAIN  Moderate protein-calorie malnutrition  Heroin dependence      chlorhexidine 4% Liquid 1 Application(s) Topical <User Schedule>  cloNIDine 0.1 milliGRAM(s) Oral two times a day PRN  enalapril 20 milliGRAM(s) Oral every 12 hours  gabapentin 800 milliGRAM(s) Oral three times a day  hydrOXYzine hydrochloride 50 milliGRAM(s) Oral every 8 hours PRN  lidocaine   Patch 1 Patch Transdermal daily  melatonin 6 milliGRAM(s) Oral at bedtime  multivitamin      multivitamin 1 Tablet(s) Oral daily  nafcillin  IVPB      nafcillin  IVPB 2 Gram(s) IV Intermittent every 4 hours  nicotine -  14 mG/24Hr(s) Patch 1 patch Transdermal daily  nortriptyline 25 milliGRAM(s) Oral at bedtime  oxyCODONE    5 mG/acetaminophen 325 mG 2 Tablet(s) Oral every 6 hours PRN  pantoprazole    Tablet 40 milliGRAM(s) Oral before breakfast  tiZANidine 4 milliGRAM(s) Oral every 6 hours PRN      No Known Allergies      ketorolac   Injectable: [Ordered as TORADOL Injectable]  30 milliGRAM(s), IV Push, once, Stop After 1 Doses  Administration Instructions: IF IV PUSH, ADMINISTER OVER 1 MINUTE  Dispose unused medication in BLACK bin.  Provider's Contact #: [phone] (08-25-19 @ 17:27)  Chart Check (08-25-19 @ 19:09)  ketorolac   Injectable: [Ordered as TORADOL Injectable]  15 milliGRAM(s), IV Push, once, Stop After 1 Doses  Administration Instructions: IF IV PUSH, ADMINISTER OVER 1 MINUTE (08-25-19 @ 23:39)  ketorolac   Injectable: [Ordered as TORADOL Injectable]  15 milliGRAM(s), IV Push, once, Stop After 1 Doses  Administration Instructions: IF IV PUSH, ADMINISTER OVER 1 MINUTE (08-26-19 @ 00:26)  ketorolac   Injectable: [Ordered as TORADOL Injectable]  30 milliGRAM(s), IV Push, once, Stop After 1 Doses  Administration Instructions: IF IV PUSH, ADMINISTER OVER 1 MINUTE  Dispose unused medication in BLACK bin. (08-26-19 @ 06:05)  Chart Check (08-26-19 @ 07:55)  Chart Check (08-26-19 @ 07:59)      Summary:  Patient seen at bedside.  Patient complaining of 1000/10 pain on his lower back.  Patient wants to be on Tramadol 50 mg instead of Tramadol 25 mg to help with his pain. Discussed patient with Dr. Rodriguez who prescribed Tramadol and  he would like to know pain management recommendations to help patient manage his pain.             PHYSICAL EXAM:  GENERAL: Alert & Oriented x 3 in NAD, well-groomed, well-developed     Impression/Plan: Requested by ADS to help manage pain.   Recommendations:  1) Consider discontinuing Tramadol orders, instead start patient on Oxycontin 20 mg every 12 hours.  Hold for oversedation.   2) Consider starting patient on TENS therapy with physical therapy.  Please call back pain service 2 weeks prior to discharge to maybe wean patient off Oxycontin if needed.  Advised patient to make appt with chronic pain doctor upon discharge.  Discussed patient with Anesthesia attending and agrees with above recommendations.  Patient was seen 08-26-19 @ 13:30 Requested to followup on this patient for chronic pain management.    HPI: [id number]  Handoff  MEWS Score  Sciatica  Sciatica  Acute right-sided low back pain with right-sided sciatica  Heroin dependence  Sciatica  Heroin dependence  Moderate protein-calorie malnutrition  Heroin dependence  Acute right-sided low back pain with right-sided sciatica  S/P ORIF (open reduction internal fixation) fracture  No significant past surgical history  No significant past surgical history  LOWER BACK AND LEG PAIN  Moderate protein-calorie malnutrition  Heroin dependence      chlorhexidine 4% Liquid 1 Application(s) Topical <User Schedule>  cloNIDine 0.1 milliGRAM(s) Oral two times a day PRN  enalapril 20 milliGRAM(s) Oral every 12 hours  gabapentin 800 milliGRAM(s) Oral three times a day  hydrOXYzine hydrochloride 50 milliGRAM(s) Oral every 8 hours PRN  lidocaine   Patch 1 Patch Transdermal daily  melatonin 6 milliGRAM(s) Oral at bedtime  multivitamin      multivitamin 1 Tablet(s) Oral daily  nafcillin  IVPB      nafcillin  IVPB 2 Gram(s) IV Intermittent every 4 hours  nicotine -  14 mG/24Hr(s) Patch 1 patch Transdermal daily  nortriptyline 25 milliGRAM(s) Oral at bedtime  oxyCODONE    5 mG/acetaminophen 325 mG 2 Tablet(s) Oral every 6 hours PRN  pantoprazole    Tablet 40 milliGRAM(s) Oral before breakfast  tiZANidine 4 milliGRAM(s) Oral every 6 hours PRN      No Known Allergies      ketorolac   Injectable: [Ordered as TORADOL Injectable]  30 milliGRAM(s), IV Push, once, Stop After 1 Doses  Administration Instructions: IF IV PUSH, ADMINISTER OVER 1 MINUTE  Dispose unused medication in BLACK bin.  Provider's Contact #: [phone] (08-25-19 @ 17:27)  Chart Check (08-25-19 @ 19:09)  ketorolac   Injectable: [Ordered as TORADOL Injectable]  15 milliGRAM(s), IV Push, once, Stop After 1 Doses  Administration Instructions: IF IV PUSH, ADMINISTER OVER 1 MINUTE (08-25-19 @ 23:39)  ketorolac   Injectable: [Ordered as TORADOL Injectable]  15 milliGRAM(s), IV Push, once, Stop After 1 Doses  Administration Instructions: IF IV PUSH, ADMINISTER OVER 1 MINUTE (08-26-19 @ 00:26)  ketorolac   Injectable: [Ordered as TORADOL Injectable]  30 milliGRAM(s), IV Push, once, Stop After 1 Doses  Administration Instructions: IF IV PUSH, ADMINISTER OVER 1 MINUTE  Dispose unused medication in BLACK bin. (08-26-19 @ 06:05)  Chart Check (08-26-19 @ 07:55)  Chart Check (08-26-19 @ 07:59)      Summary:  Patient seen at bedside.  Patient complaining of 1000/10 pain on his lower back.  Patient wants to be on Tramadol 50 mg instead of Tramadol 25 mg to help with his pain. Discussed patient with Dr. Rodriguez who prescribed Tramadol and  he would like to know pain management recommendations to help patient manage his pain.             PHYSICAL EXAM:  GENERAL: Alert & Oriented x 3 in NAD, well-groomed, well-developed     Impression/Plan: Requested by ADS to help manage pain.   Recommendations:  1) Consider discontinuing Tramadol orders, instead start patient on Oxycontin 20 mg every 12 hours.  Hold for oversedation.   2) If Oxycontin ordered, change Percocet order details to for breakthrough pain prn.   2) Consider starting patient on TENS therapy with physical therapy.  3) Consider continuous pulse oximetry since patient on long acting opioid.  Please call back pain service 2 weeks prior to discharge to maybe wean patient off Oxycontin if needed.  Advised patient to make appt with chronic pain doctor upon discharge.  Discussed patient with Anesthesia attending and agrees with above recommendations.  Patient was seen 08-26-19 @ 13:30 Requested to followup on this patient for chronic pain management.    HPI: [id number]  Handoff  MEWS Score  Sciatica  Sciatica  Acute right-sided low back pain with right-sided sciatica  Heroin dependence  Sciatica  Heroin dependence  Moderate protein-calorie malnutrition  Heroin dependence  Acute right-sided low back pain with right-sided sciatica  S/P ORIF (open reduction internal fixation) fracture  No significant past surgical history  No significant past surgical history  LOWER BACK AND LEG PAIN  Moderate protein-calorie malnutrition  Heroin dependence      chlorhexidine 4% Liquid 1 Application(s) Topical <User Schedule>  cloNIDine 0.1 milliGRAM(s) Oral two times a day PRN  enalapril 20 milliGRAM(s) Oral every 12 hours  gabapentin 800 milliGRAM(s) Oral three times a day  hydrOXYzine hydrochloride 50 milliGRAM(s) Oral every 8 hours PRN  lidocaine   Patch 1 Patch Transdermal daily  melatonin 6 milliGRAM(s) Oral at bedtime  multivitamin      multivitamin 1 Tablet(s) Oral daily  nafcillin  IVPB      nafcillin  IVPB 2 Gram(s) IV Intermittent every 4 hours  nicotine -  14 mG/24Hr(s) Patch 1 patch Transdermal daily  nortriptyline 25 milliGRAM(s) Oral at bedtime  oxyCODONE    5 mG/acetaminophen 325 mG 2 Tablet(s) Oral every 6 hours PRN  pantoprazole    Tablet 40 milliGRAM(s) Oral before breakfast  tiZANidine 4 milliGRAM(s) Oral every 6 hours PRN      No Known Allergies      ketorolac   Injectable: [Ordered as TORADOL Injectable]  30 milliGRAM(s), IV Push, once, Stop After 1 Doses  Administration Instructions: IF IV PUSH, ADMINISTER OVER 1 MINUTE  Dispose unused medication in BLACK bin.  Provider's Contact #: [phone] (08-25-19 @ 17:27)  Chart Check (08-25-19 @ 19:09)  ketorolac   Injectable: [Ordered as TORADOL Injectable]  15 milliGRAM(s), IV Push, once, Stop After 1 Doses  Administration Instructions: IF IV PUSH, ADMINISTER OVER 1 MINUTE (08-25-19 @ 23:39)  ketorolac   Injectable: [Ordered as TORADOL Injectable]  15 milliGRAM(s), IV Push, once, Stop After 1 Doses  Administration Instructions: IF IV PUSH, ADMINISTER OVER 1 MINUTE (08-26-19 @ 00:26)  ketorolac   Injectable: [Ordered as TORADOL Injectable]  30 milliGRAM(s), IV Push, once, Stop After 1 Doses  Administration Instructions: IF IV PUSH, ADMINISTER OVER 1 MINUTE  Dispose unused medication in BLACK bin. (08-26-19 @ 06:05)  Chart Check (08-26-19 @ 07:55)  Chart Check (08-26-19 @ 07:59)      Summary:  Patient seen at bedside.  Patient complaining of 1000/10 pain on his lower back.  Patient wants to be on Tramadol 50 mg instead of Tramadol 25 mg to help with his pain. Discussed patient with Dr. Rodriguez who prescribed Tramadol and  he would like to know pain management recommendations to help patient manage his pain.             PHYSICAL EXAM:  GENERAL: Alert & Oriented x 3 in NAD, well-groomed, well-developed     Impression/Plan: Requested by ADS to help manage pain.   Recommendations:  1) Consider discontinuing Tramadol orders, instead start patient on Oxycontin 20 mg every 12 hours.  Hold for oversedation.   2) If Oxycontin ordered, change Percocet order details to for breakthrough pain prn.   2) Consider starting patient on TENS therapy with physical therapy.  3) Consider continuous pulse oximetry since patient on long acting opioid.  Please call back pain service 2 weeks prior to discharge to maybe wean patient off Oxycontin if needed.  Advised patient to make appt with chronic pain doctor upon discharge.  Called Chronic pain attending, awaiting call back and inform him about the above recommendations.  Patient was seen 08-26-19 @ 13:30

## 2019-08-26 NOTE — PROVIDER CONTACT NOTE (OTHER) - ASSESSMENT
pt aaox4 does not appear in distress - pt displays no signs of trouble breathing or sob. Pt reports midsternal chest pain.

## 2019-08-26 NOTE — CONSULT NOTE ADULT - REASON FOR ADMISSION
back pain and sciatica

## 2019-08-26 NOTE — PROGRESS NOTE ADULT - SUBJECTIVE AND OBJECTIVE BOX
LUZ BUCKLEY:[id number],   54yMale followed for:  No Known Allergies    PAST MEDICAL & SURGICAL HISTORY:  Sciatica  S/P ORIF (open reduction internal fixation) fracture    FAMILY HISTORY:    MEDICATIONS  (STANDING):  chlorhexidine 4% Liquid 1 Application(s) Topical <User Schedule>  enalapril 20 milliGRAM(s) Oral every 12 hours  gabapentin 800 milliGRAM(s) Oral three times a day  lidocaine   Patch 1 Patch Transdermal daily  melatonin 6 milliGRAM(s) Oral at bedtime  multivitamin      multivitamin 1 Tablet(s) Oral daily  nafcillin  IVPB      nafcillin  IVPB 2 Gram(s) IV Intermittent every 4 hours  nicotine -  14 mG/24Hr(s) Patch 1 patch Transdermal daily  nortriptyline 25 milliGRAM(s) Oral at bedtime  pantoprazole    Tablet 40 milliGRAM(s) Oral before breakfast    MEDICATIONS  (PRN):  cloNIDine 0.1 milliGRAM(s) Oral two times a day PRN withdrawl symptoms  hydrOXYzine hydrochloride 50 milliGRAM(s) Oral every 8 hours PRN Anxiety  oxyCODONE    5 mG/acetaminophen 325 mG 2 Tablet(s) Oral every 6 hours PRN Severe Pain (7 - 10)  tiZANidine 4 milliGRAM(s) Oral every 6 hours PRN Muscle Spasm  traMADol 25 milliGRAM(s) Oral every 8 hours PRN Severe Pain (7 - 10)      Vital Signs Last 24 Hrs  T(C): 36.8 (26 Aug 2019 05:14), Max: 37 (25 Aug 2019 14:12)  T(F): 98.3 (26 Aug 2019 05:14), Max: 98.6 (25 Aug 2019 14:12)  HR: 65 (26 Aug 2019 06:14) (65 - 72)  BP: 160/103 (26 Aug 2019 06:14) (141/93 - 160/108)  BP(mean): --  RR: 18 (26 Aug 2019 06:14) (16 - 18)  SpO2: 100% (26 Aug 2019 06:14) (100% - 100%)  nc/at  s1s2  cta  soft, nt, nd no guarding or rebound  no c/c/e

## 2019-08-27 PROCEDURE — 99232 SBSQ HOSP IP/OBS MODERATE 35: CPT

## 2019-08-27 RX ADMIN — Medication 6 MILLIGRAM(S): at 00:05

## 2019-08-27 RX ADMIN — OXYCODONE HYDROCHLORIDE 20 MILLIGRAM(S): 5 TABLET ORAL at 14:20

## 2019-08-27 RX ADMIN — OXYCODONE AND ACETAMINOPHEN 2 TABLET(S): 5; 325 TABLET ORAL at 00:40

## 2019-08-27 RX ADMIN — GABAPENTIN 800 MILLIGRAM(S): 400 CAPSULE ORAL at 22:41

## 2019-08-27 RX ADMIN — NAFCILLIN 200 GRAM(S): 10 INJECTION, POWDER, FOR SOLUTION INTRAVENOUS at 18:24

## 2019-08-27 RX ADMIN — Medication 20 MILLIGRAM(S): at 18:22

## 2019-08-27 RX ADMIN — NAFCILLIN 200 GRAM(S): 10 INJECTION, POWDER, FOR SOLUTION INTRAVENOUS at 02:00

## 2019-08-27 RX ADMIN — NAFCILLIN 200 GRAM(S): 10 INJECTION, POWDER, FOR SOLUTION INTRAVENOUS at 10:30

## 2019-08-27 RX ADMIN — TIZANIDINE 4 MILLIGRAM(S): 4 TABLET ORAL at 13:53

## 2019-08-27 RX ADMIN — Medication 1 PATCH: at 22:42

## 2019-08-27 RX ADMIN — Medication 20 MILLIGRAM(S): at 05:44

## 2019-08-27 RX ADMIN — GABAPENTIN 800 MILLIGRAM(S): 400 CAPSULE ORAL at 06:29

## 2019-08-27 RX ADMIN — OXYCODONE HYDROCHLORIDE 20 MILLIGRAM(S): 5 TABLET ORAL at 01:59

## 2019-08-27 RX ADMIN — TIZANIDINE 4 MILLIGRAM(S): 4 TABLET ORAL at 00:05

## 2019-08-27 RX ADMIN — Medication 25 MILLIGRAM(S): at 18:24

## 2019-08-27 RX ADMIN — CHLORHEXIDINE GLUCONATE 1 APPLICATION(S): 213 SOLUTION TOPICAL at 10:30

## 2019-08-27 RX ADMIN — OXYCODONE AND ACETAMINOPHEN 2 TABLET(S): 5; 325 TABLET ORAL at 12:22

## 2019-08-27 RX ADMIN — OXYCODONE AND ACETAMINOPHEN 2 TABLET(S): 5; 325 TABLET ORAL at 00:06

## 2019-08-27 RX ADMIN — Medication 1 TABLET(S): at 11:43

## 2019-08-27 RX ADMIN — Medication 25 MILLIGRAM(S): at 05:44

## 2019-08-27 RX ADMIN — Medication 1 PATCH: at 21:21

## 2019-08-27 RX ADMIN — GABAPENTIN 800 MILLIGRAM(S): 400 CAPSULE ORAL at 13:51

## 2019-08-27 RX ADMIN — OXYCODONE AND ACETAMINOPHEN 2 TABLET(S): 5; 325 TABLET ORAL at 18:26

## 2019-08-27 RX ADMIN — Medication 1 PATCH: at 06:24

## 2019-08-27 RX ADMIN — NAFCILLIN 200 GRAM(S): 10 INJECTION, POWDER, FOR SOLUTION INTRAVENOUS at 22:37

## 2019-08-27 RX ADMIN — TIZANIDINE 4 MILLIGRAM(S): 4 TABLET ORAL at 22:44

## 2019-08-27 RX ADMIN — OXYCODONE AND ACETAMINOPHEN 2 TABLET(S): 5; 325 TABLET ORAL at 07:05

## 2019-08-27 RX ADMIN — Medication 1 PATCH: at 19:00

## 2019-08-27 RX ADMIN — OXYCODONE AND ACETAMINOPHEN 2 TABLET(S): 5; 325 TABLET ORAL at 13:00

## 2019-08-27 RX ADMIN — OXYCODONE HYDROCHLORIDE 20 MILLIGRAM(S): 5 TABLET ORAL at 02:38

## 2019-08-27 RX ADMIN — OXYCODONE HYDROCHLORIDE 20 MILLIGRAM(S): 5 TABLET ORAL at 13:51

## 2019-08-27 RX ADMIN — Medication 6 MILLIGRAM(S): at 22:41

## 2019-08-27 RX ADMIN — OXYCODONE AND ACETAMINOPHEN 2 TABLET(S): 5; 325 TABLET ORAL at 06:29

## 2019-08-27 RX ADMIN — NAFCILLIN 200 GRAM(S): 10 INJECTION, POWDER, FOR SOLUTION INTRAVENOUS at 06:30

## 2019-08-27 RX ADMIN — NAFCILLIN 200 GRAM(S): 10 INJECTION, POWDER, FOR SOLUTION INTRAVENOUS at 13:51

## 2019-08-27 RX ADMIN — NORTRIPTYLINE HYDROCHLORIDE 25 MILLIGRAM(S): 10 CAPSULE ORAL at 22:41

## 2019-08-27 RX ADMIN — OXYCODONE AND ACETAMINOPHEN 2 TABLET(S): 5; 325 TABLET ORAL at 19:25

## 2019-08-27 NOTE — PROGRESS NOTE ADULT - SUBJECTIVE AND OBJECTIVE BOX
LUZ BUCKLEY:[id number],   54yMale followed for:  No Known Allergies    PAST MEDICAL & SURGICAL HISTORY:  Sciatica  S/P ORIF (open reduction internal fixation) fracture    FAMILY HISTORY:    MEDICATIONS  (STANDING):  chlorhexidine 4% Liquid 1 Application(s) Topical <User Schedule>  enalapril 20 milliGRAM(s) Oral every 12 hours  gabapentin 800 milliGRAM(s) Oral three times a day  hydrALAZINE 25 milliGRAM(s) Oral every 12 hours  lidocaine   Patch 1 Patch Transdermal daily  melatonin 6 milliGRAM(s) Oral at bedtime  multivitamin      multivitamin 1 Tablet(s) Oral daily  nafcillin  IVPB      nafcillin  IVPB 2 Gram(s) IV Intermittent every 4 hours  nicotine -  14 mG/24Hr(s) Patch 1 patch Transdermal daily  nortriptyline 25 milliGRAM(s) Oral at bedtime  oxyCODONE  ER Tablet 20 milliGRAM(s) Oral every 12 hours  pantoprazole    Tablet 40 milliGRAM(s) Oral before breakfast    MEDICATIONS  (PRN):  cloNIDine 0.1 milliGRAM(s) Oral two times a day PRN withdrawl symptoms  hydrOXYzine hydrochloride 50 milliGRAM(s) Oral every 8 hours PRN Anxiety  oxyCODONE    5 mG/acetaminophen 325 mG 2 Tablet(s) Oral every 6 hours PRN Severe Pain (7 - 10)  tiZANidine 4 milliGRAM(s) Oral every 6 hours PRN Muscle Spasm      Vital Signs Last 24 Hrs  T(C): 36.6 (27 Aug 2019 05:42), Max: 37.4 (26 Aug 2019 21:36)  T(F): 97.8 (27 Aug 2019 05:42), Max: 99.3 (26 Aug 2019 21:36)  HR: 70 (27 Aug 2019 05:42) (70 - 81)  BP: 140/95 (27 Aug 2019 05:42) (139/97 - 153/99)  BP(mean): --  RR: 18 (27 Aug 2019 05:42) (18 - 18)  SpO2: 100% (27 Aug 2019 05:42) (99% - 100%)  nc/at  s1s2  cta  soft, nt, nd no guarding or rebound  no c/c/e

## 2019-08-27 NOTE — PROGRESS NOTE ADULT - SUBJECTIVE AND OBJECTIVE BOX
Follow Up:  MSSA bacteremia    Interval History: pt stable and afebrile, addiction psych was called to help with the pain meds    ROS:      All other systems negative    Constitutional: no fever, no chills, no weight loss, no night sweats  Eye: no eye pain, no redness, no vision changes  ENT:  no sore throat, no rhinorrhea  Cardiovascular:  no chest pain, no palpitation  Respiratory:  no SOB, no cough  GI:  no abd pain, no vomiting, no diarrhea  urinary: no dysuria, no hematuria, no flank pain  : no penile discharge or bleeding  musculoskeletal:  severe back pain  skin:  no rash  neurology:  no headache, no seizure, no change in mental status          Allergies  No Known Allergies        ANTIMICROBIALS:  nafcillin  IVPB    nafcillin  IVPB 2 every 4 hours      OTHER MEDS:  chlorhexidine 4% Liquid 1 Application(s) Topical <User Schedule>  cloNIDine 0.1 milliGRAM(s) Oral two times a day PRN  enalapril 20 milliGRAM(s) Oral every 12 hours  gabapentin 800 milliGRAM(s) Oral three times a day  hydrALAZINE 25 milliGRAM(s) Oral every 12 hours  hydrOXYzine hydrochloride 50 milliGRAM(s) Oral every 8 hours PRN  lidocaine   Patch 1 Patch Transdermal daily  melatonin 6 milliGRAM(s) Oral at bedtime  multivitamin      multivitamin 1 Tablet(s) Oral daily  nicotine -  14 mG/24Hr(s) Patch 1 patch Transdermal daily  nortriptyline 25 milliGRAM(s) Oral at bedtime  oxyCODONE    5 mG/acetaminophen 325 mG 2 Tablet(s) Oral every 6 hours PRN  oxyCODONE  ER Tablet 20 milliGRAM(s) Oral every 12 hours  pantoprazole    Tablet 40 milliGRAM(s) Oral before breakfast  tiZANidine 4 milliGRAM(s) Oral every 6 hours PRN      Vital Signs Last 24 Hrs  T(C): 36.8 (27 Aug 2019 14:39), Max: 37.4 (26 Aug 2019 21:36)  T(F): 98.3 (27 Aug 2019 14:39), Max: 99.3 (26 Aug 2019 21:36)  HR: 70 (27 Aug 2019 14:39) (68 - 81)  BP: 138/94 (27 Aug 2019 14:39) (138/94 - 140/95)  BP(mean): --  RR: 20 (27 Aug 2019 14:39) (18 - 20)  SpO2: 100% (27 Aug 2019 14:39) (99% - 100%)    Physical Exam:  General:    NAD, non toxic  Head: atraumatic, normocephalic  Eyes: normal sclera and conjunctiva  ENT:   no oropharyngeal lesions, no LAD, neck supple  Cardio:    regular S1,S2  Respiratory:   clear b/l, no wheezing  abd:   soft, BS +, not tender, no hepatosplenomegaly  :     no CVAT, no suprapubic tenderness, no romero  Musculoskeletal : RLE scar from ORIF but no tenderness  Skin:    no rash  vascular: no phlebitis, normal pulses  Neurologic:     no focal deficits  psych: normal affect                    MICROBIOLOGY:  v  BLOOD PERIPHERAL  08-12-19 --  --  --      BLOOD VENOUS  08-10-19 --  --  --      BLOOD  08-08-19 --  --  --      BLOOD  08-07-19 --  --  Staphylococcus aureus  BLOOD CULTURE PCR                RADIOLOGY:  Images below reviewed personally  < from: MR Lumbar Spine w/wo IV Cont (08.19.19 @ 11:13) >    IMPRESSION: Redemonstrated abnormal signal involving the L3 vertebral   body with associated enhancement and edema which has gradually progressed   from the prior exams. There is a small amount of prevertebral soft tissue   swelling/enhancement. Findings favor evolving osteomyelitis.    No evidence of epidural abscess.

## 2019-08-27 NOTE — PROGRESS NOTE ADULT - ASSESSMENT
53 yo male active heroin use with chronic back pain, kidney cyst a/w acute on chronic back pain with sciatica with difficulty ambulating due to pain 8/4. patient found to have s. aureus bacteremia on cefazolin iv. nephrology consulted for ARNOLDO.     ARNOLDO  peaked 1.79   ARNOLDO likely prerenal on NSAIDs and lisinopril  renal function improved with IVF  off ibuprofen.   renal us done showed left renal cyst  Pt currently on enalapril 20 bid  now off ketorolac iv for pain (8/26/19)   off IVF  renal function stable   monitor bmp, urine output     HTN  controlled  monitor bmp and bp    Hep C  f/u GI  outpatient treatment    Proteinuria  mild  p/c ratio <300, Monitor  hep c +

## 2019-08-27 NOTE — PROGRESS NOTE ADULT - SUBJECTIVE AND OBJECTIVE BOX
MARCIO LUZ  54y  Male      Patient is a 54y old  Male who presents with a chief complaint of back pain and sciatica (27 Aug 2019 17:08)  feels better.no sob,no cp,no fever,no cough.Back pain is better today.    REVIEW OF SYSTEMS:  neg      INTERVAL HPI/OVERNIGHT EVENTS:  T(C): 36.8 (08-27-19 @ 14:39), Max: 37.4 (08-26-19 @ 21:36)  HR: 70 (08-27-19 @ 14:39) (68 - 81)  BP: 138/94 (08-27-19 @ 14:39) (138/94 - 140/95)  RR: 20 (08-27-19 @ 14:39) (18 - 20)  SpO2: 100% (08-27-19 @ 14:39) (99% - 100%)  Wt(kg): --  I&O's Summary    T(C): 36.8 (08-27-19 @ 14:39), Max: 37.4 (08-26-19 @ 21:36)  HR: 70 (08-27-19 @ 14:39) (68 - 81)  BP: 138/94 (08-27-19 @ 14:39) (138/94 - 140/95)  RR: 20 (08-27-19 @ 14:39) (18 - 20)  SpO2: 100% (08-27-19 @ 14:39) (99% - 100%)  Wt(kg): --Vital Signs Last 24 Hrs  T(C): 36.8 (27 Aug 2019 14:39), Max: 37.4 (26 Aug 2019 21:36)  T(F): 98.3 (27 Aug 2019 14:39), Max: 99.3 (26 Aug 2019 21:36)  HR: 70 (27 Aug 2019 14:39) (68 - 81)  BP: 138/94 (27 Aug 2019 14:39) (138/94 - 140/95)  BP(mean): --  RR: 20 (27 Aug 2019 14:39) (18 - 20)  SpO2: 100% (27 Aug 2019 14:39) (99% - 100%)    LABS:              CAPILLARY BLOOD GLUCOSE                PAST MEDICAL & SURGICAL HISTORY:  Sciatica  S/P ORIF (open reduction internal fixation) fracture      MEDICATIONS  (STANDING):  chlorhexidine 4% Liquid 1 Application(s) Topical <User Schedule>  enalapril 20 milliGRAM(s) Oral every 12 hours  gabapentin 800 milliGRAM(s) Oral three times a day  hydrALAZINE 25 milliGRAM(s) Oral every 12 hours  lidocaine   Patch 1 Patch Transdermal daily  melatonin 6 milliGRAM(s) Oral at bedtime  multivitamin      multivitamin 1 Tablet(s) Oral daily  nafcillin  IVPB      nafcillin  IVPB 2 Gram(s) IV Intermittent every 4 hours  nicotine -  14 mG/24Hr(s) Patch 1 patch Transdermal daily  nortriptyline 25 milliGRAM(s) Oral at bedtime  oxyCODONE  ER Tablet 20 milliGRAM(s) Oral every 12 hours  pantoprazole    Tablet 40 milliGRAM(s) Oral before breakfast    MEDICATIONS  (PRN):  cloNIDine 0.1 milliGRAM(s) Oral two times a day PRN withdrawl symptoms  hydrOXYzine hydrochloride 50 milliGRAM(s) Oral every 8 hours PRN Anxiety  oxyCODONE    5 mG/acetaminophen 325 mG 2 Tablet(s) Oral every 6 hours PRN Severe Pain (7 - 10)  tiZANidine 4 milliGRAM(s) Oral every 6 hours PRN Muscle Spasm        RADIOLOGY & ADDITIONAL TESTS:    Imaging Personally Reviewed:  [ ] YES  [ ] NO    Consultant(s) Notes Reviewed:  [x ] YES  [ ] NO    PHYSICAL EXAM:  GENERAL: NAD, well-groomed, well-developed  HEAD:  Atraumatic, Normocephalic  EYES: EOMI, PERRLA, conjunctiva and sclera clear  ENMT: No tonsillar erythema, exudates, or enlargement; Moist mucous membranes, Good dentition, No lesions  NECK: Supple, No JVD, Normal thyroid  NERVOUS SYSTEM:  Alert & Oriented X3, nonfocal  CHEST/LUNG: Clear to percussion bilaterally; No rales, rhonchi, wheezing, or rubs  HEART: Regular rate and rhythm; No murmurs, rubs, or gallops  ABDOMEN: Soft, Nontender, Nondistended; Bowel sounds present  EXTREMITIES:  2+ Peripheral Pulses, No clubbing, cyanosis, or edema  LYMPH: No lymphadenopathy noted  SKIN: No rashes or lesions    Care Discussed with Consultants/Other Providers [x ] YES  [ ] NO      Code Status: [] Full Code [] DNR [] DNI [] Goals of Care:   Disposition: [] ICU [] Stroke Unit [] RCU []PCU []Floor [] Discharge Home         JEANINE RodriguezP

## 2019-08-27 NOTE — PROGRESS NOTE ADULT - ASSESSMENT
54 m with IVDA, hepatitis C (not treated), RLE ORIF and hardware, previous bacteremia and lumbar osteo? 2004, presented with worsening back pain, with difficulty ambulating due to pain.  here afebrile normal WBC  blood cx: 2/2 MSSA  hep C viral load: [id number]  spine MRI: enhancing lesion on L3 s/o occult fx or a lesion but CT negative for FX or lesion  hiv negative    MSSA bacteremia and L3 osteo   active heroine use  repeat cx 8/12 negative   TTE negative  repeat MRI 8/19 with progressed enhancement and edema of L3 with perivertebral soft tissue swelling/enhancement, s/o evolving osteo, no epidural abscess      * c/w Nafcillin 2 q 4  * pt can not be discharged with a line in view of active IVDA  * will have continue the course here or another rehab facility  * will do an 8 week course for L3 osteo until 10/7  * f/u with pain management  * will need weekly CBC, CMP, ESR and CRP

## 2019-08-27 NOTE — PROGRESS NOTE ADULT - SUBJECTIVE AND OBJECTIVE BOX
[facility]   MD JEYSON GALAN D.O, PA    TELEPHONE NUMBERS:  OFFICE: [phone]  DR. FORREST CELL: [phone]  MARIUSZ MUÑOZ CELL: [phone]  DR. OCHOA CELL:  [phone]    RENAL FOLLOW UP NOTE  --------------------------------------------------------------------------------  HPI: Pt seen and examined. Pain controlled   Denies SOB, chest pain     PAST HISTORY  --------------------------------------------------------------------------------  No significant changes to PMH, PSH, FHx, SHx, unless otherwise noted    ALLERGIES & MEDICATIONS  --------------------------------------------------------------------------------  Allergies    No Known Allergies    Intolerances      Standing Inpatient Medications  chlorhexidine 4% Liquid 1 Application(s) Topical <User Schedule>  enalapril 20 milliGRAM(s) Oral every 12 hours  gabapentin 800 milliGRAM(s) Oral three times a day  hydrALAZINE 25 milliGRAM(s) Oral every 12 hours  lidocaine   Patch 1 Patch Transdermal daily  melatonin 6 milliGRAM(s) Oral at bedtime  multivitamin      multivitamin 1 Tablet(s) Oral daily  nafcillin  IVPB      nafcillin  IVPB 2 Gram(s) IV Intermittent every 4 hours  nicotine -  14 mG/24Hr(s) Patch 1 patch Transdermal daily  nortriptyline 25 milliGRAM(s) Oral at bedtime  oxyCODONE  ER Tablet 20 milliGRAM(s) Oral every 12 hours  pantoprazole    Tablet 40 milliGRAM(s) Oral before breakfast    PRN Inpatient Medications  cloNIDine 0.1 milliGRAM(s) Oral two times a day PRN  hydrOXYzine hydrochloride 50 milliGRAM(s) Oral every 8 hours PRN  oxyCODONE    5 mG/acetaminophen 325 mG 2 Tablet(s) Oral every 6 hours PRN  tiZANidine 4 milliGRAM(s) Oral every 6 hours PRN      REVIEW OF SYSTEMS  --------------------------------------------------------------------------------  General: no fever  CVS: no chest pain  RESP: no sob, no cough  ABD: no abdominal pain  : no dysuria  MSK: no edema     VITALS/PHYSICAL EXAM  --------------------------------------------------------------------------------  T(C): 36.8 (08-27-19 @ 14:39), Max: 37.4 (08-26-19 @ 21:36)  HR: 70 (08-27-19 @ 14:39) (68 - 81)  BP: 138/94 (08-27-19 @ 14:39) (138/94 - 140/95)  RR: 20 (08-27-19 @ 14:39) (18 - 20)  SpO2: 100% (08-27-19 @ 14:39) (99% - 100%)  Wt(kg): --    Physical Exam:  	Gen: NAD  	HEENT: MMM  	Pulm: CTA B/L  	CV: S1S2  	Abd: Soft, +BS  	Ext: No LE edema B/L                      Neuro: Awake   	Skin: Warm and Dry     LABS/STUDIES  --------------------------------------------------------------------------------    Pending    Creatinine Trend:  SCr 1.11 [08-22 @ 06:00]  SCr 1.04 [08-21 @ 06:25]  SCr 1.02 [08-20 @ 06:00]  SCr 1.05 [08-18 @ 06:15]  SCr 0.91 [08-17 @ 05:10]    Urinalysis - [08-12-19 @ 21:20]      Color YELLOW / Appearance CLEAR / SG 1.033 / pH 5.5      Gluc NEGATIVE / Ketone NEGATIVE  / Bili NEGATIVE / Urobili NORMAL       Blood NEGATIVE / Protein 30 / Leuk Est NEGATIVE / Nitrite NEGATIVE      RBC 0-2 / WBC 3-5 / Hyaline NEGATIVE / Gran  / Sq Epi OCC / Non Sq Epi  / Bacteria NEGATIVE        HCV 9.89, Reactive      [08-05-19 @ 05:25]  HIV Nonreactive The HIV Ag/Ab Combo test performed screens for HIV-1 p24  antigen, antibodies to HIV-1 (group M and group O), and  antibodies to HIV-2. All specimens repeatedly reactive  will reflex to an HIV 1/2 antibody confirmation and  differentiation test. This assay detects p24 antigen which  may be present prior to the development of HIV antibodies,  therefore a reactive result with a negative HIV 1/2 AB  Confirmation should be followed up with HIV-1 RNA, HIV-2  RNA and repeat testing in 4-8 weeks. A nonreactive result  does not preclude previous exposure to or infection with  HIV-1 or HIV-2. [facility] prohibits disclosure of this  result to any unauthorized party.      [08-10-19 @ 06:00]

## 2019-08-28 LAB
ALBUMIN SERPL ELPH-MCNC: 2.9 G/DL — LOW (ref 3.3–5)
ALP SERPL-CCNC: 51 U/L — SIGNIFICANT CHANGE UP (ref 40–120)
ALT FLD-CCNC: 18 U/L — SIGNIFICANT CHANGE UP (ref 4–41)
ANION GAP SERPL CALC-SCNC: 14 MMO/L — SIGNIFICANT CHANGE UP (ref 7–14)
AST SERPL-CCNC: 25 U/L — SIGNIFICANT CHANGE UP (ref 4–40)
BILIRUB SERPL-MCNC: 0.6 MG/DL — SIGNIFICANT CHANGE UP (ref 0.2–1.2)
BUN SERPL-MCNC: 23 MG/DL — SIGNIFICANT CHANGE UP (ref 7–23)
CALCIUM SERPL-MCNC: 8.9 MG/DL — SIGNIFICANT CHANGE UP (ref 8.4–10.5)
CHLORIDE SERPL-SCNC: 106 MMOL/L — SIGNIFICANT CHANGE UP (ref 98–107)
CO2 SERPL-SCNC: 19 MMOL/L — LOW (ref 22–31)
CREAT SERPL-MCNC: 1.1 MG/DL — SIGNIFICANT CHANGE UP (ref 0.5–1.3)
CRP SERPL-MCNC: 8.5 MG/L — HIGH
ERYTHROCYTE [SEDIMENTATION RATE] IN BLOOD: 18 MM/HR — HIGH (ref 1–15)
GLUCOSE SERPL-MCNC: 97 MG/DL — SIGNIFICANT CHANGE UP (ref 70–99)
HCT VFR BLD CALC: 48.3 % — SIGNIFICANT CHANGE UP (ref 39–50)
HGB BLD-MCNC: 14.4 G/DL — SIGNIFICANT CHANGE UP (ref 13–17)
MAGNESIUM SERPL-MCNC: 2 MG/DL — SIGNIFICANT CHANGE UP (ref 1.6–2.6)
MCHC RBC-ENTMCNC: 28.6 PG — SIGNIFICANT CHANGE UP (ref 27–34)
MCHC RBC-ENTMCNC: 29.8 % — LOW (ref 32–36)
MCV RBC AUTO: 96 FL — SIGNIFICANT CHANGE UP (ref 80–100)
NRBC # FLD: 0 K/UL — SIGNIFICANT CHANGE UP (ref 0–0)
PHOSPHATE SERPL-MCNC: 3.1 MG/DL — SIGNIFICANT CHANGE UP (ref 2.5–4.5)
PLATELET # BLD AUTO: 227 K/UL — SIGNIFICANT CHANGE UP (ref 150–400)
PMV BLD: 10.4 FL — SIGNIFICANT CHANGE UP (ref 7–13)
POTASSIUM SERPL-MCNC: 5 MMOL/L — SIGNIFICANT CHANGE UP (ref 3.5–5.3)
POTASSIUM SERPL-SCNC: 5 MMOL/L — SIGNIFICANT CHANGE UP (ref 3.5–5.3)
PROT SERPL-MCNC: 6.3 G/DL — SIGNIFICANT CHANGE UP (ref 6–8.3)
RBC # BLD: 5.03 M/UL — SIGNIFICANT CHANGE UP (ref 4.2–5.8)
RBC # FLD: 16.8 % — HIGH (ref 10.3–14.5)
SODIUM SERPL-SCNC: 139 MMOL/L — SIGNIFICANT CHANGE UP (ref 135–145)
WBC # BLD: 6.37 K/UL — SIGNIFICANT CHANGE UP (ref 3.8–10.5)
WBC # FLD AUTO: 6.37 K/UL — SIGNIFICANT CHANGE UP (ref 3.8–10.5)

## 2019-08-28 RX ADMIN — NAFCILLIN 200 GRAM(S): 10 INJECTION, POWDER, FOR SOLUTION INTRAVENOUS at 22:15

## 2019-08-28 RX ADMIN — OXYCODONE AND ACETAMINOPHEN 2 TABLET(S): 5; 325 TABLET ORAL at 00:33

## 2019-08-28 RX ADMIN — GABAPENTIN 800 MILLIGRAM(S): 400 CAPSULE ORAL at 22:15

## 2019-08-28 RX ADMIN — GABAPENTIN 800 MILLIGRAM(S): 400 CAPSULE ORAL at 06:08

## 2019-08-28 RX ADMIN — TIZANIDINE 4 MILLIGRAM(S): 4 TABLET ORAL at 22:14

## 2019-08-28 RX ADMIN — NAFCILLIN 200 GRAM(S): 10 INJECTION, POWDER, FOR SOLUTION INTRAVENOUS at 13:55

## 2019-08-28 RX ADMIN — OXYCODONE AND ACETAMINOPHEN 2 TABLET(S): 5; 325 TABLET ORAL at 01:30

## 2019-08-28 RX ADMIN — Medication 25 MILLIGRAM(S): at 06:08

## 2019-08-28 RX ADMIN — Medication 1 PATCH: at 19:00

## 2019-08-28 RX ADMIN — Medication 1 TABLET(S): at 12:06

## 2019-08-28 RX ADMIN — OXYCODONE HYDROCHLORIDE 20 MILLIGRAM(S): 5 TABLET ORAL at 03:10

## 2019-08-28 RX ADMIN — OXYCODONE AND ACETAMINOPHEN 2 TABLET(S): 5; 325 TABLET ORAL at 06:09

## 2019-08-28 RX ADMIN — OXYCODONE HYDROCHLORIDE 20 MILLIGRAM(S): 5 TABLET ORAL at 13:55

## 2019-08-28 RX ADMIN — GABAPENTIN 800 MILLIGRAM(S): 400 CAPSULE ORAL at 13:56

## 2019-08-28 RX ADMIN — Medication 1 PATCH: at 22:16

## 2019-08-28 RX ADMIN — OXYCODONE HYDROCHLORIDE 20 MILLIGRAM(S): 5 TABLET ORAL at 14:25

## 2019-08-28 RX ADMIN — OXYCODONE AND ACETAMINOPHEN 2 TABLET(S): 5; 325 TABLET ORAL at 12:05

## 2019-08-28 RX ADMIN — TIZANIDINE 4 MILLIGRAM(S): 4 TABLET ORAL at 12:09

## 2019-08-28 RX ADMIN — NAFCILLIN 200 GRAM(S): 10 INJECTION, POWDER, FOR SOLUTION INTRAVENOUS at 06:09

## 2019-08-28 RX ADMIN — Medication 25 MILLIGRAM(S): at 18:17

## 2019-08-28 RX ADMIN — LIDOCAINE 1 PATCH: 4 CREAM TOPICAL at 22:15

## 2019-08-28 RX ADMIN — NAFCILLIN 200 GRAM(S): 10 INJECTION, POWDER, FOR SOLUTION INTRAVENOUS at 02:13

## 2019-08-28 RX ADMIN — OXYCODONE HYDROCHLORIDE 20 MILLIGRAM(S): 5 TABLET ORAL at 02:13

## 2019-08-28 RX ADMIN — NORTRIPTYLINE HYDROCHLORIDE 25 MILLIGRAM(S): 10 CAPSULE ORAL at 22:14

## 2019-08-28 RX ADMIN — PANTOPRAZOLE SODIUM 40 MILLIGRAM(S): 20 TABLET, DELAYED RELEASE ORAL at 06:08

## 2019-08-28 RX ADMIN — Medication 20 MILLIGRAM(S): at 06:09

## 2019-08-28 RX ADMIN — Medication 20 MILLIGRAM(S): at 18:18

## 2019-08-28 RX ADMIN — NAFCILLIN 200 GRAM(S): 10 INJECTION, POWDER, FOR SOLUTION INTRAVENOUS at 09:53

## 2019-08-28 RX ADMIN — OXYCODONE AND ACETAMINOPHEN 2 TABLET(S): 5; 325 TABLET ORAL at 07:10

## 2019-08-28 RX ADMIN — Medication 1 PATCH: at 08:24

## 2019-08-28 RX ADMIN — NAFCILLIN 200 GRAM(S): 10 INJECTION, POWDER, FOR SOLUTION INTRAVENOUS at 18:28

## 2019-08-28 RX ADMIN — Medication 6 MILLIGRAM(S): at 22:14

## 2019-08-28 RX ADMIN — OXYCODONE AND ACETAMINOPHEN 2 TABLET(S): 5; 325 TABLET ORAL at 18:35

## 2019-08-28 RX ADMIN — Medication 1 PATCH: at 23:15

## 2019-08-28 RX ADMIN — OXYCODONE AND ACETAMINOPHEN 2 TABLET(S): 5; 325 TABLET ORAL at 12:35

## 2019-08-28 NOTE — PROGRESS NOTE ADULT - ASSESSMENT
55 yo male active heroin use with chronic back pain, kidney cyst a/w acute on chronic back pain with sciatica with difficulty ambulating due to pain 8/4. patient found to have s. aureus bacteremia on cefazolin iv. nephrology consulted for ARNOLDO.     ARNOLDO  peaked 1.79   ARNOLDO likely prerenal on NSAIDs and lisinopril  renal function improved with IVF  off ibuprofen.   renal us done showed left renal cyst  Pt currently on enalapril 20 bid  now off ketorolac iv for pain (8/26/19)   off IVF  renal function stable   monitor bmp, urine output     HTN  controlled  monitor bmp and bp    Hep C  f/u GI  outpatient treatment    Proteinuria  mild  p/c ratio <300, Monitor  hep c +

## 2019-08-28 NOTE — PROGRESS NOTE ADULT - SUBJECTIVE AND OBJECTIVE BOX
LUZ BUCKLEY:[id number],   54yMale followed for:  No Known Allergies    PAST MEDICAL & SURGICAL HISTORY:  Sciatica  S/P ORIF (open reduction internal fixation) fracture    FAMILY HISTORY:    MEDICATIONS  (STANDING):  chlorhexidine 4% Liquid 1 Application(s) Topical <User Schedule>  enalapril 20 milliGRAM(s) Oral every 12 hours  gabapentin 800 milliGRAM(s) Oral three times a day  hydrALAZINE 25 milliGRAM(s) Oral every 12 hours  lidocaine   Patch 1 Patch Transdermal daily  melatonin 6 milliGRAM(s) Oral at bedtime  multivitamin      multivitamin 1 Tablet(s) Oral daily  nafcillin  IVPB      nafcillin  IVPB 2 Gram(s) IV Intermittent every 4 hours  nicotine -  14 mG/24Hr(s) Patch 1 patch Transdermal daily  nortriptyline 25 milliGRAM(s) Oral at bedtime  oxyCODONE  ER Tablet 20 milliGRAM(s) Oral every 12 hours  pantoprazole    Tablet 40 milliGRAM(s) Oral before breakfast    MEDICATIONS  (PRN):  cloNIDine 0.1 milliGRAM(s) Oral two times a day PRN withdrawl symptoms  hydrOXYzine hydrochloride 50 milliGRAM(s) Oral every 8 hours PRN Anxiety  oxyCODONE    5 mG/acetaminophen 325 mG 2 Tablet(s) Oral every 6 hours PRN Severe Pain (7 - 10)  tiZANidine 4 milliGRAM(s) Oral every 6 hours PRN Muscle Spasm      Vital Signs Last 24 Hrs  T(C): 36.9 (28 Aug 2019 06:01), Max: 37.7 (27 Aug 2019 21:12)  T(F): 98.4 (28 Aug 2019 06:01), Max: 99.9 (27 Aug 2019 21:12)  HR: 70 (28 Aug 2019 06:01) (66 - 72)  BP: 144/96 (28 Aug 2019 06:01) (126/83 - 144/96)  BP(mean): --  RR: 16 (28 Aug 2019 06:01) (16 - 20)  SpO2: 100% (28 Aug 2019 06:01) (99% - 100%)  nc/at  s1s2  cta  soft, nt, nd no guarding or rebound  no c/c/e    CBC Full  -  ( 28 Aug 2019 06:00 )  WBC Count : 6.37 K/uL  RBC Count : 5.03 M/uL  Hemoglobin : 14.4 g/dL  Hematocrit : 48.3 %  Platelet Count - Automated : 227 K/uL  Mean Cell Volume : 96.0 fL  Mean Cell Hemoglobin : 28.6 pg  Mean Cell Hemoglobin Concentration : 29.8 %  Auto Neutrophil # : x  Auto Lymphocyte # : x  Auto Monocyte # : x  Auto Eosinophil # : x  Auto Basophil # : x  Auto Neutrophil % : x  Auto Lymphocyte % : x  Auto Monocyte % : x  Auto Eosinophil % : x  Auto Basophil % : x    08-28    139  |  106  |  23  ----------------------------<  97  5.0   |  19<L>  |  1.10    Ca    8.9      28 Aug 2019 06:00  Phos  3.1     08-28  Mg     2.0     08-28    TPro  6.3  /  Alb  2.9<L>  /  TBili  0.6  /  DBili  x   /  AST  25  /  ALT  18  /  AlkPhos  51  08-28

## 2019-08-28 NOTE — PROGRESS NOTE ADULT - SUBJECTIVE AND OBJECTIVE BOX
MARCIOLUZ OBRIEN  54y  Male      Patient is a 54y old  Male who presents with a chief complaint of back pain and sciatica (28 Aug 2019 11:31)  Patient feels better.no sob,no cp, back pain is controlled at present    REVIEW OF SYSTEMS:  neg    INTERVAL HPI/OVERNIGHT EVENTS:  T(C): 36.9 (08-28-19 @ 14:33), Max: 37.7 (08-27-19 @ 21:12)  HR: 71 (08-28-19 @ 14:33) (66 - 72)  BP: 140/94 (08-28-19 @ 14:33) (126/83 - 144/96)  RR: 18 (08-28-19 @ 14:33) (16 - 18)  SpO2: 100% (08-28-19 @ 14:33) (100% - 100%)  Wt(kg): --  I&O's Summary    27 Aug 2019 07:01  -  28 Aug 2019 07:00  --------------------------------------------------------  IN: 300 mL / OUT: 150 mL / NET: 150 mL    28 Aug 2019 07:01  -  28 Aug 2019 20:26  --------------------------------------------------------  IN: 300 mL / OUT: 850 mL / NET: -550 mL      T(C): 36.9 (08-28-19 @ 14:33), Max: 37.7 (08-27-19 @ 21:12)  HR: 71 (08-28-19 @ 14:33) (66 - 72)  BP: 140/94 (08-28-19 @ 14:33) (126/83 - 144/96)  RR: 18 (08-28-19 @ 14:33) (16 - 18)  SpO2: 100% (08-28-19 @ 14:33) (100% - 100%)  Wt(kg): --Vital Signs Last 24 Hrs  T(C): 36.9 (28 Aug 2019 14:33), Max: 37.7 (27 Aug 2019 21:12)  T(F): 98.5 (28 Aug 2019 14:33), Max: 99.9 (27 Aug 2019 21:12)  HR: 71 (28 Aug 2019 14:33) (66 - 72)  BP: 140/94 (28 Aug 2019 14:33) (126/83 - 144/96)  BP(mean): --  RR: 18 (28 Aug 2019 14:33) (16 - 18)  SpO2: 100% (28 Aug 2019 14:33) (100% - 100%)    LABS:                        14.4   6.37  )-----------( 227      ( 28 Aug 2019 06:00 )             48.3     08-28    139  |  106  |  23  ----------------------------<  97  5.0   |  19<L>  |  1.10    Ca    8.9      28 Aug 2019 06:00  Phos  3.1     08-28  Mg     2.0     08-28    TPro  6.3  /  Alb  2.9<L>  /  TBili  0.6  /  DBili  x   /  AST  25  /  ALT  18  /  AlkPhos  51  08-28        CAPILLARY BLOOD GLUCOSE                PAST MEDICAL & SURGICAL HISTORY:  Sciatica  S/P ORIF (open reduction internal fixation) fracture      MEDICATIONS  (STANDING):  enalapril 20 milliGRAM(s) Oral every 12 hours  gabapentin 800 milliGRAM(s) Oral three times a day  hydrALAZINE 25 milliGRAM(s) Oral every 12 hours  lidocaine   Patch 1 Patch Transdermal daily  melatonin 6 milliGRAM(s) Oral at bedtime  multivitamin      multivitamin 1 Tablet(s) Oral daily  nafcillin  IVPB      nafcillin  IVPB 2 Gram(s) IV Intermittent every 4 hours  nicotine -  14 mG/24Hr(s) Patch 1 patch Transdermal daily  nortriptyline 25 milliGRAM(s) Oral at bedtime  oxyCODONE  ER Tablet 20 milliGRAM(s) Oral every 12 hours  pantoprazole    Tablet 40 milliGRAM(s) Oral before breakfast    MEDICATIONS  (PRN):  cloNIDine 0.1 milliGRAM(s) Oral two times a day PRN withdrawl symptoms  hydrOXYzine hydrochloride 50 milliGRAM(s) Oral every 8 hours PRN Anxiety  oxyCODONE    5 mG/acetaminophen 325 mG 2 Tablet(s) Oral every 6 hours PRN Severe Pain (7 - 10)  tiZANidine 4 milliGRAM(s) Oral every 6 hours PRN Muscle Spasm        RADIOLOGY & ADDITIONAL TESTS:    Imaging Personally Reviewed:  [ ] YES  [ ] NO    Consultant(s) Notes Reviewed:  [ ] YES  [ ] NO    PHYSICAL EXAM:  GENERAL: NAD, well-groomed, well-developed  HEAD:  Atraumatic, Normocephalic  EYES: EOMI, PERRLA, conjunctiva and sclera clear  ENMT: No tonsillar erythema, exudates, or enlargement; Moist mucous membranes, Good dentition, No lesions  NECK: Supple, No JVD, Normal thyroid  NERVOUS SYSTEM:  Alert & Oriented X3, Good concentration; Motor Strength 5/5 B/L upper and lower extremities; DTRs 2+ intact and symmetric  CHEST/LUNG: Clear to percussion bilaterally; No rales, rhonchi, wheezing, or rubs  HEART: Regular rate and rhythm; No murmurs, rubs, or gallops  ABDOMEN: Soft, Nontender, Nondistended; Bowel sounds present  EXTREMITIES:  2+ Peripheral Pulses, No clubbing, cyanosis, or edema  LYMPH: No lymphadenopathy noted  SKIN: No rashes or lesions    Care Discussed with Consultants/Other Providers [ x] YES  [ ] NO      Code Status: [] Full Code [] DNR [] DNI [] Goals of Care:   Disposition: [] ICU [] Stroke Unit [] RCU []PCU []Floor [] Discharge Home         JEANINE RodriguezP

## 2019-08-28 NOTE — PROGRESS NOTE ADULT - SUBJECTIVE AND OBJECTIVE BOX
[facility] [place] [facility]   MD JEYSON GALAN DO ANGELA WONG, PA    TEL:  OFFICE: [phone]  DR FORREST CELL: [phone]  DR. OCHOA CELL: [phone]  MARAL CHUN CELL: [phone]        Patient is a 54y old  Male who presents with a chief complaint of back pain and sciatica (28 Aug 2019 09:37)      Patient seen and examined at bedside. No chest pain/sob    VITALS:  T(F): 98.4 (08-28-19 @ 06:01), Max: 99.9 (08-27-19 @ 21:12)  HR: 70 (08-28-19 @ 06:01)  BP: 144/96 (08-28-19 @ 06:01)  RR: 16 (08-28-19 @ 06:01)  SpO2: 100% (08-28-19 @ 06:01)  Wt(kg): --    08-27 @ 07:01  -  08-28 @ 07:00  --------------------------------------------------------  IN: 300 mL / OUT: 150 mL / NET: 150 mL    08-28 @ 07:01  -  08-28 @ 11:31  --------------------------------------------------------  IN: 100 mL / OUT: 400 mL / NET: -300 mL          PHYSICAL EXAM:  Constitutional: NAD  Neck: No JVD  Respiratory: CTAB, no wheezes, rales or rhonchi  Cardiovascular: S1, S2, RRR  Gastrointestinal: BS+, soft, NT/ND  Extremities: No peripheral edema    Hospital Medications:   MEDICATIONS  (STANDING):  chlorhexidine 4% Liquid 1 Application(s) Topical <User Schedule>  enalapril 20 milliGRAM(s) Oral every 12 hours  gabapentin 800 milliGRAM(s) Oral three times a day  hydrALAZINE 25 milliGRAM(s) Oral every 12 hours  lidocaine   Patch 1 Patch Transdermal daily  melatonin 6 milliGRAM(s) Oral at bedtime  multivitamin      multivitamin 1 Tablet(s) Oral daily  nafcillin  IVPB      nafcillin  IVPB 2 Gram(s) IV Intermittent every 4 hours  nicotine -  14 mG/24Hr(s) Patch 1 patch Transdermal daily  nortriptyline 25 milliGRAM(s) Oral at bedtime  oxyCODONE  ER Tablet 20 milliGRAM(s) Oral every 12 hours  pantoprazole    Tablet 40 milliGRAM(s) Oral before breakfast      LABS:  08-28    139  |  106  |  23  ----------------------------<  97  5.0   |  19<L>  |  1.10    Ca    8.9      28 Aug 2019 06:00  Phos  3.1     08-28  Mg     2.0     08-28    TPro  6.3  /  Alb  2.9<L>  /  TBili  0.6  /  DBili      /  AST  25  /  ALT  18  /  AlkPhos  51  08-28    Creatinine Trend: 1.10 <--, 1.11 <--    Phosphorus Level, Serum: 3.1 mg/dL (08-28 @ 06:00)  Albumin, Serum: 2.9 g/dL (08-28 @ 06:00)                              14.4   6.37  )-----------( 227      ( 28 Aug 2019 06:00 )             48.3     Urine Studies:  Urinalysis - [08-12-19 @ 21:20]      Color YELLOW / Appearance CLEAR / SG 1.033 / pH 5.5      Gluc NEGATIVE / Ketone NEGATIVE  / Bili NEGATIVE / Urobili NORMAL       Blood NEGATIVE / Protein 30 / Leuk Est NEGATIVE / Nitrite NEGATIVE      RBC 0-2 / WBC 3-5 / Hyaline NEGATIVE / Gran  / Sq Epi OCC / Non Sq Epi  / Bacteria NEGATIVE        HCV 9.89, Reactive      [08-05-19 @ 05:25]  HIV Nonreactive The HIV Ag/Ab Combo test performed screens for HIV-1 p24  antigen, antibodies to HIV-1 (group M and group O), and  antibodies to HIV-2. All specimens repeatedly reactive  will reflex to an HIV 1/2 antibody confirmation and  differentiation test. This assay detects p24 antigen which  may be present prior to the development of HIV antibodies,  therefore a reactive result with a negative HIV 1/2 AB  Confirmation should be followed up with HIV-1 RNA, HIV-2  RNA and repeat testing in 4-8 weeks. A nonreactive result  does not preclude previous exposure to or infection with  HIV-1 or HIV-2. [facility] prohibits disclosure of this  result to any unauthorized party.      [08-10-19 @ 06:00]      RADIOLOGY & ADDITIONAL STUDIES:

## 2019-08-29 LAB
ANION GAP SERPL CALC-SCNC: 10 MMO/L — SIGNIFICANT CHANGE UP (ref 7–14)
BUN SERPL-MCNC: 20 MG/DL — SIGNIFICANT CHANGE UP (ref 7–23)
CALCIUM SERPL-MCNC: 9.3 MG/DL — SIGNIFICANT CHANGE UP (ref 8.4–10.5)
CHLORIDE SERPL-SCNC: 106 MMOL/L — SIGNIFICANT CHANGE UP (ref 98–107)
CO2 SERPL-SCNC: 24 MMOL/L — SIGNIFICANT CHANGE UP (ref 22–31)
CREAT SERPL-MCNC: 1.02 MG/DL — SIGNIFICANT CHANGE UP (ref 0.5–1.3)
GLUCOSE SERPL-MCNC: 132 MG/DL — HIGH (ref 70–99)
HCT VFR BLD CALC: 41.8 % — SIGNIFICANT CHANGE UP (ref 39–50)
HGB BLD-MCNC: 13 G/DL — SIGNIFICANT CHANGE UP (ref 13–17)
MAGNESIUM SERPL-MCNC: 2 MG/DL — SIGNIFICANT CHANGE UP (ref 1.6–2.6)
MCHC RBC-ENTMCNC: 29.1 PG — SIGNIFICANT CHANGE UP (ref 27–34)
MCHC RBC-ENTMCNC: 31.1 % — LOW (ref 32–36)
MCV RBC AUTO: 93.7 FL — SIGNIFICANT CHANGE UP (ref 80–100)
NRBC # FLD: 0 K/UL — SIGNIFICANT CHANGE UP (ref 0–0)
PHOSPHATE SERPL-MCNC: 3.4 MG/DL — SIGNIFICANT CHANGE UP (ref 2.5–4.5)
PLATELET # BLD AUTO: 239 K/UL — SIGNIFICANT CHANGE UP (ref 150–400)
PMV BLD: 10.1 FL — SIGNIFICANT CHANGE UP (ref 7–13)
POTASSIUM SERPL-MCNC: 4.3 MMOL/L — SIGNIFICANT CHANGE UP (ref 3.5–5.3)
POTASSIUM SERPL-SCNC: 4.3 MMOL/L — SIGNIFICANT CHANGE UP (ref 3.5–5.3)
RBC # BLD: 4.46 M/UL — SIGNIFICANT CHANGE UP (ref 4.2–5.8)
RBC # FLD: 16.5 % — HIGH (ref 10.3–14.5)
SODIUM SERPL-SCNC: 140 MMOL/L — SIGNIFICANT CHANGE UP (ref 135–145)
WBC # BLD: 5.52 K/UL — SIGNIFICANT CHANGE UP (ref 3.8–10.5)
WBC # FLD AUTO: 5.52 K/UL — SIGNIFICANT CHANGE UP (ref 3.8–10.5)

## 2019-08-29 PROCEDURE — 99232 SBSQ HOSP IP/OBS MODERATE 35: CPT

## 2019-08-29 RX ORDER — OXYCODONE HYDROCHLORIDE 5 MG/1
20 TABLET ORAL EVERY 12 HOURS
Refills: 0 | Status: DISCONTINUED | OUTPATIENT
Start: 2019-08-29 | End: 2019-09-05

## 2019-08-29 RX ORDER — OXYCODONE AND ACETAMINOPHEN 5; 325 MG/1; MG/1
2 TABLET ORAL EVERY 6 HOURS
Refills: 0 | Status: DISCONTINUED | OUTPATIENT
Start: 2019-08-29 | End: 2019-09-03

## 2019-08-29 RX ADMIN — Medication 1 PATCH: at 22:43

## 2019-08-29 RX ADMIN — TIZANIDINE 4 MILLIGRAM(S): 4 TABLET ORAL at 15:09

## 2019-08-29 RX ADMIN — OXYCODONE AND ACETAMINOPHEN 2 TABLET(S): 5; 325 TABLET ORAL at 12:07

## 2019-08-29 RX ADMIN — NAFCILLIN 200 GRAM(S): 10 INJECTION, POWDER, FOR SOLUTION INTRAVENOUS at 06:03

## 2019-08-29 RX ADMIN — OXYCODONE HYDROCHLORIDE 20 MILLIGRAM(S): 5 TABLET ORAL at 15:06

## 2019-08-29 RX ADMIN — NAFCILLIN 200 GRAM(S): 10 INJECTION, POWDER, FOR SOLUTION INTRAVENOUS at 01:52

## 2019-08-29 RX ADMIN — PANTOPRAZOLE SODIUM 40 MILLIGRAM(S): 20 TABLET, DELAYED RELEASE ORAL at 06:06

## 2019-08-29 RX ADMIN — OXYCODONE HYDROCHLORIDE 20 MILLIGRAM(S): 5 TABLET ORAL at 01:52

## 2019-08-29 RX ADMIN — NAFCILLIN 200 GRAM(S): 10 INJECTION, POWDER, FOR SOLUTION INTRAVENOUS at 22:43

## 2019-08-29 RX ADMIN — Medication 25 MILLIGRAM(S): at 06:06

## 2019-08-29 RX ADMIN — Medication 6 MILLIGRAM(S): at 23:19

## 2019-08-29 RX ADMIN — Medication 20 MILLIGRAM(S): at 18:02

## 2019-08-29 RX ADMIN — OXYCODONE AND ACETAMINOPHEN 2 TABLET(S): 5; 325 TABLET ORAL at 19:10

## 2019-08-29 RX ADMIN — OXYCODONE AND ACETAMINOPHEN 2 TABLET(S): 5; 325 TABLET ORAL at 06:08

## 2019-08-29 RX ADMIN — NAFCILLIN 200 GRAM(S): 10 INJECTION, POWDER, FOR SOLUTION INTRAVENOUS at 13:57

## 2019-08-29 RX ADMIN — OXYCODONE AND ACETAMINOPHEN 2 TABLET(S): 5; 325 TABLET ORAL at 18:02

## 2019-08-29 RX ADMIN — OXYCODONE HYDROCHLORIDE 20 MILLIGRAM(S): 5 TABLET ORAL at 13:56

## 2019-08-29 RX ADMIN — OXYCODONE HYDROCHLORIDE 20 MILLIGRAM(S): 5 TABLET ORAL at 02:55

## 2019-08-29 RX ADMIN — LIDOCAINE 1 PATCH: 4 CREAM TOPICAL at 10:03

## 2019-08-29 RX ADMIN — OXYCODONE AND ACETAMINOPHEN 2 TABLET(S): 5; 325 TABLET ORAL at 01:15

## 2019-08-29 RX ADMIN — TIZANIDINE 4 MILLIGRAM(S): 4 TABLET ORAL at 06:56

## 2019-08-29 RX ADMIN — NAFCILLIN 200 GRAM(S): 10 INJECTION, POWDER, FOR SOLUTION INTRAVENOUS at 18:02

## 2019-08-29 RX ADMIN — OXYCODONE AND ACETAMINOPHEN 2 TABLET(S): 5; 325 TABLET ORAL at 12:55

## 2019-08-29 RX ADMIN — Medication 20 MILLIGRAM(S): at 06:06

## 2019-08-29 RX ADMIN — OXYCODONE AND ACETAMINOPHEN 2 TABLET(S): 5; 325 TABLET ORAL at 00:15

## 2019-08-29 RX ADMIN — Medication 1 PATCH: at 07:28

## 2019-08-29 RX ADMIN — Medication 25 MILLIGRAM(S): at 18:02

## 2019-08-29 RX ADMIN — GABAPENTIN 800 MILLIGRAM(S): 400 CAPSULE ORAL at 06:06

## 2019-08-29 RX ADMIN — Medication 1 TABLET(S): at 12:07

## 2019-08-29 RX ADMIN — NAFCILLIN 200 GRAM(S): 10 INJECTION, POWDER, FOR SOLUTION INTRAVENOUS at 10:04

## 2019-08-29 RX ADMIN — Medication 1 PATCH: at 22:00

## 2019-08-29 RX ADMIN — TIZANIDINE 4 MILLIGRAM(S): 4 TABLET ORAL at 22:43

## 2019-08-29 RX ADMIN — GABAPENTIN 800 MILLIGRAM(S): 400 CAPSULE ORAL at 22:43

## 2019-08-29 RX ADMIN — GABAPENTIN 800 MILLIGRAM(S): 400 CAPSULE ORAL at 15:08

## 2019-08-29 RX ADMIN — OXYCODONE AND ACETAMINOPHEN 2 TABLET(S): 5; 325 TABLET ORAL at 06:57

## 2019-08-29 RX ADMIN — LIDOCAINE 1 PATCH: 4 CREAM TOPICAL at 07:28

## 2019-08-29 NOTE — PROGRESS NOTE ADULT - SUBJECTIVE AND OBJECTIVE BOX
ULZ BUCKLEY:[id number],   54yMale followed for:  No Known Allergies    PAST MEDICAL & SURGICAL HISTORY:  Sciatica  S/P ORIF (open reduction internal fixation) fracture    FAMILY HISTORY:    MEDICATIONS  (STANDING):  enalapril 20 milliGRAM(s) Oral every 12 hours  gabapentin 800 milliGRAM(s) Oral three times a day  hydrALAZINE 25 milliGRAM(s) Oral every 12 hours  lidocaine   Patch 1 Patch Transdermal daily  melatonin 6 milliGRAM(s) Oral at bedtime  multivitamin      multivitamin 1 Tablet(s) Oral daily  nafcillin  IVPB      nafcillin  IVPB 2 Gram(s) IV Intermittent every 4 hours  nicotine -  14 mG/24Hr(s) Patch 1 patch Transdermal daily  nortriptyline 25 milliGRAM(s) Oral at bedtime  oxyCODONE  ER Tablet 20 milliGRAM(s) Oral every 12 hours  pantoprazole    Tablet 40 milliGRAM(s) Oral before breakfast    MEDICATIONS  (PRN):  cloNIDine 0.1 milliGRAM(s) Oral two times a day PRN withdrawl symptoms  hydrOXYzine hydrochloride 50 milliGRAM(s) Oral every 8 hours PRN Anxiety  oxyCODONE    5 mG/acetaminophen 325 mG 2 Tablet(s) Oral every 6 hours PRN Severe Pain (7 - 10)  tiZANidine 4 milliGRAM(s) Oral every 6 hours PRN Muscle Spasm      Vital Signs Last 24 Hrs  T(C): 36.9 (29 Aug 2019 06:00), Max: 37.1 (28 Aug 2019 21:06)  T(F): 98.5 (29 Aug 2019 06:00), Max: 98.7 (28 Aug 2019 21:06)  HR: 73 (29 Aug 2019 06:00) (71 - 74)  BP: 119/81 (29 Aug 2019 06:00) (119/81 - 140/94)  BP(mean): --  RR: 18 (29 Aug 2019 06:00) (17 - 18)  SpO2: 100% (29 Aug 2019 06:00) (98% - 100%)  nc/at  s1s2  cta  soft, nt, nd no guarding or rebound  no c/c/e    CBC Full  -  ( 29 Aug 2019 06:16 )  WBC Count : 5.52 K/uL  RBC Count : 4.46 M/uL  Hemoglobin : 13.0 g/dL  Hematocrit : 41.8 %  Platelet Count - Automated : 239 K/uL  Mean Cell Volume : 93.7 fL  Mean Cell Hemoglobin : 29.1 pg  Mean Cell Hemoglobin Concentration : 31.1 %  Auto Neutrophil # : x  Auto Lymphocyte # : x  Auto Monocyte # : x  Auto Eosinophil # : x  Auto Basophil # : x  Auto Neutrophil % : x  Auto Lymphocyte % : x  Auto Monocyte % : x  Auto Eosinophil % : x  Auto Basophil % : x    08-29    140  |  106  |  20  ----------------------------<  132<H>  4.3   |  24  |  1.02    Ca    9.3      29 Aug 2019 06:00  Phos  3.4     08-29  Mg     2.0     08-29    TPro  6.3  /  Alb  2.9<L>  /  TBili  0.6  /  DBili  x   /  AST  25  /  ALT  18  /  AlkPhos  51  08-28

## 2019-08-29 NOTE — PROGRESS NOTE ADULT - SUBJECTIVE AND OBJECTIVE BOX
LUZ BUCKLEY  54y  Male      Patient is a 54y old  Male who presents with a chief complaint of back pain and sciatica (29 Aug 2019 15:57)  comfortabl,nad,mild back pain on and off.no cp,no sob,no fever,no cough    REVIEW OF SYSTEMS:  as above    INTERVAL HPI/OVERNIGHT EVENTS:  T(C): 36.8 (08-29-19 @ 18:00), Max: 37.1 (08-28-19 @ 21:06)  HR: 80 (08-29-19 @ 18:00) (73 - 80)  BP: 144/89 (08-29-19 @ 18:00) (119/81 - 146/82)  RR: 16 (08-29-19 @ 18:00) (16 - 18)  SpO2: 100% (08-29-19 @ 18:00) (98% - 100%)  Wt(kg): --  I&O's Summary    28 Aug 2019 07:01  -  29 Aug 2019 07:00  --------------------------------------------------------  IN: 600 mL / OUT: 850 mL / NET: -250 mL    29 Aug 2019 07:01  -  29 Aug 2019 20:41  --------------------------------------------------------  IN: 300 mL / OUT: 400 mL / NET: -100 mL      T(C): 36.8 (08-29-19 @ 18:00), Max: 37.1 (08-28-19 @ 21:06)  HR: 80 (08-29-19 @ 18:00) (73 - 80)  BP: 144/89 (08-29-19 @ 18:00) (119/81 - 146/82)  RR: 16 (08-29-19 @ 18:00) (16 - 18)  SpO2: 100% (08-29-19 @ 18:00) (98% - 100%)  Wt(kg): --Vital Signs Last 24 Hrs  T(C): 36.8 (29 Aug 2019 18:00), Max: 37.1 (28 Aug 2019 21:06)  T(F): 98.3 (29 Aug 2019 18:00), Max: 98.7 (28 Aug 2019 21:06)  HR: 80 (29 Aug 2019 18:00) (73 - 80)  BP: 144/89 (29 Aug 2019 18:00) (119/81 - 146/82)  BP(mean): --  RR: 16 (29 Aug 2019 18:00) (16 - 18)  SpO2: 100% (29 Aug 2019 18:00) (98% - 100%)    LABS:                        13.0   5.52  )-----------( 239      ( 29 Aug 2019 06:16 )             41.8     08-29    140  |  106  |  20  ----------------------------<  132<H>  4.3   |  24  |  1.02    Ca    9.3      29 Aug 2019 06:00  Phos  3.4     08-29  Mg     2.0     08-29    TPro  6.3  /  Alb  2.9<L>  /  TBili  0.6  /  DBili  x   /  AST  25  /  ALT  18  /  AlkPhos  51  08-28        CAPILLARY BLOOD GLUCOSE                PAST MEDICAL & SURGICAL HISTORY:  Sciatica  S/P ORIF (open reduction internal fixation) fracture      MEDICATIONS  (STANDING):  enalapril 20 milliGRAM(s) Oral every 12 hours  gabapentin 800 milliGRAM(s) Oral three times a day  hydrALAZINE 25 milliGRAM(s) Oral every 12 hours  lidocaine   Patch 1 Patch Transdermal daily  melatonin 6 milliGRAM(s) Oral at bedtime  multivitamin      multivitamin 1 Tablet(s) Oral daily  nafcillin  IVPB      nafcillin  IVPB 2 Gram(s) IV Intermittent every 4 hours  nicotine -  14 mG/24Hr(s) Patch 1 patch Transdermal daily  nortriptyline 25 milliGRAM(s) Oral at bedtime  oxyCODONE  ER Tablet 20 milliGRAM(s) Oral every 12 hours  pantoprazole    Tablet 40 milliGRAM(s) Oral before breakfast    MEDICATIONS  (PRN):  cloNIDine 0.1 milliGRAM(s) Oral two times a day PRN withdrawl symptoms  hydrOXYzine hydrochloride 50 milliGRAM(s) Oral every 8 hours PRN Anxiety  oxyCODONE    5 mG/acetaminophen 325 mG 2 Tablet(s) Oral every 6 hours PRN Severe Pain (7 - 10)  tiZANidine 4 milliGRAM(s) Oral every 6 hours PRN Muscle Spasm        RADIOLOGY & ADDITIONAL TESTS:    Imaging Personally Reviewed:  [ ] YES  [ ] NO    Consultant(s) Notes Reviewed:  [x ] YES  [ ] NO    PHYSICAL EXAM:  GENERAL: NAD, well-groomed, well-developed  HEAD:  Atraumatic, Normocephalic  EYES: EOMI, PERRLA, conjunctiva and sclera clear  ENMT: No tonsillar erythema, exudates, or enlargement; Moist mucous membranes, Good dentition, No lesions  NECK: Supple, No JVD, Normal thyroid  NERVOUS SYSTEM:  Alert & Oriented X3, Good concentration; Motor Strength 5/5 B/L upper and lower extremities; DTRs 2+ intact and symmetric  CHEST/LUNG: Clear to percussion bilaterally; No rales, rhonchi, wheezing, or rubs  HEART: Regular rate and rhythm; No murmurs, rubs, or gallops  ABDOMEN: Soft, Nontender, Nondistended; Bowel sounds present  EXTREMITIES:  2+ Peripheral Pulses, No clubbing, cyanosis, or edema  LYMPH: No lymphadenopathy noted  SKIN: No rashes or lesions    Care Discussed with Consultants/Other Providers [x ] YES  [ ] NO      Code Status: [] Full Code [] DNR [] DNI [] Goals of Care:   Disposition: [] ICU [] Stroke Unit [] RCU []PCU []Floor [] Discharge Home         ANTONETTE RodriguezFACP

## 2019-08-29 NOTE — PROGRESS NOTE ADULT - SUBJECTIVE AND OBJECTIVE BOX
[facility]   MD JEYSON GALAN DO ANGELA WONG, PA    TEL:  OFFICE: [phone]  DR FORREST CELL: [phone]  DR. OCHOA CELL: [phone]  MARAL CHUN CELL: [phone]        Patient is a 54y old  Male who presents with a chief complaint of back pain and sciatica (29 Aug 2019 08:20)      Patient seen and examined at bedside. No chest pain/sob    VITALS:  T(F): 98.5 (08-29-19 @ 06:00), Max: 98.7 (08-28-19 @ 21:06)  HR: 73 (08-29-19 @ 06:00)  BP: 119/81 (08-29-19 @ 06:00)  RR: 18 (08-29-19 @ 06:00)  SpO2: 100% (08-29-19 @ 06:00)  Wt(kg): --    08-28 @ 07:01  -  08-29 @ 07:00  --------------------------------------------------------  IN: 600 mL / OUT: 850 mL / NET: -250 mL    08-29 @ 07:01  -  08-29 @ 11:19  --------------------------------------------------------  IN: 100 mL / OUT: 200 mL / NET: -100 mL          PHYSICAL EXAM:  Constitutional: NAD  Neck: No JVD  Respiratory: CTAB, no wheezes, rales or rhonchi  Cardiovascular: S1, S2, RRR  Gastrointestinal: BS+, soft, NT/ND  Extremities: No peripheral edema    Hospital Medications:   MEDICATIONS  (STANDING):  enalapril 20 milliGRAM(s) Oral every 12 hours  gabapentin 800 milliGRAM(s) Oral three times a day  hydrALAZINE 25 milliGRAM(s) Oral every 12 hours  lidocaine   Patch 1 Patch Transdermal daily  melatonin 6 milliGRAM(s) Oral at bedtime  multivitamin      multivitamin 1 Tablet(s) Oral daily  nafcillin  IVPB      nafcillin  IVPB 2 Gram(s) IV Intermittent every 4 hours  nicotine -  14 mG/24Hr(s) Patch 1 patch Transdermal daily  nortriptyline 25 milliGRAM(s) Oral at bedtime  oxyCODONE  ER Tablet 20 milliGRAM(s) Oral every 12 hours  pantoprazole    Tablet 40 milliGRAM(s) Oral before breakfast      LABS:  08-29    140  |  106  |  20  ----------------------------<  132<H>  4.3   |  24  |  1.02    Ca    9.3      29 Aug 2019 06:00  Phos  3.4     08-29  Mg     2.0     08-29    TPro  6.3  /  Alb  2.9<L>  /  TBili  0.6  /  DBili      /  AST  25  /  ALT  18  /  AlkPhos  51  08-28    Creatinine Trend: 1.02 <--, 1.10 <--    Phosphorus Level, Serum: 3.4 mg/dL (08-29 @ 06:00)                              13.0   5.52  )-----------( 239      ( 29 Aug 2019 06:16 )             41.8     Urine Studies:  Urinalysis - [08-12-19 @ 21:20]      Color YELLOW / Appearance CLEAR / SG 1.033 / pH 5.5      Gluc NEGATIVE / Ketone NEGATIVE  / Bili NEGATIVE / Urobili NORMAL       Blood NEGATIVE / Protein 30 / Leuk Est NEGATIVE / Nitrite NEGATIVE      RBC 0-2 / WBC 3-5 / Hyaline NEGATIVE / Gran  / Sq Epi OCC / Non Sq Epi  / Bacteria NEGATIVE        HCV 9.89, Reactive      [08-05-19 @ 05:25]  HIV Nonreactive The HIV Ag/Ab Combo test performed screens for HIV-1 p24  antigen, antibodies to HIV-1 (group M and group O), and  antibodies to HIV-2. All specimens repeatedly reactive  will reflex to an HIV 1/2 antibody confirmation and  differentiation test. This assay detects p24 antigen which  may be present prior to the development of HIV antibodies,  therefore a reactive result with a negative HIV 1/2 AB  Confirmation should be followed up with HIV-1 RNA, HIV-2  RNA and repeat testing in 4-8 weeks. A nonreactive result  does not preclude previous exposure to or infection with  HIV-1 or HIV-2. [facility] prohibits disclosure of this  result to any unauthorized party.      [08-10-19 @ 06:00]      RADIOLOGY & ADDITIONAL STUDIES:

## 2019-08-29 NOTE — PROGRESS NOTE ADULT - ASSESSMENT
54 m with IVDA, hepatitis C (not treated), RLE ORIF and hardware, previous bacteremia and lumbar osteo? 2004, presented with worsening back pain, with difficulty ambulating due to pain.  here afebrile normal WBC  blood cx: 2/2 MSSA  hep C viral load: [id number]  spine MRI: enhancing lesion on L3 s/o occult fx or a lesion but CT negative for FX or lesion  hiv negative    MSSA bacteremia and L3 osteo   active heroine use  repeat cx 8/12 negative   TTE negative  repeat MRI 8/19 with progressed enhancement and edema of L3 with perivertebral soft tissue swelling/enhancement, s/o evolving osteo, no epidural abscess      * c/w Nafcillin 2 q 4  * pt can not be discharged with a line in view of active IVDA  * will have continue the course here or another rehab facility  * will do an 8 week course for L3 osteo until 10/7  * f/u with pain management  * will need weekly CBC, CMP, ESR and CRP

## 2019-08-29 NOTE — PROGRESS NOTE ADULT - SUBJECTIVE AND OBJECTIVE BOX
Follow Up:  MSSA bacteremia    Interval History: pt stable and afebrile, no acute events    ROS:      All other systems negative    Constitutional: no fever, no chills, no weight loss, no night sweats  Eye: no eye pain, no redness, no vision changes  ENT:  no sore throat, no rhinorrhea  Cardiovascular:  no chest pain, no palpitation  Respiratory:  no SOB, no cough  GI:  no abd pain, no vomiting, no diarrhea  urinary: no dysuria, no hematuria, no flank pain  : no penile discharge or bleeding  musculoskeletal:   back pain  skin:  no rash  neurology:  no headache, no seizure, no change in mental status      Allergies  No Known Allergies        ANTIMICROBIALS:  nafcillin  IVPB    nafcillin  IVPB 2 every 4 hours      OTHER MEDS:  cloNIDine 0.1 milliGRAM(s) Oral two times a day PRN  enalapril 20 milliGRAM(s) Oral every 12 hours  gabapentin 800 milliGRAM(s) Oral three times a day  hydrALAZINE 25 milliGRAM(s) Oral every 12 hours  hydrOXYzine hydrochloride 50 milliGRAM(s) Oral every 8 hours PRN  lidocaine   Patch 1 Patch Transdermal daily  melatonin 6 milliGRAM(s) Oral at bedtime  multivitamin      multivitamin 1 Tablet(s) Oral daily  nicotine -  14 mG/24Hr(s) Patch 1 patch Transdermal daily  nortriptyline 25 milliGRAM(s) Oral at bedtime  oxyCODONE    5 mG/acetaminophen 325 mG 2 Tablet(s) Oral every 6 hours PRN  oxyCODONE  ER Tablet 20 milliGRAM(s) Oral every 12 hours  pantoprazole    Tablet 40 milliGRAM(s) Oral before breakfast  tiZANidine 4 milliGRAM(s) Oral every 6 hours PRN      Vital Signs Last 24 Hrs  T(C): 36.9 (29 Aug 2019 13:55), Max: 37.1 (28 Aug 2019 21:06)  T(F): 98.4 (29 Aug 2019 13:55), Max: 98.7 (28 Aug 2019 21:06)  HR: 76 (29 Aug 2019 13:55) (73 - 76)  BP: 146/82 (29 Aug 2019 13:55) (119/81 - 146/82)  BP(mean): --  RR: 16 (29 Aug 2019 13:55) (16 - 18)  SpO2: 99% (29 Aug 2019 13:55) (98% - 100%)    Physical Exam:  General:    NAD, non toxic  Head: atraumatic, normocephalic  Eyes: normal sclera and conjunctiva  ENT:   no oropharyngeal lesions, no LAD, neck supple  Cardio:    regular S1,S2  Respiratory:   clear b/l, no wheezing  abd:   soft, BS +, not tender, no hepatosplenomegaly  :     no CVAT, no suprapubic tenderness, no romero  Musculoskeletal : RLE scar from ORIF but no tenderness  Skin:    no rash  vascular: no phlebitis, normal pulses  Neurologic:     no focal deficits  psych: normal affect                          13.0   5.52  )-----------( 239      ( 29 Aug 2019 06:16 )             41.8       08-29    140  |  106  |  20  ----------------------------<  132<H>  4.3   |  24  |  1.02    Ca    9.3      29 Aug 2019 06:00  Phos  3.4     08-29  Mg     2.0     08-29    TPro  6.3  /  Alb  2.9<L>  /  TBili  0.6  /  DBili  x   /  AST  25  /  ALT  18  /  AlkPhos  51  08-28          MICROBIOLOGY:  v  BLOOD PERIPHERAL  08-12-19 --  --  --      BLOOD VENOUS  08-10-19 --  --  --      BLOOD  08-08-19 --  --  --      BLOOD  08-07-19 --  --  Staphylococcus aureus  BLOOD CULTURE PCR                RADIOLOGY:  Images below reviewed personally  < from: MR Lumbar Spine w/wo IV Cont (08.19.19 @ 11:13) >    IMPRESSION: Redemonstrated abnormal signal involving the L3 vertebral   body with associated enhancement and edema which has gradually progressed   from the prior exams. There is a small amount of prevertebral soft tissue   swelling/enhancement. Findings favor evolving osteomyelitis.    No evidence of epidural abscess.

## 2019-08-30 PROCEDURE — 99232 SBSQ HOSP IP/OBS MODERATE 35: CPT

## 2019-08-30 RX ORDER — NYSTATIN CREAM 100000 [USP'U]/G
1 CREAM TOPICAL
Refills: 0 | Status: DISCONTINUED | OUTPATIENT
Start: 2019-08-30 | End: 2019-09-06

## 2019-08-30 RX ADMIN — OXYCODONE AND ACETAMINOPHEN 2 TABLET(S): 5; 325 TABLET ORAL at 12:29

## 2019-08-30 RX ADMIN — Medication 1 PATCH: at 22:59

## 2019-08-30 RX ADMIN — OXYCODONE HYDROCHLORIDE 20 MILLIGRAM(S): 5 TABLET ORAL at 15:08

## 2019-08-30 RX ADMIN — OXYCODONE AND ACETAMINOPHEN 2 TABLET(S): 5; 325 TABLET ORAL at 00:17

## 2019-08-30 RX ADMIN — NAFCILLIN 200 GRAM(S): 10 INJECTION, POWDER, FOR SOLUTION INTRAVENOUS at 15:08

## 2019-08-30 RX ADMIN — OXYCODONE AND ACETAMINOPHEN 2 TABLET(S): 5; 325 TABLET ORAL at 00:53

## 2019-08-30 RX ADMIN — GABAPENTIN 800 MILLIGRAM(S): 400 CAPSULE ORAL at 06:53

## 2019-08-30 RX ADMIN — OXYCODONE HYDROCHLORIDE 20 MILLIGRAM(S): 5 TABLET ORAL at 03:00

## 2019-08-30 RX ADMIN — OXYCODONE AND ACETAMINOPHEN 2 TABLET(S): 5; 325 TABLET ORAL at 23:45

## 2019-08-30 RX ADMIN — TIZANIDINE 4 MILLIGRAM(S): 4 TABLET ORAL at 06:53

## 2019-08-30 RX ADMIN — GABAPENTIN 800 MILLIGRAM(S): 400 CAPSULE ORAL at 22:57

## 2019-08-30 RX ADMIN — NAFCILLIN 200 GRAM(S): 10 INJECTION, POWDER, FOR SOLUTION INTRAVENOUS at 18:23

## 2019-08-30 RX ADMIN — Medication 25 MILLIGRAM(S): at 18:22

## 2019-08-30 RX ADMIN — OXYCODONE AND ACETAMINOPHEN 2 TABLET(S): 5; 325 TABLET ORAL at 07:17

## 2019-08-30 RX ADMIN — OXYCODONE AND ACETAMINOPHEN 2 TABLET(S): 5; 325 TABLET ORAL at 18:23

## 2019-08-30 RX ADMIN — OXYCODONE AND ACETAMINOPHEN 2 TABLET(S): 5; 325 TABLET ORAL at 23:12

## 2019-08-30 RX ADMIN — OXYCODONE AND ACETAMINOPHEN 2 TABLET(S): 5; 325 TABLET ORAL at 13:11

## 2019-08-30 RX ADMIN — NAFCILLIN 200 GRAM(S): 10 INJECTION, POWDER, FOR SOLUTION INTRAVENOUS at 02:01

## 2019-08-30 RX ADMIN — Medication 20 MILLIGRAM(S): at 06:25

## 2019-08-30 RX ADMIN — Medication 20 MILLIGRAM(S): at 18:22

## 2019-08-30 RX ADMIN — Medication 1 PATCH: at 07:17

## 2019-08-30 RX ADMIN — NAFCILLIN 200 GRAM(S): 10 INJECTION, POWDER, FOR SOLUTION INTRAVENOUS at 06:56

## 2019-08-30 RX ADMIN — OXYCODONE HYDROCHLORIDE 20 MILLIGRAM(S): 5 TABLET ORAL at 02:02

## 2019-08-30 RX ADMIN — OXYCODONE HYDROCHLORIDE 20 MILLIGRAM(S): 5 TABLET ORAL at 15:10

## 2019-08-30 RX ADMIN — NAFCILLIN 200 GRAM(S): 10 INJECTION, POWDER, FOR SOLUTION INTRAVENOUS at 22:58

## 2019-08-30 RX ADMIN — NAFCILLIN 200 GRAM(S): 10 INJECTION, POWDER, FOR SOLUTION INTRAVENOUS at 11:41

## 2019-08-30 RX ADMIN — TIZANIDINE 4 MILLIGRAM(S): 4 TABLET ORAL at 22:58

## 2019-08-30 RX ADMIN — Medication 1 TABLET(S): at 11:41

## 2019-08-30 RX ADMIN — OXYCODONE AND ACETAMINOPHEN 2 TABLET(S): 5; 325 TABLET ORAL at 06:23

## 2019-08-30 RX ADMIN — Medication 1 PATCH: at 19:30

## 2019-08-30 RX ADMIN — NYSTATIN CREAM 1 APPLICATION(S): 100000 CREAM TOPICAL at 18:23

## 2019-08-30 NOTE — PROGRESS NOTE ADULT - SUBJECTIVE AND OBJECTIVE BOX
LUZ BUCKLEY:[id number],   54yMale followed for:  No Known Allergies    PAST MEDICAL & SURGICAL HISTORY:  Sciatica  S/P ORIF (open reduction internal fixation) fracture    FAMILY HISTORY:    MEDICATIONS  (STANDING):  enalapril 20 milliGRAM(s) Oral every 12 hours  gabapentin 800 milliGRAM(s) Oral three times a day  hydrALAZINE 25 milliGRAM(s) Oral every 12 hours  lidocaine   Patch 1 Patch Transdermal daily  melatonin 6 milliGRAM(s) Oral at bedtime  multivitamin      multivitamin 1 Tablet(s) Oral daily  nafcillin  IVPB      nafcillin  IVPB 2 Gram(s) IV Intermittent every 4 hours  nicotine -  14 mG/24Hr(s) Patch 1 patch Transdermal daily  nortriptyline 25 milliGRAM(s) Oral at bedtime  oxyCODONE  ER Tablet 20 milliGRAM(s) Oral every 12 hours  pantoprazole    Tablet 40 milliGRAM(s) Oral before breakfast    MEDICATIONS  (PRN):  cloNIDine 0.1 milliGRAM(s) Oral two times a day PRN withdrawl symptoms  hydrOXYzine hydrochloride 50 milliGRAM(s) Oral every 8 hours PRN Anxiety  oxyCODONE    5 mG/acetaminophen 325 mG 2 Tablet(s) Oral every 6 hours PRN Severe Pain (7 - 10)  tiZANidine 4 milliGRAM(s) Oral every 6 hours PRN Muscle Spasm      Vital Signs Last 24 Hrs  T(C): 36.7 (30 Aug 2019 06:30), Max: 36.9 (29 Aug 2019 13:55)  T(F): 98 (30 Aug 2019 06:30), Max: 98.4 (29 Aug 2019 13:55)  HR: 72 (30 Aug 2019 06:30) (72 - 80)  BP: 152/91 (30 Aug 2019 06:30) (144/89 - 158/109)  BP(mean): --  RR: 16 (30 Aug 2019 06:30) (16 - 18)  SpO2: 100% (30 Aug 2019 06:30) (99% - 100%)  nc/at  s1s2  cta  soft, nt, nd no guarding or rebound  no c/c/e    CBC Full  -  ( 29 Aug 2019 06:16 )  WBC Count : 5.52 K/uL  RBC Count : 4.46 M/uL  Hemoglobin : 13.0 g/dL  Hematocrit : 41.8 %  Platelet Count - Automated : 239 K/uL  Mean Cell Volume : 93.7 fL  Mean Cell Hemoglobin : 29.1 pg  Mean Cell Hemoglobin Concentration : 31.1 %  Auto Neutrophil # : x  Auto Lymphocyte # : x  Auto Monocyte # : x  Auto Eosinophil # : x  Auto Basophil # : x  Auto Neutrophil % : x  Auto Lymphocyte % : x  Auto Monocyte % : x  Auto Eosinophil % : x  Auto Basophil % : x    08-29    140  |  106  |  20  ----------------------------<  132<H>  4.3   |  24  |  1.02    Ca    9.3      29 Aug 2019 06:00  Phos  3.4     08-29  Mg     2.0     08-29

## 2019-08-30 NOTE — PROGRESS NOTE ADULT - SUBJECTIVE AND OBJECTIVE BOX
Follow Up:  MSSA bacteremia    Interval History: pt stable and afebrile, no acute events    ROS:      All other systems negative    Constitutional: no fever, no chills, no weight loss, no night sweats  Eye: no eye pain, no redness, no vision changes  ENT:  no sore throat, no rhinorrhea  Cardiovascular:  no chest pain, no palpitation  Respiratory:  no SOB, no cough  GI:  no abd pain, no vomiting, no diarrhea  urinary: no dysuria, no hematuria, no flank pain  : no penile discharge or bleeding  musculoskeletal:   back pain  skin:  no rash  neurology:  no headache, no seizure, no change in mental status        Allergies  No Known Allergies        ANTIMICROBIALS:  nafcillin  IVPB    nafcillin  IVPB 2 every 4 hours      OTHER MEDS:  cloNIDine 0.1 milliGRAM(s) Oral two times a day PRN  enalapril 20 milliGRAM(s) Oral every 12 hours  gabapentin 800 milliGRAM(s) Oral three times a day  hydrALAZINE 25 milliGRAM(s) Oral every 12 hours  hydrOXYzine hydrochloride 50 milliGRAM(s) Oral every 8 hours PRN  lidocaine   Patch 1 Patch Transdermal daily  melatonin 6 milliGRAM(s) Oral at bedtime  multivitamin      multivitamin 1 Tablet(s) Oral daily  nicotine -  14 mG/24Hr(s) Patch 1 patch Transdermal daily  nortriptyline 25 milliGRAM(s) Oral at bedtime  oxyCODONE    5 mG/acetaminophen 325 mG 2 Tablet(s) Oral every 6 hours PRN  oxyCODONE  ER Tablet 20 milliGRAM(s) Oral every 12 hours  pantoprazole    Tablet 40 milliGRAM(s) Oral before breakfast  tiZANidine 4 milliGRAM(s) Oral every 6 hours PRN      Vital Signs Last 24 Hrs  T(C): 36.8 (30 Aug 2019 12:42), Max: 36.8 (29 Aug 2019 18:00)  T(F): 98.3 (30 Aug 2019 12:42), Max: 98.3 (29 Aug 2019 18:00)  HR: 77 (30 Aug 2019 12:42) (72 - 80)  BP: 160/112 (30 Aug 2019 12:42) (144/89 - 160/112)  BP(mean): --  RR: 16 (30 Aug 2019 12:42) (16 - 18)  SpO2: 100% (30 Aug 2019 12:42) (99% - 100%)    Physical Exam:  General:    NAD, non toxic  Head: atraumatic, normocephalic  Eyes: normal sclera and conjunctiva  ENT:   no oropharyngeal lesions, no LAD, neck supple  Cardio:    regular S1,S2  Respiratory:   clear b/l, no wheezing  abd:   soft, BS +, not tender, no hepatosplenomegaly  :     no CVAT, no suprapubic tenderness, no romero  Musculoskeletal : RLE scar from ORIF but no tenderness  Skin:    no rash  vascular: no phlebitis, normal pulses  Neurologic:     no focal deficits  psych: normal affect                            13.0   5.52  )-----------( 239      ( 29 Aug 2019 06:16 )             41.8       08-29    140  |  106  |  20  ----------------------------<  132<H>  4.3   |  24  |  1.02    Ca    9.3      29 Aug 2019 06:00  Phos  3.4     08-29  Mg     2.0     08-29            MICROBIOLOGY:  v  BLOOD PERIPHERAL  08-12-19 --  --  --      BLOOD VENOUS  08-10-19 --  --  --      BLOOD  08-08-19 --  --  --      BLOOD  08-07-19 --  --  Staphylococcus aureus  BLOOD CULTURE PCR                RADIOLOGY:  Images below reviewed personally  < from: MR Lumbar Spine w/wo IV Cont (08.19.19 @ 11:13) >  IMPRESSION: Redemonstrated abnormal signal involving the L3 vertebral   body with associated enhancement and edema which has gradually progressed   from the prior exams. There is a small amount of prevertebral soft tissue   swelling/enhancement. Findings favor evolving osteomyelitis.    No evidence of epidural abscess.

## 2019-08-30 NOTE — PROGRESS NOTE ADULT - ASSESSMENT
55 yo male active heroin use with chronic back pain, kidney cyst a/w acute on chronic back pain with sciatica with difficulty ambulating due to pain 8/4. patient found to have s. aureus bacteremia on cefazolin iv. nephrology consulted for ARNOLDO.     ARNOLDO  peaked 1.79   ARNOLDO likely prerenal on NSAIDs and lisinopril  renal function improved with IVF  off ibuprofen.   renal us done showed left renal cyst  Pt currently on enalapril 20 bid  now off ketorolac iv for pain (8/26/19)   off IVF  renal function stable   monitor bmp, urine output     HTN  uncontrolled  increase hydralazine 25 tid  monitor bmp and bp    Hep C  f/u GI  outpatient treatment    Proteinuria  mild  p/c ratio <300, Monitor  hep c +

## 2019-08-30 NOTE — PROGRESS NOTE ADULT - SUBJECTIVE AND OBJECTIVE BOX
[facility]   MD JEYSON GALAN DO ANGELA WONG, PA    TEL:  OFFICE: [phone]  DR FORREST CELL: [phone]  DR. OCHOA CELL: [phone]  MARAL CHUN CELL: [phone]        Patient is a 54y old  Male who presents with a chief complaint of back pain and sciatica (30 Aug 2019 10:18)      Patient seen and examined at bedside. No chest pain/sob    VITALS:  T(F): 98.3 (08-30-19 @ 12:42), Max: 98.3 (08-29-19 @ 18:00)  HR: 77 (08-30-19 @ 12:42)  BP: 160/112 (08-30-19 @ 12:42)  RR: 16 (08-30-19 @ 12:42)  SpO2: 100% (08-30-19 @ 12:42)  Wt(kg): --    08-29 @ 07:01  -  08-30 @ 07:00  --------------------------------------------------------  IN: 600 mL / OUT: 400 mL / NET: 200 mL          PHYSICAL EXAM:  Constitutional: NAD  Neck: No JVD  Respiratory: CTAB, no wheezes, rales or rhonchi  Cardiovascular: S1, S2, RRR  Gastrointestinal: BS+, soft, NT/ND  Extremities: No peripheral edema    Hospital Medications:   MEDICATIONS  (STANDING):  enalapril 20 milliGRAM(s) Oral every 12 hours  gabapentin 800 milliGRAM(s) Oral three times a day  hydrALAZINE 25 milliGRAM(s) Oral every 12 hours  lidocaine   Patch 1 Patch Transdermal daily  melatonin 6 milliGRAM(s) Oral at bedtime  multivitamin      multivitamin 1 Tablet(s) Oral daily  nafcillin  IVPB      nafcillin  IVPB 2 Gram(s) IV Intermittent every 4 hours  nicotine -  14 mG/24Hr(s) Patch 1 patch Transdermal daily  nortriptyline 25 milliGRAM(s) Oral at bedtime  oxyCODONE  ER Tablet 20 milliGRAM(s) Oral every 12 hours  pantoprazole    Tablet 40 milliGRAM(s) Oral before breakfast      LABS:  08-29    140  |  106  |  20  ----------------------------<  132<H>  4.3   |  24  |  1.02    Ca    9.3      29 Aug 2019 06:00  Phos  3.4     08-29  Mg     2.0     08-29      Creatinine Trend: 1.02 <--, 1.10 <--                                13.0   5.52  )-----------( 239      ( 29 Aug 2019 06:16 )             41.8     Urine Studies:  Urinalysis - [08-12-19 @ 21:20]      Color YELLOW / Appearance CLEAR / SG 1.033 / pH 5.5      Gluc NEGATIVE / Ketone NEGATIVE  / Bili NEGATIVE / Urobili NORMAL       Blood NEGATIVE / Protein 30 / Leuk Est NEGATIVE / Nitrite NEGATIVE      RBC 0-2 / WBC 3-5 / Hyaline NEGATIVE / Gran  / Sq Epi OCC / Non Sq Epi  / Bacteria NEGATIVE        HCV 9.89, Reactive      [08-05-19 @ 05:25]  HIV Nonreactive The HIV Ag/Ab Combo test performed screens for HIV-1 p24  antigen, antibodies to HIV-1 (group M and group O), and  antibodies to HIV-2. All specimens repeatedly reactive  will reflex to an HIV 1/2 antibody confirmation and  differentiation test. This assay detects p24 antigen which  may be present prior to the development of HIV antibodies,  therefore a reactive result with a negative HIV 1/2 AB  Confirmation should be followed up with HIV-1 RNA, HIV-2  RNA and repeat testing in 4-8 weeks. A nonreactive result  does not preclude previous exposure to or infection with  HIV-1 or HIV-2. [facility] prohibits disclosure of this  result to any unauthorized party.      [08-10-19 @ 06:00]      RADIOLOGY & ADDITIONAL STUDIES:

## 2019-08-30 NOTE — PROGRESS NOTE ADULT - SUBJECTIVE AND OBJECTIVE BOX
MARCIO LUZ  54y  Male      Patient is a 54y old  Male who presents with a chief complaint of back pain and sciatica (30 Aug 2019 16:31)  c/o rash,itching perineal area.Back pain is better,no sob,no cp,no fever    REVIEW OF SYSTEMS:  as above  INTERVAL HPI/OVERNIGHT EVENTS:  T(C): 36.8 (08-30-19 @ 12:42), Max: 36.8 (08-29-19 @ 21:50)  HR: 77 (08-30-19 @ 12:42) (72 - 79)  BP: 160/112 (08-30-19 @ 12:42) (152/91 - 160/112)  RR: 16 (08-30-19 @ 12:42) (16 - 18)  SpO2: 100% (08-30-19 @ 12:42) (99% - 100%)  Wt(kg): --  I&O's Summary    29 Aug 2019 07:01  -  30 Aug 2019 07:00  --------------------------------------------------------  IN: 600 mL / OUT: 400 mL / NET: 200 mL      T(C): 36.8 (08-30-19 @ 12:42), Max: 36.8 (08-29-19 @ 21:50)  HR: 77 (08-30-19 @ 12:42) (72 - 79)  BP: 160/112 (08-30-19 @ 12:42) (152/91 - 160/112)  RR: 16 (08-30-19 @ 12:42) (16 - 18)  SpO2: 100% (08-30-19 @ 12:42) (99% - 100%)  Wt(kg): --Vital Signs Last 24 Hrs  T(C): 36.8 (30 Aug 2019 12:42), Max: 36.8 (29 Aug 2019 21:50)  T(F): 98.3 (30 Aug 2019 12:42), Max: 98.3 (29 Aug 2019 21:50)  HR: 77 (30 Aug 2019 12:42) (72 - 79)  BP: 160/112 (30 Aug 2019 12:42) (152/91 - 160/112)  BP(mean): --  RR: 16 (30 Aug 2019 12:42) (16 - 18)  SpO2: 100% (30 Aug 2019 12:42) (99% - 100%)    LABS:                        13.0   5.52  )-----------( 239      ( 29 Aug 2019 06:16 )             41.8     08-29    140  |  106  |  20  ----------------------------<  132<H>  4.3   |  24  |  1.02    Ca    9.3      29 Aug 2019 06:00  Phos  3.4     08-29  Mg     2.0     08-29          CAPILLARY BLOOD GLUCOSE                PAST MEDICAL & SURGICAL HISTORY:  Sciatica  S/P ORIF (open reduction internal fixation) fracture      MEDICATIONS  (STANDING):  enalapril 20 milliGRAM(s) Oral every 12 hours  gabapentin 800 milliGRAM(s) Oral three times a day  hydrALAZINE 25 milliGRAM(s) Oral every 12 hours  lidocaine   Patch 1 Patch Transdermal daily  melatonin 6 milliGRAM(s) Oral at bedtime  multivitamin      multivitamin 1 Tablet(s) Oral daily  nafcillin  IVPB      nafcillin  IVPB 2 Gram(s) IV Intermittent every 4 hours  nicotine -  14 mG/24Hr(s) Patch 1 patch Transdermal daily  nortriptyline 25 milliGRAM(s) Oral at bedtime  nystatin Powder 1 Application(s) Topical two times a day  oxyCODONE  ER Tablet 20 milliGRAM(s) Oral every 12 hours  pantoprazole    Tablet 40 milliGRAM(s) Oral before breakfast    MEDICATIONS  (PRN):  cloNIDine 0.1 milliGRAM(s) Oral two times a day PRN withdrawl symptoms  hydrOXYzine hydrochloride 50 milliGRAM(s) Oral every 8 hours PRN Anxiety  oxyCODONE    5 mG/acetaminophen 325 mG 2 Tablet(s) Oral every 6 hours PRN Severe Pain (7 - 10)  tiZANidine 4 milliGRAM(s) Oral every 6 hours PRN Muscle Spasm        RADIOLOGY & ADDITIONAL TESTS:    Imaging Personally Reviewed:  [ ] YES  [ ] NO    Consultant(s) Notes Reviewed:  [ ] YES  [ ] NO    PHYSICAL EXAM:  GENERAL: NAD, well-groomed, well-developed  HEAD:  Atraumatic, Normocephalic  EYES: EOMI, PERRLA, conjunctiva and sclera clear  ENMT: No tonsillar erythema, exudates, or enlargement; Moist mucous membranes, Good dentition, No lesions  NECK: Supple, No JVD, Normal thyroid  NERVOUS SYSTEM:  Alert & Oriented X3, Good concentration; Motor Strength 5/5 B/L upper and lower extremities; DTRs 2+ intact and symmetric  CHEST/LUNG: Clear to percussion bilaterally; No rales, rhonchi, wheezing, or rubs  HEART: Regular rate and rhythm; No murmurs, rubs, or gallops  ABDOMEN: Soft, Nontender, Nondistended; Bowel sounds present  EXTREMITIES:  2+ Peripheral Pulses, No clubbing, cyanosis, or edema  LYMPH: No lymphadenopathy noted  SKIN: No rashes or lesions    Care Discussed with Consultants/Other Providers [x ] YES  [ ] NO      Code Status: [] Full Code [] DNR [] DNI [] Goals of Care:   Disposition: [] ICU [] Stroke Unit [] RCU []PCU []Floor [] Discharge Home         JEANINE RodriguezP

## 2019-08-30 NOTE — PROGRESS NOTE ADULT - ASSESSMENT
54 m with IVDA, hepatitis C (not treated), RLE ORIF and hardware, previous bacteremia and lumbar osteo? 2004, presented with worsening back pain, with difficulty ambulating due to pain.  here afebrile normal WBC  blood cx: 2/2 MSSA  hep C viral load: [id number]  spine MRI: enhancing lesion on L3 s/o occult fx or a lesion but CT negative for FX or lesion  hiv negative    MSSA bacteremia and L3 osteo   active heroine use  repeat cx 8/12 negative   TTE negative  repeat MRI 8/19 with progressed enhancement and edema of L3 with perivertebral soft tissue swelling/enhancement, s/o evolving osteo, no epidural abscess      * c/w Nafcillin 2 q 4  * pt can not be discharged with a line in view of active IVDA  * will have continue the course here or another rehab facility  * will do an 8 week course for L3 osteo until 10/7  * f/u with pain management  * will need weekly CBC, CMP, ESR and CRP

## 2019-08-31 RX ORDER — HYDRALAZINE HCL 50 MG
25 TABLET ORAL THREE TIMES A DAY
Refills: 0 | Status: DISCONTINUED | OUTPATIENT
Start: 2019-08-31 | End: 2019-09-06

## 2019-08-31 RX ORDER — HYDRALAZINE HCL 50 MG
25 TABLET ORAL THREE TIMES A DAY
Refills: 0 | Status: DISCONTINUED | OUTPATIENT
Start: 2019-08-31 | End: 2019-08-31

## 2019-08-31 RX ADMIN — LIDOCAINE 1 PATCH: 4 CREAM TOPICAL at 06:27

## 2019-08-31 RX ADMIN — OXYCODONE AND ACETAMINOPHEN 2 TABLET(S): 5; 325 TABLET ORAL at 14:16

## 2019-08-31 RX ADMIN — OXYCODONE HYDROCHLORIDE 20 MILLIGRAM(S): 5 TABLET ORAL at 14:41

## 2019-08-31 RX ADMIN — Medication 1 PATCH: at 21:08

## 2019-08-31 RX ADMIN — NAFCILLIN 200 GRAM(S): 10 INJECTION, POWDER, FOR SOLUTION INTRAVENOUS at 06:55

## 2019-08-31 RX ADMIN — GABAPENTIN 800 MILLIGRAM(S): 400 CAPSULE ORAL at 21:09

## 2019-08-31 RX ADMIN — OXYCODONE AND ACETAMINOPHEN 2 TABLET(S): 5; 325 TABLET ORAL at 12:42

## 2019-08-31 RX ADMIN — Medication 20 MILLIGRAM(S): at 06:55

## 2019-08-31 RX ADMIN — NAFCILLIN 200 GRAM(S): 10 INJECTION, POWDER, FOR SOLUTION INTRAVENOUS at 12:37

## 2019-08-31 RX ADMIN — OXYCODONE HYDROCHLORIDE 20 MILLIGRAM(S): 5 TABLET ORAL at 02:31

## 2019-08-31 RX ADMIN — NAFCILLIN 200 GRAM(S): 10 INJECTION, POWDER, FOR SOLUTION INTRAVENOUS at 21:09

## 2019-08-31 RX ADMIN — NAFCILLIN 200 GRAM(S): 10 INJECTION, POWDER, FOR SOLUTION INTRAVENOUS at 03:09

## 2019-08-31 RX ADMIN — OXYCODONE AND ACETAMINOPHEN 2 TABLET(S): 5; 325 TABLET ORAL at 18:39

## 2019-08-31 RX ADMIN — Medication 1 TABLET(S): at 12:37

## 2019-08-31 RX ADMIN — Medication 1 PATCH: at 20:34

## 2019-08-31 RX ADMIN — NAFCILLIN 200 GRAM(S): 10 INJECTION, POWDER, FOR SOLUTION INTRAVENOUS at 17:48

## 2019-08-31 RX ADMIN — OXYCODONE HYDROCHLORIDE 20 MILLIGRAM(S): 5 TABLET ORAL at 17:36

## 2019-08-31 RX ADMIN — LIDOCAINE 1 PATCH: 4 CREAM TOPICAL at 07:25

## 2019-08-31 RX ADMIN — OXYCODONE AND ACETAMINOPHEN 2 TABLET(S): 5; 325 TABLET ORAL at 06:23

## 2019-08-31 RX ADMIN — GABAPENTIN 800 MILLIGRAM(S): 400 CAPSULE ORAL at 06:55

## 2019-08-31 RX ADMIN — Medication 1 PATCH: at 07:25

## 2019-08-31 RX ADMIN — TIZANIDINE 4 MILLIGRAM(S): 4 TABLET ORAL at 07:50

## 2019-08-31 RX ADMIN — LIDOCAINE 1 PATCH: 4 CREAM TOPICAL at 21:07

## 2019-08-31 RX ADMIN — Medication 6 MILLIGRAM(S): at 21:09

## 2019-08-31 RX ADMIN — TIZANIDINE 4 MILLIGRAM(S): 4 TABLET ORAL at 17:48

## 2019-08-31 RX ADMIN — Medication 1 PATCH: at 00:35

## 2019-08-31 RX ADMIN — Medication 20 MILLIGRAM(S): at 17:48

## 2019-08-31 RX ADMIN — GABAPENTIN 800 MILLIGRAM(S): 400 CAPSULE ORAL at 14:41

## 2019-08-31 RX ADMIN — Medication 6 MILLIGRAM(S): at 00:35

## 2019-08-31 RX ADMIN — NYSTATIN CREAM 1 APPLICATION(S): 100000 CREAM TOPICAL at 06:26

## 2019-08-31 RX ADMIN — OXYCODONE HYDROCHLORIDE 20 MILLIGRAM(S): 5 TABLET ORAL at 03:05

## 2019-08-31 NOTE — PROGRESS NOTE ADULT - ASSESSMENT
55 yo male active heroin use with chronic back pain, kidney cyst a/w acute on chronic back pain with sciatica with difficulty ambulating due to pain 8/4. patient found to have s. aureus bacteremia on cefazolin iv. nephrology consulted for ARNOLDO.     ARNOLDO  peaked 1.79   ARNOLDO likely prerenal on NSAIDs and lisinopril  renal function improved with IVF  off ibuprofen.   renal us done showed left renal cyst  Pt currently on enalapril 20 bid  now off ketorolac iv for pain (8/26/19)   off IVF  renal function stable (no new lab)  monitor bmp, urine output     HTN  uncontrolled  increase hydralazine 25 tid  monitor bmp and bp    Hep C  f/u GI  outpatient treatment    Proteinuria  mild  p/c ratio <300, Monitor  hep c +

## 2019-08-31 NOTE — PROGRESS NOTE ADULT - SUBJECTIVE AND OBJECTIVE BOX
LUZ BUCKLEY:[id number],   54yMale followed for:  No Known Allergies    PAST MEDICAL & SURGICAL HISTORY:  Sciatica  S/P ORIF (open reduction internal fixation) fracture    FAMILY HISTORY:    MEDICATIONS  (STANDING):  enalapril 20 milliGRAM(s) Oral every 12 hours  gabapentin 800 milliGRAM(s) Oral three times a day  hydrALAZINE 25 milliGRAM(s) Oral three times a day  lidocaine   Patch 1 Patch Transdermal daily  melatonin 6 milliGRAM(s) Oral at bedtime  multivitamin      multivitamin 1 Tablet(s) Oral daily  nafcillin  IVPB      nafcillin  IVPB 2 Gram(s) IV Intermittent every 4 hours  nicotine -  14 mG/24Hr(s) Patch 1 patch Transdermal daily  nortriptyline 25 milliGRAM(s) Oral at bedtime  nystatin Powder 1 Application(s) Topical two times a day  oxyCODONE  ER Tablet 20 milliGRAM(s) Oral every 12 hours  pantoprazole    Tablet 40 milliGRAM(s) Oral before breakfast    MEDICATIONS  (PRN):  cloNIDine 0.1 milliGRAM(s) Oral two times a day PRN withdrawl symptoms  hydrOXYzine hydrochloride 50 milliGRAM(s) Oral every 8 hours PRN Anxiety  oxyCODONE    5 mG/acetaminophen 325 mG 2 Tablet(s) Oral every 6 hours PRN Severe Pain (7 - 10)  tiZANidine 4 milliGRAM(s) Oral every 6 hours PRN Muscle Spasm      Vital Signs Last 24 Hrs  T(C): 36.8 (31 Aug 2019 06:10), Max: 36.8 (30 Aug 2019 12:42)  T(F): 98.3 (31 Aug 2019 06:10), Max: 98.3 (30 Aug 2019 12:42)  HR: 76 (31 Aug 2019 06:10) (76 - 85)  BP: 136/87 (31 Aug 2019 06:10) (136/87 - 160/112)  BP(mean): --  RR: 18 (31 Aug 2019 06:10) (16 - 18)  SpO2: 100% (31 Aug 2019 06:10) (97% - 100%)  nc/at  s1s2  cta  soft, nt, nd no guarding or rebound  no c/c/e

## 2019-08-31 NOTE — PROGRESS NOTE ADULT - SUBJECTIVE AND OBJECTIVE BOX
[facility] [place]   MD JEYSON GALAN DO ANGELA WONG, PA    TEL:  OFFICE: [phone]  DR FORREST CELL: [phone]  DR. OCHOA CELL: [phone]  MARAL CHUN CELL: [phone]        Patient is a 54y old  Male who presents with a chief complaint of back pain and sciatica (31 Aug 2019 09:00)      Patient seen and examined at bedside. No chest pain/sob    VITALS:  T(F): 97.8 (08-31-19 @ 14:59), Max: 98.3 (08-31-19 @ 06:10)  HR: 67 (08-31-19 @ 14:59)  BP: 137/68 (08-31-19 @ 14:59)  RR: 18 (08-31-19 @ 14:59)  SpO2: 100% (08-31-19 @ 14:59)  Wt(kg): --        PHYSICAL EXAM:  Constitutional: NAD  Neck: No JVD  Respiratory: CTAB, no wheezes, rales or rhonchi  Cardiovascular: S1, S2, RRR  Gastrointestinal: BS+, soft, NT/ND  Extremities: No peripheral edema    Hospital Medications:   MEDICATIONS  (STANDING):  enalapril 20 milliGRAM(s) Oral every 12 hours  gabapentin 800 milliGRAM(s) Oral three times a day  hydrALAZINE 25 milliGRAM(s) Oral three times a day  lidocaine   Patch 1 Patch Transdermal daily  melatonin 6 milliGRAM(s) Oral at bedtime  multivitamin      multivitamin 1 Tablet(s) Oral daily  nafcillin  IVPB      nafcillin  IVPB 2 Gram(s) IV Intermittent every 4 hours  nicotine -  14 mG/24Hr(s) Patch 1 patch Transdermal daily  nortriptyline 25 milliGRAM(s) Oral at bedtime  nystatin Powder 1 Application(s) Topical two times a day  oxyCODONE  ER Tablet 20 milliGRAM(s) Oral every 12 hours  pantoprazole    Tablet 40 milliGRAM(s) Oral before breakfast      LABS:        Creatinine Trend: 1.02 <--, 1.10 <--            Urine Studies:  Urinalysis - [08-12-19 @ 21:20]      Color YELLOW / Appearance CLEAR / SG 1.033 / pH 5.5      Gluc NEGATIVE / Ketone NEGATIVE  / Bili NEGATIVE / Urobili NORMAL       Blood NEGATIVE / Protein 30 / Leuk Est NEGATIVE / Nitrite NEGATIVE      RBC 0-2 / WBC 3-5 / Hyaline NEGATIVE / Gran  / Sq Epi OCC / Non Sq Epi  / Bacteria NEGATIVE        HCV 9.89, Reactive      [08-05-19 @ 05:25]  HIV Nonreactive The HIV Ag/Ab Combo test performed screens for HIV-1 p24  antigen, antibodies to HIV-1 (group M and group O), and  antibodies to HIV-2. All specimens repeatedly reactive  will reflex to an HIV 1/2 antibody confirmation and  differentiation test. This assay detects p24 antigen which  may be present prior to the development of HIV antibodies,  therefore a reactive result with a negative HIV 1/2 AB  Confirmation should be followed up with HIV-1 RNA, HIV-2  RNA and repeat testing in 4-8 weeks. A nonreactive result  does not preclude previous exposure to or infection with  HIV-1 or HIV-2. [facility] prohibits disclosure of this  result to any unauthorized party.      [08-10-19 @ 06:00]      RADIOLOGY & ADDITIONAL STUDIES:

## 2019-08-31 NOTE — PROGRESS NOTE ADULT - SUBJECTIVE AND OBJECTIVE BOX
MARCIO LUZ  54y  Male      Patient is a 54y old  Male who presents with a chief complaint of back pain and sciatica (31 Aug 2019 15:40)  Feels better.no sob,no cp,no fever,no cough,wants to see ortho-spine for back pain    REVIEW OF SYSTEMS:  neg    INTERVAL HPI/OVERNIGHT EVENTS:  T(C): 36.6 (08-31-19 @ 14:59), Max: 36.8 (08-31-19 @ 06:10)  HR: 67 (08-31-19 @ 14:59) (67 - 85)  BP: 137/68 (08-31-19 @ 14:59) (136/87 - 151/104)  RR: 18 (08-31-19 @ 14:59) (18 - 18)  SpO2: 100% (08-31-19 @ 14:59) (97% - 100%)  Wt(kg): --  I&O's Summary    T(C): 36.6 (08-31-19 @ 14:59), Max: 36.8 (08-31-19 @ 06:10)  HR: 67 (08-31-19 @ 14:59) (67 - 85)  BP: 137/68 (08-31-19 @ 14:59) (136/87 - 151/104)  RR: 18 (08-31-19 @ 14:59) (18 - 18)  SpO2: 100% (08-31-19 @ 14:59) (97% - 100%)  Wt(kg): --Vital Signs Last 24 Hrs  T(C): 36.6 (31 Aug 2019 14:59), Max: 36.8 (31 Aug 2019 06:10)  T(F): 97.8 (31 Aug 2019 14:59), Max: 98.3 (31 Aug 2019 06:10)  HR: 67 (31 Aug 2019 14:59) (67 - 85)  BP: 137/68 (31 Aug 2019 14:59) (136/87 - 151/104)  BP(mean): --  RR: 18 (31 Aug 2019 14:59) (18 - 18)  SpO2: 100% (31 Aug 2019 14:59) (97% - 100%)    LABS:              CAPILLARY BLOOD GLUCOSE                PAST MEDICAL & SURGICAL HISTORY:  Sciatica  S/P ORIF (open reduction internal fixation) fracture      MEDICATIONS  (STANDING):  enalapril 20 milliGRAM(s) Oral every 12 hours  gabapentin 800 milliGRAM(s) Oral three times a day  hydrALAZINE 25 milliGRAM(s) Oral three times a day  lidocaine   Patch 1 Patch Transdermal daily  melatonin 6 milliGRAM(s) Oral at bedtime  multivitamin      multivitamin 1 Tablet(s) Oral daily  nafcillin  IVPB      nafcillin  IVPB 2 Gram(s) IV Intermittent every 4 hours  nicotine -  14 mG/24Hr(s) Patch 1 patch Transdermal daily  nortriptyline 25 milliGRAM(s) Oral at bedtime  nystatin Powder 1 Application(s) Topical two times a day  oxyCODONE  ER Tablet 20 milliGRAM(s) Oral every 12 hours  pantoprazole    Tablet 40 milliGRAM(s) Oral before breakfast    MEDICATIONS  (PRN):  cloNIDine 0.1 milliGRAM(s) Oral two times a day PRN withdrawl symptoms  hydrOXYzine hydrochloride 50 milliGRAM(s) Oral every 8 hours PRN Anxiety  oxyCODONE    5 mG/acetaminophen 325 mG 2 Tablet(s) Oral every 6 hours PRN Severe Pain (7 - 10)  tiZANidine 4 milliGRAM(s) Oral every 6 hours PRN Muscle Spasm        RADIOLOGY & ADDITIONAL TESTS:    Imaging Personally Reviewed:  [ ] YES  [ ] NO    Consultant(s) Notes Reviewed:  [x ] YES  [ ] NO    PHYSICAL EXAM:  GENERAL: NAD, well-groomed, well-developed  HEAD:  Atraumatic, Normocephalic  EYES: EOMI, PERRLA, conjunctiva and sclera clear  ENMT: No tonsillar erythema, exudates, or enlargement; Moist mucous membranes, Good dentition, No lesions  NECK: Supple, No JVD, Normal thyroid  NERVOUS SYSTEM:  Alert & Oriented X3, Good concentration; Motor Strength 5/5 B/L upper and lower extremities; DTRs 2+ intact and symmetric  CHEST/LUNG: Clear to percussion bilaterally; No rales, rhonchi, wheezing, or rubs  HEART: Regular rate and rhythm; No murmurs, rubs, or gallops  ABDOMEN: Soft, Nontender, Nondistended; Bowel sounds present  EXTREMITIES:  2+ Peripheral Pulses, No clubbing, cyanosis, or edema  LYMPH: No lymphadenopathy noted  SKIN: No rashes or lesions    Care Discussed with Consultants/Other Providers [x ] YES  [ ] NO      Code Status: [] Full Code [] DNR [] DNI [] Goals of Care:   Disposition: [] ICU [] Stroke Unit [] RCU []PCU []Floor [] Discharge Home         JEANINE RodriguezP

## 2019-09-01 RX ADMIN — OXYCODONE AND ACETAMINOPHEN 2 TABLET(S): 5; 325 TABLET ORAL at 13:22

## 2019-09-01 RX ADMIN — OXYCODONE HYDROCHLORIDE 20 MILLIGRAM(S): 5 TABLET ORAL at 01:54

## 2019-09-01 RX ADMIN — OXYCODONE AND ACETAMINOPHEN 2 TABLET(S): 5; 325 TABLET ORAL at 06:04

## 2019-09-01 RX ADMIN — NAFCILLIN 200 GRAM(S): 10 INJECTION, POWDER, FOR SOLUTION INTRAVENOUS at 20:55

## 2019-09-01 RX ADMIN — OXYCODONE HYDROCHLORIDE 20 MILLIGRAM(S): 5 TABLET ORAL at 14:21

## 2019-09-01 RX ADMIN — GABAPENTIN 800 MILLIGRAM(S): 400 CAPSULE ORAL at 06:04

## 2019-09-01 RX ADMIN — OXYCODONE AND ACETAMINOPHEN 2 TABLET(S): 5; 325 TABLET ORAL at 12:04

## 2019-09-01 RX ADMIN — NAFCILLIN 200 GRAM(S): 10 INJECTION, POWDER, FOR SOLUTION INTRAVENOUS at 06:04

## 2019-09-01 RX ADMIN — OXYCODONE AND ACETAMINOPHEN 2 TABLET(S): 5; 325 TABLET ORAL at 19:32

## 2019-09-01 RX ADMIN — Medication 20 MILLIGRAM(S): at 17:30

## 2019-09-01 RX ADMIN — GABAPENTIN 800 MILLIGRAM(S): 400 CAPSULE ORAL at 14:22

## 2019-09-01 RX ADMIN — TIZANIDINE 4 MILLIGRAM(S): 4 TABLET ORAL at 01:55

## 2019-09-01 RX ADMIN — OXYCODONE AND ACETAMINOPHEN 2 TABLET(S): 5; 325 TABLET ORAL at 00:58

## 2019-09-01 RX ADMIN — OXYCODONE AND ACETAMINOPHEN 2 TABLET(S): 5; 325 TABLET ORAL at 02:00

## 2019-09-01 RX ADMIN — Medication 1 PATCH: at 21:56

## 2019-09-01 RX ADMIN — Medication 20 MILLIGRAM(S): at 06:05

## 2019-09-01 RX ADMIN — Medication 1 PATCH: at 01:44

## 2019-09-01 RX ADMIN — Medication 1 PATCH: at 05:52

## 2019-09-01 RX ADMIN — NAFCILLIN 200 GRAM(S): 10 INJECTION, POWDER, FOR SOLUTION INTRAVENOUS at 17:26

## 2019-09-01 RX ADMIN — OXYCODONE HYDROCHLORIDE 20 MILLIGRAM(S): 5 TABLET ORAL at 15:53

## 2019-09-01 RX ADMIN — NYSTATIN CREAM 1 APPLICATION(S): 100000 CREAM TOPICAL at 17:27

## 2019-09-01 RX ADMIN — Medication 1 TABLET(S): at 14:22

## 2019-09-01 RX ADMIN — OXYCODONE AND ACETAMINOPHEN 2 TABLET(S): 5; 325 TABLET ORAL at 18:14

## 2019-09-01 RX ADMIN — TIZANIDINE 4 MILLIGRAM(S): 4 TABLET ORAL at 14:27

## 2019-09-01 RX ADMIN — Medication 1 PATCH: at 23:18

## 2019-09-01 RX ADMIN — NAFCILLIN 200 GRAM(S): 10 INJECTION, POWDER, FOR SOLUTION INTRAVENOUS at 01:55

## 2019-09-01 RX ADMIN — OXYCODONE HYDROCHLORIDE 20 MILLIGRAM(S): 5 TABLET ORAL at 03:00

## 2019-09-01 RX ADMIN — Medication 1 PATCH: at 20:43

## 2019-09-01 RX ADMIN — NAFCILLIN 200 GRAM(S): 10 INJECTION, POWDER, FOR SOLUTION INTRAVENOUS at 10:31

## 2019-09-01 RX ADMIN — GABAPENTIN 800 MILLIGRAM(S): 400 CAPSULE ORAL at 21:57

## 2019-09-01 NOTE — PROGRESS NOTE ADULT - SUBJECTIVE AND OBJECTIVE BOX
LUZ BUCKLEY:[id number],   54yMale followed for:  No Known Allergies    PAST MEDICAL & SURGICAL HISTORY:  Sciatica  S/P ORIF (open reduction internal fixation) fracture    FAMILY HISTORY:    MEDICATIONS  (STANDING):  enalapril 20 milliGRAM(s) Oral every 12 hours  gabapentin 800 milliGRAM(s) Oral three times a day  hydrALAZINE 25 milliGRAM(s) Oral three times a day  lidocaine   Patch 1 Patch Transdermal daily  melatonin 6 milliGRAM(s) Oral at bedtime  multivitamin      multivitamin 1 Tablet(s) Oral daily  nafcillin  IVPB      nafcillin  IVPB 2 Gram(s) IV Intermittent every 4 hours  nicotine -  14 mG/24Hr(s) Patch 1 patch Transdermal daily  nortriptyline 25 milliGRAM(s) Oral at bedtime  nystatin Powder 1 Application(s) Topical two times a day  oxyCODONE  ER Tablet 20 milliGRAM(s) Oral every 12 hours  pantoprazole    Tablet 40 milliGRAM(s) Oral before breakfast    MEDICATIONS  (PRN):  cloNIDine 0.1 milliGRAM(s) Oral two times a day PRN withdrawl symptoms  hydrOXYzine hydrochloride 50 milliGRAM(s) Oral every 8 hours PRN Anxiety  oxyCODONE    5 mG/acetaminophen 325 mG 2 Tablet(s) Oral every 6 hours PRN Severe Pain (7 - 10)  tiZANidine 4 milliGRAM(s) Oral every 6 hours PRN Muscle Spasm      Vital Signs Last 24 Hrs  T(C): 36.4 (01 Sep 2019 06:03), Max: 36.7 (31 Aug 2019 20:05)  T(F): 97.5 (01 Sep 2019 06:03), Max: 98 (31 Aug 2019 20:05)  HR: 69 (01 Sep 2019 06:03) (67 - 73)  BP: 142/72 (01 Sep 2019 06:03) (129/84 - 142/72)  BP(mean): --  RR: 16 (01 Sep 2019 06:03) (16 - 19)  SpO2: 99% (01 Sep 2019 06:03) (97% - 100%)  nc/at  s1s2  cta  soft, nt, nd no guarding or rebound  no c/c/e

## 2019-09-01 NOTE — PROGRESS NOTE ADULT - SUBJECTIVE AND OBJECTIVE BOX
MARCIO LUZ  54y  Male      Patient is a 54y old  Male who presents with a chief complaint of back pain and sciatica (01 Sep 2019 10:28)  comfortable,no back pain,no fever,no sob,no cp    REVIEW OF SYSTEMS:  as above        INTERVAL HPI/OVERNIGHT EVENTS:  T(C): 36.8 (09-01-19 @ 20:52), Max: 37.2 (09-01-19 @ 14:54)  HR: 85 (09-01-19 @ 21:04) (69 - 85)  BP: 123/88 (09-01-19 @ 21:04) (123/88 - 143/106)  RR: 17 (09-01-19 @ 20:52) (16 - 18)  SpO2: 98% (09-01-19 @ 20:52) (98% - 100%)  Wt(kg): --  I&O's Summary    01 Sep 2019 07:01  -  01 Sep 2019 21:38  --------------------------------------------------------  IN: 0 mL / OUT: 400 mL / NET: -400 mL      T(C): 36.8 (09-01-19 @ 20:52), Max: 37.2 (09-01-19 @ 14:54)  HR: 85 (09-01-19 @ 21:04) (69 - 85)  BP: 123/88 (09-01-19 @ 21:04) (123/88 - 143/106)  RR: 17 (09-01-19 @ 20:52) (16 - 18)  SpO2: 98% (09-01-19 @ 20:52) (98% - 100%)  Wt(kg): --Vital Signs Last 24 Hrs  T(C): 36.8 (01 Sep 2019 20:52), Max: 37.2 (01 Sep 2019 14:54)  T(F): 98.3 (01 Sep 2019 20:52), Max: 99 (01 Sep 2019 14:54)  HR: 85 (01 Sep 2019 21:04) (69 - 85)  BP: 123/88 (01 Sep 2019 21:04) (123/88 - 143/106)  BP(mean): --  RR: 17 (01 Sep 2019 20:52) (16 - 18)  SpO2: 98% (01 Sep 2019 20:52) (98% - 100%)    LABS:              CAPILLARY BLOOD GLUCOSE                PAST MEDICAL & SURGICAL HISTORY:  Sciatica  S/P ORIF (open reduction internal fixation) fracture      MEDICATIONS  (STANDING):  enalapril 20 milliGRAM(s) Oral every 12 hours  gabapentin 800 milliGRAM(s) Oral three times a day  hydrALAZINE 25 milliGRAM(s) Oral three times a day  lidocaine   Patch 1 Patch Transdermal daily  melatonin 6 milliGRAM(s) Oral at bedtime  multivitamin      multivitamin 1 Tablet(s) Oral daily  nafcillin  IVPB      nafcillin  IVPB 2 Gram(s) IV Intermittent every 4 hours  nicotine -  14 mG/24Hr(s) Patch 1 patch Transdermal daily  nortriptyline 25 milliGRAM(s) Oral at bedtime  nystatin Powder 1 Application(s) Topical two times a day  oxyCODONE  ER Tablet 20 milliGRAM(s) Oral every 12 hours  pantoprazole    Tablet 40 milliGRAM(s) Oral before breakfast    MEDICATIONS  (PRN):  cloNIDine 0.1 milliGRAM(s) Oral two times a day PRN withdrawl symptoms  hydrOXYzine hydrochloride 50 milliGRAM(s) Oral every 8 hours PRN Anxiety  oxyCODONE    5 mG/acetaminophen 325 mG 2 Tablet(s) Oral every 6 hours PRN Severe Pain (7 - 10)  tiZANidine 4 milliGRAM(s) Oral every 6 hours PRN Muscle Spasm        RADIOLOGY & ADDITIONAL TESTS:    Imaging Personally Reviewed:  [ ] YES  [ ] NO    Consultant(s) Notes Reviewed:  [ ] YES  [ ] NO    PHYSICAL EXAM:  GENERAL: NAD, well-groomed, well-developed  HEAD:  Atraumatic, Normocephalic  EYES: EOMI, PERRLA, conjunctiva and sclera clear  ENMT: No tonsillar erythema, exudates, or enlargement; Moist mucous membranes, Good dentition, No lesions  NECK: Supple, No JVD, Normal thyroid  NERVOUS SYSTEM:  Alert & Oriented X3, nonfocal  CHEST/LUNG: Clear to percussion bilaterally; No rales, rhonchi, wheezing, or rubs  HEART: Regular rate and rhythm; No murmurs, rubs, or gallops  ABDOMEN: Soft, Nontender, Nondistended; Bowel sounds present  EXTREMITIES:  2+ Peripheral Pulses, No clubbing, cyanosis, or edema  LYMPH: No lymphadenopathy noted  SKIN: No rashes or lesions    Care Discussed with Consultants/Other Providers [ ] YES  [ ] NO      Code Status: [] Full Code [] DNR [] DNI [] Goals of Care:   Disposition: [] ICU [] Stroke Unit [] RCU []PCU []Floor [] Discharge Home         CECY Rodriguez.FACP

## 2019-09-02 RX ORDER — HYDROCORTISONE 1 %
1 OINTMENT (GRAM) TOPICAL ONCE
Refills: 0 | Status: COMPLETED | OUTPATIENT
Start: 2019-09-02 | End: 2019-09-03

## 2019-09-02 RX ORDER — DIPHENHYDRAMINE HCL 50 MG
25 CAPSULE ORAL ONCE
Refills: 0 | Status: COMPLETED | OUTPATIENT
Start: 2019-09-02 | End: 2019-09-02

## 2019-09-02 RX ORDER — ACETAMINOPHEN 500 MG
325 TABLET ORAL ONCE
Refills: 0 | Status: COMPLETED | OUTPATIENT
Start: 2019-09-02 | End: 2019-09-02

## 2019-09-02 RX ADMIN — NAFCILLIN 200 GRAM(S): 10 INJECTION, POWDER, FOR SOLUTION INTRAVENOUS at 17:32

## 2019-09-02 RX ADMIN — Medication 20 MILLIGRAM(S): at 18:23

## 2019-09-02 RX ADMIN — Medication 1 PATCH: at 18:18

## 2019-09-02 RX ADMIN — Medication 6 MILLIGRAM(S): at 22:34

## 2019-09-02 RX ADMIN — Medication 1 TABLET(S): at 12:59

## 2019-09-02 RX ADMIN — OXYCODONE HYDROCHLORIDE 20 MILLIGRAM(S): 5 TABLET ORAL at 02:09

## 2019-09-02 RX ADMIN — OXYCODONE AND ACETAMINOPHEN 2 TABLET(S): 5; 325 TABLET ORAL at 01:07

## 2019-09-02 RX ADMIN — TIZANIDINE 4 MILLIGRAM(S): 4 TABLET ORAL at 16:18

## 2019-09-02 RX ADMIN — NYSTATIN CREAM 1 APPLICATION(S): 100000 CREAM TOPICAL at 05:22

## 2019-09-02 RX ADMIN — Medication 1 PATCH: at 22:33

## 2019-09-02 RX ADMIN — GABAPENTIN 800 MILLIGRAM(S): 400 CAPSULE ORAL at 14:02

## 2019-09-02 RX ADMIN — OXYCODONE AND ACETAMINOPHEN 2 TABLET(S): 5; 325 TABLET ORAL at 19:35

## 2019-09-02 RX ADMIN — OXYCODONE AND ACETAMINOPHEN 2 TABLET(S): 5; 325 TABLET ORAL at 19:00

## 2019-09-02 RX ADMIN — OXYCODONE AND ACETAMINOPHEN 2 TABLET(S): 5; 325 TABLET ORAL at 12:59

## 2019-09-02 RX ADMIN — NAFCILLIN 200 GRAM(S): 10 INJECTION, POWDER, FOR SOLUTION INTRAVENOUS at 08:56

## 2019-09-02 RX ADMIN — GABAPENTIN 800 MILLIGRAM(S): 400 CAPSULE ORAL at 22:33

## 2019-09-02 RX ADMIN — NAFCILLIN 200 GRAM(S): 10 INJECTION, POWDER, FOR SOLUTION INTRAVENOUS at 22:33

## 2019-09-02 RX ADMIN — Medication 1 PATCH: at 07:53

## 2019-09-02 RX ADMIN — Medication 6 MILLIGRAM(S): at 00:37

## 2019-09-02 RX ADMIN — Medication 20 MILLIGRAM(S): at 05:26

## 2019-09-02 RX ADMIN — OXYCODONE HYDROCHLORIDE 20 MILLIGRAM(S): 5 TABLET ORAL at 02:39

## 2019-09-02 RX ADMIN — Medication 325 MILLIGRAM(S): at 08:56

## 2019-09-02 RX ADMIN — OXYCODONE AND ACETAMINOPHEN 2 TABLET(S): 5; 325 TABLET ORAL at 06:56

## 2019-09-02 RX ADMIN — OXYCODONE HYDROCHLORIDE 20 MILLIGRAM(S): 5 TABLET ORAL at 14:02

## 2019-09-02 RX ADMIN — Medication 1 PATCH: at 22:36

## 2019-09-02 RX ADMIN — Medication 25 MILLIGRAM(S): at 22:32

## 2019-09-02 RX ADMIN — NAFCILLIN 200 GRAM(S): 10 INJECTION, POWDER, FOR SOLUTION INTRAVENOUS at 12:59

## 2019-09-02 RX ADMIN — OXYCODONE AND ACETAMINOPHEN 2 TABLET(S): 5; 325 TABLET ORAL at 07:26

## 2019-09-02 RX ADMIN — NAFCILLIN 200 GRAM(S): 10 INJECTION, POWDER, FOR SOLUTION INTRAVENOUS at 05:20

## 2019-09-02 RX ADMIN — NYSTATIN CREAM 1 APPLICATION(S): 100000 CREAM TOPICAL at 17:33

## 2019-09-02 RX ADMIN — NAFCILLIN 200 GRAM(S): 10 INJECTION, POWDER, FOR SOLUTION INTRAVENOUS at 00:40

## 2019-09-02 RX ADMIN — OXYCODONE AND ACETAMINOPHEN 2 TABLET(S): 5; 325 TABLET ORAL at 13:45

## 2019-09-02 RX ADMIN — TIZANIDINE 4 MILLIGRAM(S): 4 TABLET ORAL at 00:49

## 2019-09-02 RX ADMIN — GABAPENTIN 800 MILLIGRAM(S): 400 CAPSULE ORAL at 05:20

## 2019-09-02 RX ADMIN — OXYCODONE AND ACETAMINOPHEN 2 TABLET(S): 5; 325 TABLET ORAL at 00:37

## 2019-09-02 RX ADMIN — Medication 325 MILLIGRAM(S): at 09:30

## 2019-09-02 NOTE — PROGRESS NOTE ADULT - SUBJECTIVE AND OBJECTIVE BOX
MARCIO LUZ  54y  Male      Patient is a 54y old  Male who presents with a chief complaint of back pain and sciatica (02 Sep 2019 10:08)  still wants to see spine surgeion? pain is controlled in back.no fever,no sob,no cp    REVIEW OF SYSTEMS:  neg    INTERVAL HPI/OVERNIGHT EVENTS:  T(C): 37 (09-02-19 @ 18:21), Max: 37 (09-02-19 @ 18:21)  HR: 70 (09-02-19 @ 18:21) (68 - 85)  BP: 124/85 (09-02-19 @ 18:21) (123/88 - 140/95)  RR: 16 (09-02-19 @ 18:21) (16 - 17)  SpO2: 100% (09-02-19 @ 18:21) (98% - 100%)  Wt(kg): --  I&O's Summary    01 Sep 2019 07:01  -  02 Sep 2019 07:00  --------------------------------------------------------  IN: 300 mL / OUT: 600 mL / NET: -300 mL    02 Sep 2019 07:01  -  02 Sep 2019 20:34  --------------------------------------------------------  IN: 300 mL / OUT: 0 mL / NET: 300 mL      T(C): 37 (09-02-19 @ 18:21), Max: 37 (09-02-19 @ 18:21)  HR: 70 (09-02-19 @ 18:21) (68 - 85)  BP: 124/85 (09-02-19 @ 18:21) (123/88 - 140/95)  RR: 16 (09-02-19 @ 18:21) (16 - 17)  SpO2: 100% (09-02-19 @ 18:21) (98% - 100%)  Wt(kg): --Vital Signs Last 24 Hrs  T(C): 37 (02 Sep 2019 18:21), Max: 37 (02 Sep 2019 18:21)  T(F): 98.6 (02 Sep 2019 18:21), Max: 98.6 (02 Sep 2019 18:21)  HR: 70 (02 Sep 2019 18:21) (68 - 85)  BP: 124/85 (02 Sep 2019 18:21) (123/88 - 140/95)  BP(mean): --  RR: 16 (02 Sep 2019 18:21) (16 - 17)  SpO2: 100% (02 Sep 2019 18:21) (98% - 100%)    LABS:              CAPILLARY BLOOD GLUCOSE                PAST MEDICAL & SURGICAL HISTORY:  Sciatica  S/P ORIF (open reduction internal fixation) fracture      MEDICATIONS  (STANDING):  enalapril 20 milliGRAM(s) Oral every 12 hours  gabapentin 800 milliGRAM(s) Oral three times a day  hydrALAZINE 25 milliGRAM(s) Oral three times a day  lidocaine   Patch 1 Patch Transdermal daily  melatonin 6 milliGRAM(s) Oral at bedtime  multivitamin      multivitamin 1 Tablet(s) Oral daily  nafcillin  IVPB      nafcillin  IVPB 2 Gram(s) IV Intermittent every 4 hours  nicotine -  14 mG/24Hr(s) Patch 1 patch Transdermal daily  nortriptyline 25 milliGRAM(s) Oral at bedtime  nystatin Powder 1 Application(s) Topical two times a day  oxyCODONE  ER Tablet 20 milliGRAM(s) Oral every 12 hours  pantoprazole    Tablet 40 milliGRAM(s) Oral before breakfast    MEDICATIONS  (PRN):  cloNIDine 0.1 milliGRAM(s) Oral two times a day PRN withdrawl symptoms  hydrOXYzine hydrochloride 50 milliGRAM(s) Oral every 8 hours PRN Anxiety  oxyCODONE    5 mG/acetaminophen 325 mG 2 Tablet(s) Oral every 6 hours PRN Severe Pain (7 - 10)  tiZANidine 4 milliGRAM(s) Oral every 6 hours PRN Muscle Spasm        RADIOLOGY & ADDITIONAL TESTS:    Imaging Personally Reviewed:  [ ] YES  [ ] NO    Consultant(s) Notes Reviewed:  [ ] YES  [ ] NO    PHYSICAL EXAM:  GENERAL: NAD, well-groomed, well-developed  HEAD:  Atraumatic, Normocephalic  EYES: EOMI, PERRLA, conjunctiva and sclera clear  ENMT: No tonsillar erythema, exudates, or enlargement; Moist mucous membranes, Good dentition, No lesions  NECK: Supple, No JVD, Normal thyroid  NERVOUS SYSTEM:  Alert & Oriented X3, nonfocal  CHEST/LUNG: Clear to percussion bilaterally; No rales, rhonchi, wheezing, or rubs  HEART: Regular rate and rhythm; No murmurs, rubs, or gallops  ABDOMEN: Soft, Nontender, Nondistended; Bowel sounds present  EXTREMITIES:  2+ Peripheral Pulses, No clubbing, cyanosis, or edema  LYMPH: No lymphadenopathy noted  SKIN: No rashes or lesions    Care Discussed with Consultants/Other Providers [ ] YES  [ ] NO      Code Status: [] Full Code [] DNR [] DNI [] Goals of Care:   Disposition: [] ICU [] Stroke Unit [] RCU []PCU []Floor [] Discharge Home         CECY Rodriguez.[facility]

## 2019-09-02 NOTE — PROGRESS NOTE ADULT - SUBJECTIVE AND OBJECTIVE BOX
LUZ BUCKLEY:[id number],   54yMale followed for:  No Known Allergies    PAST MEDICAL & SURGICAL HISTORY:  Sciatica  S/P ORIF (open reduction internal fixation) fracture    FAMILY HISTORY:    MEDICATIONS  (STANDING):  enalapril 20 milliGRAM(s) Oral every 12 hours  gabapentin 800 milliGRAM(s) Oral three times a day  hydrALAZINE 25 milliGRAM(s) Oral three times a day  lidocaine   Patch 1 Patch Transdermal daily  melatonin 6 milliGRAM(s) Oral at bedtime  multivitamin      multivitamin 1 Tablet(s) Oral daily  nafcillin  IVPB      nafcillin  IVPB 2 Gram(s) IV Intermittent every 4 hours  nicotine -  14 mG/24Hr(s) Patch 1 patch Transdermal daily  nortriptyline 25 milliGRAM(s) Oral at bedtime  nystatin Powder 1 Application(s) Topical two times a day  oxyCODONE  ER Tablet 20 milliGRAM(s) Oral every 12 hours  pantoprazole    Tablet 40 milliGRAM(s) Oral before breakfast    MEDICATIONS  (PRN):  cloNIDine 0.1 milliGRAM(s) Oral two times a day PRN withdrawl symptoms  hydrOXYzine hydrochloride 50 milliGRAM(s) Oral every 8 hours PRN Anxiety  oxyCODONE    5 mG/acetaminophen 325 mG 2 Tablet(s) Oral every 6 hours PRN Severe Pain (7 - 10)  tiZANidine 4 milliGRAM(s) Oral every 6 hours PRN Muscle Spasm      Vital Signs Last 24 Hrs  T(C): 36.4 (02 Sep 2019 05:18), Max: 37.2 (01 Sep 2019 14:54)  T(F): 97.5 (02 Sep 2019 05:18), Max: 99 (01 Sep 2019 14:54)  HR: 68 (02 Sep 2019 05:18) (68 - 85)  BP: 140/95 (02 Sep 2019 05:18) (123/88 - 143/106)  BP(mean): --  RR: 17 (02 Sep 2019 05:18) (17 - 18)  SpO2: 100% (02 Sep 2019 05:18) (98% - 100%)  nc/at  s1s2  cta  soft, nt, nd no guarding or rebound  no c/c/e

## 2019-09-03 PROCEDURE — 99232 SBSQ HOSP IP/OBS MODERATE 35: CPT

## 2019-09-03 RX ORDER — HYDROCORTISONE 1 %
1 OINTMENT (GRAM) TOPICAL ONCE
Refills: 0 | Status: COMPLETED | OUTPATIENT
Start: 2019-09-03 | End: 2019-09-03

## 2019-09-03 RX ORDER — OXYCODONE HYDROCHLORIDE 5 MG/1
5 TABLET ORAL ONCE
Refills: 0 | Status: DISCONTINUED | OUTPATIENT
Start: 2019-09-03 | End: 2019-09-03

## 2019-09-03 RX ORDER — HYDROCORTISONE 1 %
1 OINTMENT (GRAM) TOPICAL ONCE
Refills: 0 | Status: DISCONTINUED | OUTPATIENT
Start: 2019-09-03 | End: 2019-09-03

## 2019-09-03 RX ORDER — OXYCODONE AND ACETAMINOPHEN 5; 325 MG/1; MG/1
2 TABLET ORAL EVERY 6 HOURS
Refills: 0 | Status: DISCONTINUED | OUTPATIENT
Start: 2019-09-03 | End: 2019-09-05

## 2019-09-03 RX ADMIN — OXYCODONE AND ACETAMINOPHEN 2 TABLET(S): 5; 325 TABLET ORAL at 15:15

## 2019-09-03 RX ADMIN — NAFCILLIN 200 GRAM(S): 10 INJECTION, POWDER, FOR SOLUTION INTRAVENOUS at 06:59

## 2019-09-03 RX ADMIN — Medication 6 MILLIGRAM(S): at 23:08

## 2019-09-03 RX ADMIN — Medication 1 APPLICATION(S): at 00:32

## 2019-09-03 RX ADMIN — Medication 1 PATCH: at 20:10

## 2019-09-03 RX ADMIN — Medication 1 PATCH: at 23:09

## 2019-09-03 RX ADMIN — OXYCODONE HYDROCHLORIDE 20 MILLIGRAM(S): 5 TABLET ORAL at 14:39

## 2019-09-03 RX ADMIN — Medication 20 MILLIGRAM(S): at 17:00

## 2019-09-03 RX ADMIN — OXYCODONE HYDROCHLORIDE 20 MILLIGRAM(S): 5 TABLET ORAL at 14:10

## 2019-09-03 RX ADMIN — TIZANIDINE 4 MILLIGRAM(S): 4 TABLET ORAL at 12:43

## 2019-09-03 RX ADMIN — OXYCODONE HYDROCHLORIDE 20 MILLIGRAM(S): 5 TABLET ORAL at 03:16

## 2019-09-03 RX ADMIN — Medication 1 PATCH: at 07:32

## 2019-09-03 RX ADMIN — OXYCODONE AND ACETAMINOPHEN 2 TABLET(S): 5; 325 TABLET ORAL at 01:26

## 2019-09-03 RX ADMIN — OXYCODONE AND ACETAMINOPHEN 2 TABLET(S): 5; 325 TABLET ORAL at 14:46

## 2019-09-03 RX ADMIN — OXYCODONE AND ACETAMINOPHEN 2 TABLET(S): 5; 325 TABLET ORAL at 20:22

## 2019-09-03 RX ADMIN — OXYCODONE AND ACETAMINOPHEN 2 TABLET(S): 5; 325 TABLET ORAL at 08:51

## 2019-09-03 RX ADMIN — Medication 20 MILLIGRAM(S): at 07:02

## 2019-09-03 RX ADMIN — GABAPENTIN 800 MILLIGRAM(S): 400 CAPSULE ORAL at 16:59

## 2019-09-03 RX ADMIN — Medication 1 APPLICATION(S): at 18:04

## 2019-09-03 RX ADMIN — OXYCODONE AND ACETAMINOPHEN 2 TABLET(S): 5; 325 TABLET ORAL at 21:20

## 2019-09-03 RX ADMIN — OXYCODONE AND ACETAMINOPHEN 2 TABLET(S): 5; 325 TABLET ORAL at 02:01

## 2019-09-03 RX ADMIN — OXYCODONE AND ACETAMINOPHEN 2 TABLET(S): 5; 325 TABLET ORAL at 07:27

## 2019-09-03 RX ADMIN — GABAPENTIN 800 MILLIGRAM(S): 400 CAPSULE ORAL at 07:00

## 2019-09-03 RX ADMIN — OXYCODONE HYDROCHLORIDE 20 MILLIGRAM(S): 5 TABLET ORAL at 02:37

## 2019-09-03 RX ADMIN — NYSTATIN CREAM 1 APPLICATION(S): 100000 CREAM TOPICAL at 17:00

## 2019-09-03 RX ADMIN — Medication 1 PATCH: at 23:07

## 2019-09-03 RX ADMIN — NAFCILLIN 200 GRAM(S): 10 INJECTION, POWDER, FOR SOLUTION INTRAVENOUS at 02:39

## 2019-09-03 RX ADMIN — TIZANIDINE 4 MILLIGRAM(S): 4 TABLET ORAL at 23:08

## 2019-09-03 RX ADMIN — TIZANIDINE 4 MILLIGRAM(S): 4 TABLET ORAL at 01:27

## 2019-09-03 RX ADMIN — Medication 1 TABLET(S): at 12:40

## 2019-09-03 RX ADMIN — NYSTATIN CREAM 1 APPLICATION(S): 100000 CREAM TOPICAL at 07:02

## 2019-09-03 RX ADMIN — NAFCILLIN 200 GRAM(S): 10 INJECTION, POWDER, FOR SOLUTION INTRAVENOUS at 17:00

## 2019-09-03 NOTE — PROGRESS NOTE ADULT - ASSESSMENT
54 m with IVDA, hepatitis C (not treated), RLE ORIF and hardware, previous bacteremia and lumbar osteo? 2004, presented with worsening back pain, with difficulty ambulating due to pain.  here afebrile normal WBC  blood cx: 2/2 MSSA  hep C viral load: [id number]  spine MRI: enhancing lesion on L3 s/o occult fx or a lesion but CT negative for FX or lesion  hiv negative    MSSA bacteremia and L3 osteo   active heroine use  repeat cx 8/12 negative   TTE negative  repeat MRI 8/19 with progressed enhancement and edema of L3 with perivertebral soft tissue swelling/enhancement, s/o evolving osteo, no epidural abscess    * ESR and CRP improving   * c/w Nafcillin 2 q 4  * pt can not be discharged with a line in view of active IVDA  * will have continue the course here or another rehab facility  * will do an 8 week course for L3 osteo until 10/7  * f/u with pain management  * will need weekly CBC, CMP, ESR and CRP

## 2019-09-03 NOTE — PROGRESS NOTE ADULT - SUBJECTIVE AND OBJECTIVE BOX
LUZ BUCKLEY  54y  Male      Patient is a 54y old  Male who presents with a chief complaint of back pain and sciatica (03 Sep 2019 12:11)  c/o increased back pain 8/10? wants higher dose of pain meds? wants private room? agitated and uncoperative to staff.  no sob,no cp,no fever    REVIEW OF SYSTEMS:  as above    INTERVAL HPI/OVERNIGHT EVENTS:  T(C): 36.7 (09-03-19 @ 14:48), Max: 36.7 (09-03-19 @ 06:55)  HR: 70 (09-03-19 @ 14:48) (69 - 72)  BP: 135/89 (09-03-19 @ 14:48) (135/89 - 152/99)  RR: 18 (09-03-19 @ 14:48) (16 - 18)  SpO2: 100% (09-03-19 @ 14:48) (99% - 100%)  Wt(kg): --  I&O's Summary    02 Sep 2019 07:01  -  03 Sep 2019 07:00  --------------------------------------------------------  IN: 500 mL / OUT: 350 mL / NET: 150 mL      T(C): 36.7 (09-03-19 @ 14:48), Max: 36.7 (09-03-19 @ 06:55)  HR: 70 (09-03-19 @ 14:48) (69 - 72)  BP: 135/89 (09-03-19 @ 14:48) (135/89 - 152/99)  RR: 18 (09-03-19 @ 14:48) (16 - 18)  SpO2: 100% (09-03-19 @ 14:48) (99% - 100%)  Wt(kg): --Vital Signs Last 24 Hrs  T(C): 36.7 (03 Sep 2019 14:48), Max: 36.7 (03 Sep 2019 06:55)  T(F): 98 (03 Sep 2019 14:48), Max: 98.1 (03 Sep 2019 06:55)  HR: 70 (03 Sep 2019 14:48) (69 - 72)  BP: 135/89 (03 Sep 2019 14:48) (135/89 - 152/99)  BP(mean): --  RR: 18 (03 Sep 2019 14:48) (16 - 18)  SpO2: 100% (03 Sep 2019 14:48) (99% - 100%)    LABS:              CAPILLARY BLOOD GLUCOSE                PAST MEDICAL & SURGICAL HISTORY:  Sciatica  S/P ORIF (open reduction internal fixation) fracture      MEDICATIONS  (STANDING):  enalapril 20 milliGRAM(s) Oral every 12 hours  gabapentin 800 milliGRAM(s) Oral three times a day  hydrALAZINE 25 milliGRAM(s) Oral three times a day  lidocaine   Patch 1 Patch Transdermal daily  melatonin 6 milliGRAM(s) Oral at bedtime  multivitamin      multivitamin 1 Tablet(s) Oral daily  nafcillin  IVPB      nafcillin  IVPB 2 Gram(s) IV Intermittent every 4 hours  nicotine -  14 mG/24Hr(s) Patch 1 patch Transdermal daily  nortriptyline 25 milliGRAM(s) Oral at bedtime  nystatin Powder 1 Application(s) Topical two times a day  oxyCODONE    IR 5 milliGRAM(s) Oral once  oxyCODONE  ER Tablet 20 milliGRAM(s) Oral every 12 hours  pantoprazole    Tablet 40 milliGRAM(s) Oral before breakfast    MEDICATIONS  (PRN):  cloNIDine 0.1 milliGRAM(s) Oral two times a day PRN withdrawl symptoms  hydrOXYzine hydrochloride 50 milliGRAM(s) Oral every 8 hours PRN Anxiety  oxyCODONE    5 mG/acetaminophen 325 mG 2 Tablet(s) Oral every 6 hours PRN Severe Pain (7 - 10)  tiZANidine 4 milliGRAM(s) Oral every 6 hours PRN Muscle Spasm        RADIOLOGY & ADDITIONAL TESTS:    Imaging Personally Reviewed:  [ ] YES  [ ] NO    Consultant(s) Notes Reviewed:  [x ] YES  [ ] NO    PHYSICAL EXAM:  GENERAL: NAD, well-groomed, well-developed  HEAD:  Atraumatic, Normocephalic  EYES: EOMI, PERRLA, conjunctiva and sclera clear  ENMT: No tonsillar erythema, exudates, or enlargement; Moist mucous membranes, Good dentition, No lesions  NECK: Supple, No JVD, Normal thyroid  NERVOUS SYSTEM:  Alert & Oriented X3, nonfocal.  CHEST/LUNG: Clear to percussion bilaterally; No rales, rhonchi, wheezing, or rubs  HEART: Regular rate and rhythm; No murmurs, rubs, or gallops  ABDOMEN: Soft, Nontender, Nondistended; Bowel sounds present  EXTREMITIES:  2+ Peripheral Pulses, No clubbing, cyanosis, or edema  LYMPH: No lymphadenopathy noted  SKIN: No rashes or lesions    Care Discussed with Consultants/Other Providers [ x] YES  [ ] NO      Code Status: [] Full Code [] DNR [] DNI [] Goals of Care:   Disposition: [] ICU [] Stroke Unit [] RCU []PCU []Floor [] Discharge Home         CECY Rodriguez.[facility]

## 2019-09-03 NOTE — PROGRESS NOTE ADULT - SUBJECTIVE AND OBJECTIVE BOX
Follow Up:  MSSA bacteremia    Interval History: pt stable and afebrile, no acute events    ROS:      All other systems negative    Constitutional: no fever, no chills, no weight loss, no night sweats  Eye: no eye pain, no redness, no vision changes  ENT:  no sore throat, no rhinorrhea  Cardiovascular:  no chest pain, no palpitation  Respiratory:  no SOB, no cough  GI:  no abd pain, no vomiting, no diarrhea  urinary: no dysuria, no hematuria, no flank pain  : no penile discharge or bleeding  musculoskeletal:   back pain  skin:  no rash  neurology:  no headache, no seizure, no change in mental status  Allergies  No Known Allergies        ANTIMICROBIALS:  nafcillin  IVPB    nafcillin  IVPB 2 every 4 hours      OTHER MEDS:  cloNIDine 0.1 milliGRAM(s) Oral two times a day PRN  enalapril 20 milliGRAM(s) Oral every 12 hours  gabapentin 800 milliGRAM(s) Oral three times a day  hydrALAZINE 25 milliGRAM(s) Oral three times a day  hydrOXYzine hydrochloride 50 milliGRAM(s) Oral every 8 hours PRN  lidocaine   Patch 1 Patch Transdermal daily  melatonin 6 milliGRAM(s) Oral at bedtime  multivitamin      multivitamin 1 Tablet(s) Oral daily  nicotine -  14 mG/24Hr(s) Patch 1 patch Transdermal daily  nortriptyline 25 milliGRAM(s) Oral at bedtime  nystatin Powder 1 Application(s) Topical two times a day  oxyCODONE  ER Tablet 20 milliGRAM(s) Oral every 12 hours  pantoprazole    Tablet 40 milliGRAM(s) Oral before breakfast  tiZANidine 4 milliGRAM(s) Oral every 6 hours PRN      Vital Signs Last 24 Hrs  T(C): 36.7 (03 Sep 2019 06:55), Max: 37 (02 Sep 2019 18:21)  T(F): 98.1 (03 Sep 2019 06:55), Max: 98.6 (02 Sep 2019 18:21)  HR: 69 (03 Sep 2019 06:55) (69 - 72)  BP: 147/98 (03 Sep 2019 06:55) (124/85 - 152/99)  BP(mean): --  RR: 16 (03 Sep 2019 06:55) (16 - 18)  SpO2: 100% (03 Sep 2019 06:55) (99% - 100%)    Physical Exam:  General:    NAD, non toxic  Head: atraumatic, normocephalic  Eyes: normal sclera and conjunctiva  ENT:   no oropharyngeal lesions, no LAD, neck supple  Cardio:    regular S1,S2  Respiratory:   clear b/l, no wheezing  abd:   soft, BS +, not tender, no hepatosplenomegaly  :     no CVAT, no suprapubic tenderness, no romero  Musculoskeletal : RLE scar from ORIF but no tenderness  Skin:    no rash  vascular: no phlebitis, normal pulses  Neurologic:     no focal deficits  psych: normal affect                  MICROBIOLOGY:  v  BLOOD PERIPHERAL  08-12-19 --  --  --      BLOOD VENOUS  08-10-19 --  --  --      BLOOD  08-08-19 --  --  --      BLOOD  08-07-19 --  --  Staphylococcus aureus  BLOOD CULTURE PCR                RADIOLOGY:  Images below reviewed personally  < from: MR Lumbar Spine w/wo IV Cont (08.19.19 @ 11:13) >    IMPRESSION: Redemonstrated abnormal signal involving the L3 vertebral   body with associated enhancement and edema which has gradually progressed   from the prior exams. There is a small amount of prevertebral soft tissue   swelling/enhancement. Findings favor evolving osteomyelitis.    No evidence of epidural abscess.

## 2019-09-03 NOTE — PROGRESS NOTE ADULT - SUBJECTIVE AND OBJECTIVE BOX
LUZ BUCKLEY:[id number],   54yMale followed for:  No Known Allergies    PAST MEDICAL & SURGICAL HISTORY:  Sciatica  S/P ORIF (open reduction internal fixation) fracture    FAMILY HISTORY:    MEDICATIONS  (STANDING):  enalapril 20 milliGRAM(s) Oral every 12 hours  gabapentin 800 milliGRAM(s) Oral three times a day  hydrALAZINE 25 milliGRAM(s) Oral three times a day  lidocaine   Patch 1 Patch Transdermal daily  melatonin 6 milliGRAM(s) Oral at bedtime  multivitamin      multivitamin 1 Tablet(s) Oral daily  nafcillin  IVPB      nafcillin  IVPB 2 Gram(s) IV Intermittent every 4 hours  nicotine -  14 mG/24Hr(s) Patch 1 patch Transdermal daily  nortriptyline 25 milliGRAM(s) Oral at bedtime  nystatin Powder 1 Application(s) Topical two times a day  oxyCODONE  ER Tablet 20 milliGRAM(s) Oral every 12 hours  pantoprazole    Tablet 40 milliGRAM(s) Oral before breakfast    MEDICATIONS  (PRN):  cloNIDine 0.1 milliGRAM(s) Oral two times a day PRN withdrawl symptoms  hydrOXYzine hydrochloride 50 milliGRAM(s) Oral every 8 hours PRN Anxiety  tiZANidine 4 milliGRAM(s) Oral every 6 hours PRN Muscle Spasm      Vital Signs Last 24 Hrs  T(C): 36.7 (03 Sep 2019 06:55), Max: 37 (02 Sep 2019 18:21)  T(F): 98.1 (03 Sep 2019 06:55), Max: 98.6 (02 Sep 2019 18:21)  HR: 69 (03 Sep 2019 06:55) (69 - 72)  BP: 147/98 (03 Sep 2019 06:55) (124/85 - 152/99)  BP(mean): --  RR: 16 (03 Sep 2019 06:55) (16 - 18)  SpO2: 100% (03 Sep 2019 06:55) (99% - 100%)  nc/at  s1s2  cta  soft, nt, nd no guarding or rebound  no c/c/e

## 2019-09-04 LAB
ALBUMIN SERPL ELPH-MCNC: 3.7 G/DL — SIGNIFICANT CHANGE UP (ref 3.3–5)
ALBUMIN SERPL ELPH-MCNC: 3.7 G/DL — SIGNIFICANT CHANGE UP (ref 3.3–5)
ALP SERPL-CCNC: 58 U/L — SIGNIFICANT CHANGE UP (ref 40–120)
ALP SERPL-CCNC: 58 U/L — SIGNIFICANT CHANGE UP (ref 40–120)
ALT FLD-CCNC: 27 U/L — SIGNIFICANT CHANGE UP (ref 4–41)
ALT FLD-CCNC: 27 U/L — SIGNIFICANT CHANGE UP (ref 4–41)
ANION GAP SERPL CALC-SCNC: 12 MMO/L — SIGNIFICANT CHANGE UP (ref 7–14)
ANION GAP SERPL CALC-SCNC: 13 MMO/L — SIGNIFICANT CHANGE UP (ref 7–14)
ANION GAP SERPL CALC-SCNC: 13 MMO/L — SIGNIFICANT CHANGE UP (ref 7–14)
AST SERPL-CCNC: 41 U/L — HIGH (ref 4–40)
AST SERPL-CCNC: 41 U/L — HIGH (ref 4–40)
BASOPHILS # BLD AUTO: 0.05 K/UL — SIGNIFICANT CHANGE UP (ref 0–0.2)
BASOPHILS NFR BLD AUTO: 0.9 % — SIGNIFICANT CHANGE UP (ref 0–2)
BILIRUB SERPL-MCNC: 0.9 MG/DL — SIGNIFICANT CHANGE UP (ref 0.2–1.2)
BILIRUB SERPL-MCNC: 0.9 MG/DL — SIGNIFICANT CHANGE UP (ref 0.2–1.2)
BUN SERPL-MCNC: 27 MG/DL — HIGH (ref 7–23)
BUN SERPL-MCNC: 27 MG/DL — HIGH (ref 7–23)
BUN SERPL-MCNC: 28 MG/DL — HIGH (ref 7–23)
CALCIUM SERPL-MCNC: 9.5 MG/DL — SIGNIFICANT CHANGE UP (ref 8.4–10.5)
CALCIUM SERPL-MCNC: 9.5 MG/DL — SIGNIFICANT CHANGE UP (ref 8.4–10.5)
CALCIUM SERPL-MCNC: 9.8 MG/DL — SIGNIFICANT CHANGE UP (ref 8.4–10.5)
CHLORIDE SERPL-SCNC: 105 MMOL/L — SIGNIFICANT CHANGE UP (ref 98–107)
CHLORIDE SERPL-SCNC: 106 MMOL/L — SIGNIFICANT CHANGE UP (ref 98–107)
CHLORIDE SERPL-SCNC: 106 MMOL/L — SIGNIFICANT CHANGE UP (ref 98–107)
CO2 SERPL-SCNC: 22 MMOL/L — SIGNIFICANT CHANGE UP (ref 22–31)
CO2 SERPL-SCNC: 22 MMOL/L — SIGNIFICANT CHANGE UP (ref 22–31)
CO2 SERPL-SCNC: 23 MMOL/L — SIGNIFICANT CHANGE UP (ref 22–31)
CREAT SERPL-MCNC: 0.95 MG/DL — SIGNIFICANT CHANGE UP (ref 0.5–1.3)
CREAT SERPL-MCNC: 0.95 MG/DL — SIGNIFICANT CHANGE UP (ref 0.5–1.3)
CREAT SERPL-MCNC: 1 MG/DL — SIGNIFICANT CHANGE UP (ref 0.5–1.3)
CRP SERPL HS-MCNC: 1.73 MG/L — SIGNIFICANT CHANGE UP
CRP SERPL-MCNC: < 4 MG/L — SIGNIFICANT CHANGE UP
EOSINOPHIL # BLD AUTO: 0.21 K/UL — SIGNIFICANT CHANGE UP (ref 0–0.5)
EOSINOPHIL NFR BLD AUTO: 4 % — SIGNIFICANT CHANGE UP (ref 0–6)
ERYTHROCYTE [SEDIMENTATION RATE] IN BLOOD: 25 MM/HR — HIGH (ref 1–15)
GLUCOSE SERPL-MCNC: 126 MG/DL — HIGH (ref 70–99)
GLUCOSE SERPL-MCNC: 126 MG/DL — HIGH (ref 70–99)
GLUCOSE SERPL-MCNC: 97 MG/DL — SIGNIFICANT CHANGE UP (ref 70–99)
HCT VFR BLD CALC: 43.7 % — SIGNIFICANT CHANGE UP (ref 39–50)
HGB BLD-MCNC: 13.9 G/DL — SIGNIFICANT CHANGE UP (ref 13–17)
IMM GRANULOCYTES NFR BLD AUTO: 0.8 % — SIGNIFICANT CHANGE UP (ref 0–1.5)
LYMPHOCYTES # BLD AUTO: 2.17 K/UL — SIGNIFICANT CHANGE UP (ref 1–3.3)
LYMPHOCYTES # BLD AUTO: 40.9 % — SIGNIFICANT CHANGE UP (ref 13–44)
MAGNESIUM SERPL-MCNC: 2 MG/DL — SIGNIFICANT CHANGE UP (ref 1.6–2.6)
MCHC RBC-ENTMCNC: 29.4 PG — SIGNIFICANT CHANGE UP (ref 27–34)
MCHC RBC-ENTMCNC: 31.8 % — LOW (ref 32–36)
MCV RBC AUTO: 92.4 FL — SIGNIFICANT CHANGE UP (ref 80–100)
MONOCYTES # BLD AUTO: 0.54 K/UL — SIGNIFICANT CHANGE UP (ref 0–0.9)
MONOCYTES NFR BLD AUTO: 10.2 % — SIGNIFICANT CHANGE UP (ref 2–14)
NEUTROPHILS # BLD AUTO: 2.29 K/UL — SIGNIFICANT CHANGE UP (ref 1.8–7.4)
NEUTROPHILS NFR BLD AUTO: 43.2 % — SIGNIFICANT CHANGE UP (ref 43–77)
NRBC # FLD: 0 K/UL — SIGNIFICANT CHANGE UP (ref 0–0)
PHOSPHATE SERPL-MCNC: 3.2 MG/DL — SIGNIFICANT CHANGE UP (ref 2.5–4.5)
PLATELET # BLD AUTO: 301 K/UL — SIGNIFICANT CHANGE UP (ref 150–400)
PMV BLD: 10.5 FL — SIGNIFICANT CHANGE UP (ref 7–13)
POTASSIUM SERPL-MCNC: 4.6 MMOL/L — SIGNIFICANT CHANGE UP (ref 3.5–5.3)
POTASSIUM SERPL-MCNC: 5.6 MMOL/L — HIGH (ref 3.5–5.3)
POTASSIUM SERPL-MCNC: 5.6 MMOL/L — HIGH (ref 3.5–5.3)
POTASSIUM SERPL-SCNC: 4.6 MMOL/L — SIGNIFICANT CHANGE UP (ref 3.5–5.3)
POTASSIUM SERPL-SCNC: 5.6 MMOL/L — HIGH (ref 3.5–5.3)
POTASSIUM SERPL-SCNC: 5.6 MMOL/L — HIGH (ref 3.5–5.3)
PROT SERPL-MCNC: 7.5 G/DL — SIGNIFICANT CHANGE UP (ref 6–8.3)
PROT SERPL-MCNC: 7.5 G/DL — SIGNIFICANT CHANGE UP (ref 6–8.3)
RBC # BLD: 4.73 M/UL — SIGNIFICANT CHANGE UP (ref 4.2–5.8)
RBC # FLD: 17.2 % — HIGH (ref 10.3–14.5)
SODIUM SERPL-SCNC: 140 MMOL/L — SIGNIFICANT CHANGE UP (ref 135–145)
SODIUM SERPL-SCNC: 141 MMOL/L — SIGNIFICANT CHANGE UP (ref 135–145)
SODIUM SERPL-SCNC: 141 MMOL/L — SIGNIFICANT CHANGE UP (ref 135–145)
WBC # BLD: 5.3 K/UL — SIGNIFICANT CHANGE UP (ref 3.8–10.5)
WBC # FLD AUTO: 5.3 K/UL — SIGNIFICANT CHANGE UP (ref 3.8–10.5)

## 2019-09-04 PROCEDURE — 99232 SBSQ HOSP IP/OBS MODERATE 35: CPT

## 2019-09-04 RX ORDER — NALOXONE HYDROCHLORIDE 4 MG/.1ML
0.4 SPRAY NASAL ONCE
Refills: 0 | Status: COMPLETED | OUTPATIENT
Start: 2019-09-04 | End: 2019-09-04

## 2019-09-04 RX ORDER — LIDOCAINE 4 G/100G
1 CREAM TOPICAL DAILY
Refills: 0 | Status: DISCONTINUED | OUTPATIENT
Start: 2019-09-04 | End: 2019-09-06

## 2019-09-04 RX ADMIN — OXYCODONE AND ACETAMINOPHEN 2 TABLET(S): 5; 325 TABLET ORAL at 08:55

## 2019-09-04 RX ADMIN — Medication 6 MILLIGRAM(S): at 21:45

## 2019-09-04 RX ADMIN — Medication 20 MILLIGRAM(S): at 05:38

## 2019-09-04 RX ADMIN — NAFCILLIN 200 GRAM(S): 10 INJECTION, POWDER, FOR SOLUTION INTRAVENOUS at 00:06

## 2019-09-04 RX ADMIN — NAFCILLIN 200 GRAM(S): 10 INJECTION, POWDER, FOR SOLUTION INTRAVENOUS at 18:55

## 2019-09-04 RX ADMIN — OXYCODONE AND ACETAMINOPHEN 2 TABLET(S): 5; 325 TABLET ORAL at 09:55

## 2019-09-04 RX ADMIN — OXYCODONE HYDROCHLORIDE 20 MILLIGRAM(S): 5 TABLET ORAL at 01:06

## 2019-09-04 RX ADMIN — OXYCODONE AND ACETAMINOPHEN 2 TABLET(S): 5; 325 TABLET ORAL at 03:56

## 2019-09-04 RX ADMIN — PANTOPRAZOLE SODIUM 40 MILLIGRAM(S): 20 TABLET, DELAYED RELEASE ORAL at 05:35

## 2019-09-04 RX ADMIN — Medication 1 PATCH: at 18:21

## 2019-09-04 RX ADMIN — TIZANIDINE 4 MILLIGRAM(S): 4 TABLET ORAL at 18:54

## 2019-09-04 RX ADMIN — GABAPENTIN 800 MILLIGRAM(S): 400 CAPSULE ORAL at 05:34

## 2019-09-04 RX ADMIN — NAFCILLIN 200 GRAM(S): 10 INJECTION, POWDER, FOR SOLUTION INTRAVENOUS at 21:03

## 2019-09-04 RX ADMIN — NYSTATIN CREAM 1 APPLICATION(S): 100000 CREAM TOPICAL at 05:33

## 2019-09-04 RX ADMIN — GABAPENTIN 800 MILLIGRAM(S): 400 CAPSULE ORAL at 14:56

## 2019-09-04 RX ADMIN — OXYCODONE AND ACETAMINOPHEN 2 TABLET(S): 5; 325 TABLET ORAL at 21:01

## 2019-09-04 RX ADMIN — OXYCODONE HYDROCHLORIDE 20 MILLIGRAM(S): 5 TABLET ORAL at 12:02

## 2019-09-04 RX ADMIN — Medication 1 PATCH: at 21:06

## 2019-09-04 RX ADMIN — NALOXONE HYDROCHLORIDE 0.4 MILLIGRAM(S): 4 SPRAY NASAL at 20:12

## 2019-09-04 RX ADMIN — OXYCODONE AND ACETAMINOPHEN 2 TABLET(S): 5; 325 TABLET ORAL at 15:56

## 2019-09-04 RX ADMIN — OXYCODONE AND ACETAMINOPHEN 2 TABLET(S): 5; 325 TABLET ORAL at 14:56

## 2019-09-04 RX ADMIN — GABAPENTIN 800 MILLIGRAM(S): 400 CAPSULE ORAL at 21:03

## 2019-09-04 RX ADMIN — NAFCILLIN 200 GRAM(S): 10 INJECTION, POWDER, FOR SOLUTION INTRAVENOUS at 09:20

## 2019-09-04 RX ADMIN — OXYCODONE AND ACETAMINOPHEN 2 TABLET(S): 5; 325 TABLET ORAL at 02:55

## 2019-09-04 RX ADMIN — Medication 25 MILLIGRAM(S): at 14:56

## 2019-09-04 RX ADMIN — Medication 1 TABLET(S): at 12:02

## 2019-09-04 RX ADMIN — OXYCODONE AND ACETAMINOPHEN 2 TABLET(S): 5; 325 TABLET ORAL at 21:59

## 2019-09-04 RX ADMIN — Medication 1 PATCH: at 21:10

## 2019-09-04 RX ADMIN — Medication 25 MILLIGRAM(S): at 05:38

## 2019-09-04 RX ADMIN — Medication 20 MILLIGRAM(S): at 18:54

## 2019-09-04 RX ADMIN — NAFCILLIN 200 GRAM(S): 10 INJECTION, POWDER, FOR SOLUTION INTRAVENOUS at 05:33

## 2019-09-04 RX ADMIN — OXYCODONE HYDROCHLORIDE 20 MILLIGRAM(S): 5 TABLET ORAL at 00:06

## 2019-09-04 RX ADMIN — TIZANIDINE 4 MILLIGRAM(S): 4 TABLET ORAL at 05:34

## 2019-09-04 RX ADMIN — Medication 1 PATCH: at 07:00

## 2019-09-04 NOTE — PROGRESS NOTE ADULT - SUBJECTIVE AND OBJECTIVE BOX
Follow Up:  MSSA bacteremia    Interval History: pt stable and afebrile, had IV access issues    ROS:      All other systems negative    Constitutional: no fever, no chills, no weight loss, no night sweats  Eye: no eye pain, no redness, no vision changes  ENT:  no sore throat, no rhinorrhea  Cardiovascular:  no chest pain, no palpitation  Respiratory:  no SOB, no cough  GI:  no abd pain, no vomiting, no diarrhea  urinary: no dysuria, no hematuria, no flank pain  : no penile discharge or bleeding  musculoskeletal:   back pain not controlled  skin:  no rash  neurology:  no headache, no seizure, no change in mental status        Allergies  No Known Allergies        ANTIMICROBIALS:  nafcillin  IVPB    nafcillin  IVPB 2 every 4 hours      OTHER MEDS:  cloNIDine 0.1 milliGRAM(s) Oral two times a day PRN  enalapril 20 milliGRAM(s) Oral every 12 hours  gabapentin 800 milliGRAM(s) Oral three times a day  hydrALAZINE 25 milliGRAM(s) Oral three times a day  hydrOXYzine hydrochloride 50 milliGRAM(s) Oral every 8 hours PRN  lidocaine   Patch 1 Patch Transdermal daily  melatonin 6 milliGRAM(s) Oral at bedtime  multivitamin      multivitamin 1 Tablet(s) Oral daily  nicotine -  14 mG/24Hr(s) Patch 1 patch Transdermal daily  nortriptyline 25 milliGRAM(s) Oral at bedtime  nystatin Powder 1 Application(s) Topical two times a day  oxyCODONE    5 mG/acetaminophen 325 mG 2 Tablet(s) Oral every 6 hours PRN  oxyCODONE  ER Tablet 20 milliGRAM(s) Oral every 12 hours  pantoprazole    Tablet 40 milliGRAM(s) Oral before breakfast  tiZANidine 4 milliGRAM(s) Oral every 6 hours PRN      Vital Signs Last 24 Hrs  T(C): 36.7 (04 Sep 2019 05:35), Max: 36.7 (03 Sep 2019 14:48)  T(F): 98.1 (04 Sep 2019 05:35), Max: 98.1 (04 Sep 2019 05:35)  HR: 75 (04 Sep 2019 05:35) (66 - 75)  BP: 141/91 (04 Sep 2019 05:35) (135/89 - 142/90)  BP(mean): --  RR: 18 (04 Sep 2019 05:35) (18 - 18)  SpO2: 100% (04 Sep 2019 05:35) (100% - 100%)    Physical Exam:  General:    NAD, non toxic  Head: atraumatic, normocephalic  Eyes: normal sclera and conjunctiva  ENT:   no oropharyngeal lesions, no LAD, neck supple  Cardio:    regular S1,S2  Respiratory:   clear b/l, no wheezing  abd:   soft, BS +, not tender, no hepatosplenomegaly  :     no CVAT, no suprapubic tenderness, no romero  Musculoskeletal : RLE scar from ORIF but no tenderness  Skin:    no rash  vascular: no phlebitis, normal pulses  Neurologic:     no focal deficits  psych: normal affect                            13.9   5.30  )-----------( 301      ( 04 Sep 2019 06:20 )             43.7       09-04    141  |  106  |  27<H>  ----------------------------<  126<H>  5.6<H>   |  22  |  0.95    Ca    9.5      04 Sep 2019 06:20  Phos  3.2     09-04  Mg     2.0     09-04    TPro  7.5  /  Alb  3.7  /  TBili  0.9  /  DBili  x   /  AST  41<H>  /  ALT  27  /  AlkPhos  58  09-04          MICROBIOLOGY:  v  BLOOD PERIPHERAL  08-12-19 --  --  --      BLOOD VENOUS  08-10-19 --  --  --      BLOOD  08-08-19 --  --  --      BLOOD  08-07-19 --  --  Staphylococcus aureus  BLOOD CULTURE PCR                RADIOLOGY:  Images below reviewed personally  < from: MR Lumbar Spine w/wo IV Cont (08.19.19 @ 11:13) >  IMPRESSION: Redemonstrated abnormal signal involving the L3 vertebral   body with associated enhancement and edema which has gradually progressed   from the prior exams. There is a small amount of prevertebral soft tissue   swelling/enhancement. Findings favor evolving osteomyelitis.    No evidence of epidural abscess.

## 2019-09-04 NOTE — PROGRESS NOTE ADULT - ASSESSMENT
55 yo male active heroin use with chronic back pain, kidney cyst a/w acute on chronic back pain with sciatica with difficulty ambulating due to pain 8/4. patient found to have s. aureus bacteremia on cefazolin iv. nephrology consulted for ARNOLDO.     ARNOLDO  peaked 1.79   ARNOLDO likely prerenal on NSAIDs and lisinopril  renal function improved with IVF  off ibuprofen.   renal us done showed left renal cyst  Pt currently on enalapril 20 bid  now off ketorolac iv for pain (8/26/19)   off IVF  renal function stable (no new lab)  monitor bmp, urine output     HTN  Suboptimal  Can add a CCB if needed.   monitor bmp and bp    Hep C  f/u GI  outpatient treatment    Proteinuria  mild  p/c ratio <300, Monitor  hep c +

## 2019-09-04 NOTE — PROGRESS NOTE ADULT - SUBJECTIVE AND OBJECTIVE BOX
LUZ BUCKLEY:[id number],   54yMale followed for:  No Known Allergies    PAST MEDICAL & SURGICAL HISTORY:  Sciatica  S/P ORIF (open reduction internal fixation) fracture    FAMILY HISTORY:    MEDICATIONS  (STANDING):  enalapril 20 milliGRAM(s) Oral every 12 hours  gabapentin 800 milliGRAM(s) Oral three times a day  hydrALAZINE 25 milliGRAM(s) Oral three times a day  lidocaine   Patch 1 Patch Transdermal daily  melatonin 6 milliGRAM(s) Oral at bedtime  multivitamin      multivitamin 1 Tablet(s) Oral daily  nafcillin  IVPB      nafcillin  IVPB 2 Gram(s) IV Intermittent every 4 hours  nicotine -  14 mG/24Hr(s) Patch 1 patch Transdermal daily  nortriptyline 25 milliGRAM(s) Oral at bedtime  nystatin Powder 1 Application(s) Topical two times a day  oxyCODONE  ER Tablet 20 milliGRAM(s) Oral every 12 hours  pantoprazole    Tablet 40 milliGRAM(s) Oral before breakfast    MEDICATIONS  (PRN):  cloNIDine 0.1 milliGRAM(s) Oral two times a day PRN withdrawl symptoms  hydrOXYzine hydrochloride 50 milliGRAM(s) Oral every 8 hours PRN Anxiety  oxyCODONE    5 mG/acetaminophen 325 mG 2 Tablet(s) Oral every 6 hours PRN Severe Pain (7 - 10)  tiZANidine 4 milliGRAM(s) Oral every 6 hours PRN Muscle Spasm      Vital Signs Last 24 Hrs  T(C): 36.7 (04 Sep 2019 05:35), Max: 36.7 (03 Sep 2019 14:48)  T(F): 98.1 (04 Sep 2019 05:35), Max: 98.1 (04 Sep 2019 05:35)  HR: 75 (04 Sep 2019 05:35) (66 - 75)  BP: 141/91 (04 Sep 2019 05:35) (135/89 - 142/90)  BP(mean): --  RR: 18 (04 Sep 2019 05:35) (18 - 18)  SpO2: 100% (04 Sep 2019 05:35) (100% - 100%)  nc/at  s1s2  cta  soft, nt, nd no guarding or rebound  no c/c/e    CBC Full  -  ( 04 Sep 2019 06:20 )  WBC Count : 5.30 K/uL  RBC Count : 4.73 M/uL  Hemoglobin : 13.9 g/dL  Hematocrit : 43.7 %  Platelet Count - Automated : 301 K/uL  Mean Cell Volume : 92.4 fL  Mean Cell Hemoglobin : 29.4 pg  Mean Cell Hemoglobin Concentration : 31.8 %  Auto Neutrophil # : 2.29 K/uL  Auto Lymphocyte # : 2.17 K/uL  Auto Monocyte # : 0.54 K/uL  Auto Eosinophil # : 0.21 K/uL  Auto Basophil # : 0.05 K/uL  Auto Neutrophil % : 43.2 %  Auto Lymphocyte % : 40.9 %  Auto Monocyte % : 10.2 %  Auto Eosinophil % : 4.0 %  Auto Basophil % : 0.9 %    09-04    141  |  106  |  27<H>  ----------------------------<  126<H>  5.6<H>   |  22  |  0.95    Ca    9.5      04 Sep 2019 06:20  Phos  3.2     09-04  Mg     2.0     09-04    TPro  7.5  /  Alb  3.7  /  TBili  0.9  /  DBili  x   /  AST  41<H>  /  ALT  27  /  AlkPhos  58  09-04

## 2019-09-04 NOTE — PROGRESS NOTE ADULT - SUBJECTIVE AND OBJECTIVE BOX
MARCIO LUZ  54y  Male      Patient is a 54y old  Male who presents with a chief complaint of back pain and sciatica (04 Sep 2019 16:01)  Above noted.Patient had visitor,was in bathroom for an hour,came out lethargic.s/p Narcan-improved clinically.  alert,awake,talking,verbally abusive.nad  no sob,no cp,no fever,Back  pain is controlled with current meds  REVIEW OF SYSTEMS:  as above.    INTERVAL HPI/OVERNIGHT EVENTS:  T(C): 36.6 (09-04-19 @ 14:20), Max: 36.7 (09-04-19 @ 02:56)  HR: 77 (09-04-19 @ 18:52) (66 - 78)  BP: 138/90 (09-04-19 @ 18:52) (138/90 - 153/104)  RR: 14 (09-04-19 @ 18:52) (14 - 20)  SpO2: 97% (09-04-19 @ 18:52) (97% - 100%)  Wt(kg): --  I&O's Summary    T(C): 36.6 (09-04-19 @ 14:20), Max: 36.7 (09-04-19 @ 02:56)  HR: 77 (09-04-19 @ 18:52) (66 - 78)  BP: 138/90 (09-04-19 @ 18:52) (138/90 - 153/104)  RR: 14 (09-04-19 @ 18:52) (14 - 20)  SpO2: 97% (09-04-19 @ 18:52) (97% - 100%)  Wt(kg): --Vital Signs Last 24 Hrs  T(C): 36.6 (04 Sep 2019 14:20), Max: 36.7 (04 Sep 2019 02:56)  T(F): 97.9 (04 Sep 2019 14:20), Max: 98.1 (04 Sep 2019 05:35)  HR: 77 (04 Sep 2019 18:52) (66 - 78)  BP: 138/90 (04 Sep 2019 18:52) (138/90 - 153/104)  BP(mean): --  RR: 14 (04 Sep 2019 18:52) (14 - 20)  SpO2: 97% (04 Sep 2019 18:52) (97% - 100%)    LABS:                        13.9   5.30  )-----------( 301      ( 04 Sep 2019 06:20 )             43.7     09-04    140  |  105  |  28<H>  ----------------------------<  97  4.6   |  23  |  1.00    Ca    9.8      04 Sep 2019 12:46  Phos  3.2     09-04  Mg     2.0     09-04    TPro  7.5  /  Alb  3.7  /  TBili  0.9  /  DBili  x   /  AST  41<H>  /  ALT  27  /  AlkPhos  58  09-04        CAPILLARY BLOOD GLUCOSE                PAST MEDICAL & SURGICAL HISTORY:  Sciatica  S/P ORIF (open reduction internal fixation) fracture      MEDICATIONS  (STANDING):  enalapril 20 milliGRAM(s) Oral every 12 hours  gabapentin 800 milliGRAM(s) Oral three times a day  hydrALAZINE 25 milliGRAM(s) Oral three times a day  lidocaine   Patch 1 Patch Transdermal daily  lidocaine   Patch 1 Patch Transdermal daily  melatonin 6 milliGRAM(s) Oral at bedtime  multivitamin      multivitamin 1 Tablet(s) Oral daily  nafcillin  IVPB      nafcillin  IVPB 2 Gram(s) IV Intermittent every 4 hours  nicotine -  14 mG/24Hr(s) Patch 1 patch Transdermal daily  nortriptyline 25 milliGRAM(s) Oral at bedtime  nystatin Powder 1 Application(s) Topical two times a day  oxyCODONE  ER Tablet 20 milliGRAM(s) Oral every 12 hours  pantoprazole    Tablet 40 milliGRAM(s) Oral before breakfast    MEDICATIONS  (PRN):  cloNIDine 0.1 milliGRAM(s) Oral two times a day PRN withdrawl symptoms  hydrOXYzine hydrochloride 50 milliGRAM(s) Oral every 8 hours PRN Anxiety  oxyCODONE    5 mG/acetaminophen 325 mG 2 Tablet(s) Oral every 6 hours PRN Severe Pain (7 - 10)  tiZANidine 4 milliGRAM(s) Oral every 6 hours PRN Muscle Spasm        RADIOLOGY & ADDITIONAL TESTS:    Imaging Personally Reviewed:  [ ] YES  [ ] NO    Consultant(s) Notes Reviewed:  x[ ] YES  [ ] NO    PHYSICAL EXAM:  GENERAL: NAD, well-groomed, well-developed  HEAD:  Atraumatic, Normocephalic  EYES: EOMI, PERRLA, conjunctiva and sclera clear  ENMT: No tonsillar erythema, exudates, or enlargement; Moist mucous membranes, Good dentition, No lesions  NECK: Supple, No JVD, Normal thyroid  NERVOUS SYSTEM:  Alert & Oriented X3, Good concentration; Motor Strength 5/5 B/L upper and lower extremities; DTRs 2+ intact and symmetric  CHEST/LUNG: Clear to percussion bilaterally; No rales, rhonchi, wheezing, or rubs  HEART: Regular rate and rhythm; No murmurs, rubs, or gallops  ABDOMEN: Soft, Nontender, Nondistended; Bowel sounds present  EXTREMITIES:  2+ Peripheral Pulses, No clubbing, cyanosis, or edema Back -mild tenderness  LYMPH: No lymphadenopathy noted  SKIN: No rashes or lesions    Care Discussed with Consultants/Other Providers [x ] YES  [ ] NO      Code Status: [] Full Code [] DNR [] DNI [] Goals of Care:   Disposition: [] ICU [] Stroke Unit [] RCU []PCU []Floor [] Discharge Home         JEANINE RodriguezP

## 2019-09-04 NOTE — PROGRESS NOTE ADULT - ASSESSMENT
54 m with IVDA, hepatitis C (not treated), RLE ORIF and hardware, previous bacteremia and lumbar osteo? 2004, presented with worsening back pain, with difficulty ambulating due to pain.  here afebrile normal WBC  blood cx: 2/2 MSSA  hep C viral load: [id number]  spine MRI: enhancing lesion on L3 s/o occult fx or a lesion but CT negative for FX or lesion  hiv negative    MSSA bacteremia and L3 osteo   active heroine use  repeat cx 8/12 negative   TTE negative  repeat MRI 8/19 with progressed enhancement and edema of L3 with perivertebral soft tissue swelling/enhancement, s/o evolving osteo, no epidural abscess    * ESR and CRP improving   * c/w Nafcillin 2 q 4  * can place a PICC line but pt can not be discharged with a line in view of active IVDA  * will have continue the course here or another rehab facility  * will do an 8 week course for L3 osteo until 10/7  * f/u with pain management  * will need weekly CBC, CMP, ESR and CRP

## 2019-09-04 NOTE — PROGRESS NOTE ADULT - SUBJECTIVE AND OBJECTIVE BOX
Chief Complaint:    HPI:  Pt is a 53 yo male with chronic back pain, kidney cyst a/w acute on chronic back pain with sciatica with difficulty ambulating due to pain.  Pt reports a similar episode approx 15 years ago.  Upon further questioning about that epiosde, pt states he was told it was due to the deposition of the impurities of the heroin he used.  States they put a needle in his back, unclear if he had abx during that time.  Pt states he is still using heroin every 3-4 days, about 4 bags at a time.  Reports he is also feeling a little "sick" and has not eaten in 4 days due to using and his pain; currently is hungry.  Pt states he had a neg HIV test about 3 months ago and states he uses clean needles every time he injects.    Pt denies fever, chills, incontinence bowel or bladder (04 Aug 2019 09:08)      PAST MEDICAL & SURGICAL HISTORY:  Sciatica  S/P ORIF (open reduction internal fixation) fracture      FAMILY HISTORY:      SOCIAL HISTORY:  [ ] Denies Smoking, Alcohol, or Drug Use    Allergies    No Known Allergies    Intolerances        PAIN MEDICATIONS:  gabapentin 800 milliGRAM(s) Oral three times a day  hydrOXYzine hydrochloride 50 milliGRAM(s) Oral every 8 hours PRN  melatonin 6 milliGRAM(s) Oral at bedtime  nortriptyline 25 milliGRAM(s) Oral at bedtime  oxyCODONE    5 mG/acetaminophen 325 mG 2 Tablet(s) Oral every 6 hours PRN  oxyCODONE  ER Tablet 20 milliGRAM(s) Oral every 12 hours  tiZANidine 4 milliGRAM(s) Oral every 6 hours PRN      Heme:    Antibiotics:  nafcillin  IVPB      nafcillin  IVPB 2 Gram(s) IV Intermittent every 4 hours    Cardiovascular:  cloNIDine 0.1 milliGRAM(s) Oral two times a day PRN  enalapril 20 milliGRAM(s) Oral every 12 hours  hydrALAZINE 25 milliGRAM(s) Oral three times a day    GI:  pantoprazole    Tablet 40 milliGRAM(s) Oral before breakfast    Endocrine:    All Other Medications:  lidocaine   Patch 1 Patch Transdermal daily  multivitamin      multivitamin 1 Tablet(s) Oral daily  nicotine -  14 mG/24Hr(s) Patch 1 patch Transdermal daily  nystatin Powder 1 Application(s) Topical two times a day      REVIEW OF SYSTEMS:    CONSTITUTIONAL: No fever, weight loss, or fatigue  EYES: No eye pain, visual disturbances, or discharge  ENMT:  No difficulty hearing, tinnitus, vertigo; No sinus or throat pain  NECK: No pain or stiffness  BREASTS: No pain, masses, or nipple discharge  RESPIRATORY: No cough, wheezing, chills or hemoptysis; No shortness of breath  CARDIOVASCULAR: No chest pain, palpitations, dizziness, or leg swelling  GASTROINTESTINAL: No abdominal or epigastric pain. No nausea, vomiting, or hematemesis; No diarrhea or constipation. No melena or hematochezia.  GENITOURINARY: No dysuria, frequency, hematuria, or incontinence  NEUROLOGICAL: No headaches, memory loss, loss of strength, numbness, or tremors  SKIN: No itching, burning, rashes, or lesions   LYMPH NODES: No enlarged glands  ENDOCRINE: No heat or cold intolerance; No hair loss  MUSCULOSKELETAL: No joint pain or swelling; No muscle, back, or extremity pain  PSYCHIATRIC: No depression, anxiety, mood swings, or difficulty sleeping  HEME/LYMPH: No easy bruising, or bleeding gums  ALLERY AND IMMUNOLOGIC: No hives or eczema      Vital Signs Last 24 Hrs  T(C): 36.6 (04 Sep 2019 14:20), Max: 36.7 (04 Sep 2019 02:56)  T(F): 97.9 (04 Sep 2019 14:20), Max: 98.1 (04 Sep 2019 05:35)  HR: 78 (04 Sep 2019 14:20) (66 - 78)  BP: 153/104 (04 Sep 2019 14:20) (141/91 - 153/104)  BP(mean): --  RR: 20 (04 Sep 2019 14:20) (18 - 20)  SpO2: 99% (04 Sep 2019 14:20) (99% - 100%)    PAIN SCORE:         SCALE USED: (1-10 VNRS)             PHYSICAL EXAM:    GENERAL: NAD, well-groomed, well-developed  HEAD:  Atraumatic, Normocephalic  EYES: EOMI, PERRLA, conjunctiva and sclera clear  ENMT: No tonsillar erythema, exudates, or enlargement; Moist mucous membranes, Good dentition, No lesions  NECK: Supple, No JVD, Normal thyroid  NERVOUS SYSTEM:  Alert & Oriented X3, Good concentration; Motor Strength 5/5 B/L upper and lower extremities; DTRs 2+ intact and symmetric  CHEST/LUNG: Clear to percussion bilaterally; No rales, rhonchi, wheezing, or rubs  HEART: Regular rate and rhythm; No murmurs, rubs, or gallops  ABDOMEN: Soft, Nontender, Nondistended; Bowel sounds present  EXTREMITIES:  2+ Peripheral Pulses, No clubbing, cyanosis, or edema  LYMPH: No lymphadenopathy noted  SKIN: No rashes or lesions        LABS:                          13.9   5.30  )-----------( 301      ( 04 Sep 2019 06:20 )             43.7     09-04    140  |  105  |  28<H>  ----------------------------<  97  4.6   |  23  |  1.00    Ca    9.8      04 Sep 2019 12:46  Phos  3.2     09-04  Mg     2.0     09-04    TPro  7.5  /  Alb  3.7  /  TBili  0.9  /  DBili  x   /  AST  41<H>  /  ALT  27  /  AlkPhos  58  09-04          Comments: Pt. complaining of pain 10/10 in his lower back. Pt. states pain was much better when started on oxycontin but now he hasn't been gettin his break through pain meds on time with aggravates him and makes his pain worse. Pt. also said he would like to go down to 5mg of percocet every 6hrs and increase his oxycontin to 30mg BID. Pt. A&Ox3, NAD, pt. found walking in hallway when pain service arrived. Pt. with normal gait and no limitation while walking. Discussed with PA covering that pain service isn't recommending opioids, but switching oxycontin to 30mg BID and percocet 5mg Q6hrs PRN is fine to do as patient is requesting.(Please be sure patient takes oxycontin in front of RN at time given and never crushed) Pain service also recommending that neurology consult be called in to rule out any causes for back pain.           [X ]  FARRAH  Reviewed and Copied to Chart

## 2019-09-05 PROCEDURE — 99232 SBSQ HOSP IP/OBS MODERATE 35: CPT

## 2019-09-05 RX ORDER — ACETAMINOPHEN 500 MG
650 TABLET ORAL ONCE
Refills: 0 | Status: COMPLETED | OUTPATIENT
Start: 2019-09-05 | End: 2019-09-05

## 2019-09-05 RX ORDER — OXYCODONE HYDROCHLORIDE 5 MG/1
20 TABLET ORAL EVERY 12 HOURS
Refills: 0 | Status: DISCONTINUED | OUTPATIENT
Start: 2019-09-05 | End: 2019-09-06

## 2019-09-05 RX ORDER — OXYCODONE AND ACETAMINOPHEN 5; 325 MG/1; MG/1
2 TABLET ORAL EVERY 6 HOURS
Refills: 0 | Status: DISCONTINUED | OUTPATIENT
Start: 2019-09-05 | End: 2019-09-06

## 2019-09-05 RX ORDER — HALOPERIDOL DECANOATE 100 MG/ML
5 INJECTION INTRAMUSCULAR EVERY 6 HOURS
Refills: 0 | Status: DISCONTINUED | OUTPATIENT
Start: 2019-09-05 | End: 2019-09-06

## 2019-09-05 RX ORDER — ONDANSETRON 8 MG/1
4 TABLET, FILM COATED ORAL ONCE
Refills: 0 | Status: COMPLETED | OUTPATIENT
Start: 2019-09-05 | End: 2019-09-05

## 2019-09-05 RX ADMIN — GABAPENTIN 800 MILLIGRAM(S): 400 CAPSULE ORAL at 05:14

## 2019-09-05 RX ADMIN — Medication 20 MILLIGRAM(S): at 05:14

## 2019-09-05 RX ADMIN — TIZANIDINE 4 MILLIGRAM(S): 4 TABLET ORAL at 09:17

## 2019-09-05 RX ADMIN — Medication 1 PATCH: at 07:13

## 2019-09-05 RX ADMIN — OXYCODONE HYDROCHLORIDE 20 MILLIGRAM(S): 5 TABLET ORAL at 19:00

## 2019-09-05 RX ADMIN — NAFCILLIN 200 GRAM(S): 10 INJECTION, POWDER, FOR SOLUTION INTRAVENOUS at 00:56

## 2019-09-05 RX ADMIN — OXYCODONE AND ACETAMINOPHEN 2 TABLET(S): 5; 325 TABLET ORAL at 13:06

## 2019-09-05 RX ADMIN — OXYCODONE AND ACETAMINOPHEN 2 TABLET(S): 5; 325 TABLET ORAL at 19:18

## 2019-09-05 RX ADMIN — OXYCODONE HYDROCHLORIDE 20 MILLIGRAM(S): 5 TABLET ORAL at 02:55

## 2019-09-05 RX ADMIN — TIZANIDINE 4 MILLIGRAM(S): 4 TABLET ORAL at 22:56

## 2019-09-05 RX ADMIN — OXYCODONE AND ACETAMINOPHEN 2 TABLET(S): 5; 325 TABLET ORAL at 13:50

## 2019-09-05 RX ADMIN — NAFCILLIN 200 GRAM(S): 10 INJECTION, POWDER, FOR SOLUTION INTRAVENOUS at 04:49

## 2019-09-05 RX ADMIN — OXYCODONE AND ACETAMINOPHEN 2 TABLET(S): 5; 325 TABLET ORAL at 04:51

## 2019-09-05 RX ADMIN — OXYCODONE AND ACETAMINOPHEN 2 TABLET(S): 5; 325 TABLET ORAL at 20:00

## 2019-09-05 RX ADMIN — ONDANSETRON 4 MILLIGRAM(S): 8 TABLET, FILM COATED ORAL at 04:48

## 2019-09-05 RX ADMIN — NYSTATIN CREAM 1 APPLICATION(S): 100000 CREAM TOPICAL at 05:14

## 2019-09-05 RX ADMIN — Medication 6 MILLIGRAM(S): at 22:57

## 2019-09-05 RX ADMIN — Medication 1 TABLET(S): at 13:06

## 2019-09-05 RX ADMIN — Medication 1 PATCH: at 22:30

## 2019-09-05 RX ADMIN — OXYCODONE AND ACETAMINOPHEN 2 TABLET(S): 5; 325 TABLET ORAL at 05:55

## 2019-09-05 RX ADMIN — NAFCILLIN 200 GRAM(S): 10 INJECTION, POWDER, FOR SOLUTION INTRAVENOUS at 09:17

## 2019-09-05 RX ADMIN — PANTOPRAZOLE SODIUM 40 MILLIGRAM(S): 20 TABLET, DELAYED RELEASE ORAL at 05:15

## 2019-09-05 RX ADMIN — Medication 1 PATCH: at 19:00

## 2019-09-05 RX ADMIN — OXYCODONE HYDROCHLORIDE 20 MILLIGRAM(S): 5 TABLET ORAL at 18:18

## 2019-09-05 RX ADMIN — OXYCODONE HYDROCHLORIDE 20 MILLIGRAM(S): 5 TABLET ORAL at 01:55

## 2019-09-05 RX ADMIN — TIZANIDINE 4 MILLIGRAM(S): 4 TABLET ORAL at 00:55

## 2019-09-05 RX ADMIN — Medication 25 MILLIGRAM(S): at 05:14

## 2019-09-05 RX ADMIN — NAFCILLIN 200 GRAM(S): 10 INJECTION, POWDER, FOR SOLUTION INTRAVENOUS at 13:07

## 2019-09-05 RX ADMIN — Medication 1 PATCH: at 22:56

## 2019-09-05 NOTE — PROGRESS NOTE ADULT - ASSESSMENT
54 m with IVDA, hepatitis C (not treated), RLE ORIF and hardware, previous bacteremia and lumbar osteo? 2004, presented with worsening back pain, with difficulty ambulating due to pain.  here afebrile normal WBC  blood cx: 2/2 MSSA  hep C viral load: [id number]  spine MRI: enhancing lesion on L3 s/o occult fx or a lesion but CT negative for FX or lesion  hiv negative    MSSA bacteremia and L3 osteo   active heroine use  repeat cx 8/12 negative   TTE negative  repeat MRI 8/19 with progressed enhancement and edema of L3 with perivertebral soft tissue swelling/enhancement, s/o evolving osteo, no epidural abscess  lethargy with ?drug abuse from family visits    * ESR and CRP improving   * c/w Nafcillin 2 q 4  * can place a PICC line but pt can not be discharged with a line in view of active IVDA  * will have continue the course here or another rehab facility  * will do an 8 week course for L3 osteo until 10/7  * f/u with pain management  * will need weekly CBC, CMP, ESR and CRP

## 2019-09-05 NOTE — PROGRESS NOTE ADULT - SUBJECTIVE AND OBJECTIVE BOX
LUZ BUCKLEY:[id number],   54yMale followed for:  No Known Allergies    PAST MEDICAL & SURGICAL HISTORY:  Sciatica  S/P ORIF (open reduction internal fixation) fracture    FAMILY HISTORY:    MEDICATIONS  (STANDING):  enalapril 20 milliGRAM(s) Oral every 12 hours  gabapentin 800 milliGRAM(s) Oral three times a day  hydrALAZINE 25 milliGRAM(s) Oral three times a day  lidocaine   Patch 1 Patch Transdermal daily  lidocaine   Patch 1 Patch Transdermal daily  melatonin 6 milliGRAM(s) Oral at bedtime  multivitamin      multivitamin 1 Tablet(s) Oral daily  nafcillin  IVPB      nafcillin  IVPB 2 Gram(s) IV Intermittent every 4 hours  nicotine -  14 mG/24Hr(s) Patch 1 patch Transdermal daily  nortriptyline 25 milliGRAM(s) Oral at bedtime  nystatin Powder 1 Application(s) Topical two times a day  oxyCODONE  ER Tablet 20 milliGRAM(s) Oral every 12 hours  pantoprazole    Tablet 40 milliGRAM(s) Oral before breakfast    MEDICATIONS  (PRN):  cloNIDine 0.1 milliGRAM(s) Oral two times a day PRN withdrawl symptoms  hydrOXYzine hydrochloride 50 milliGRAM(s) Oral every 8 hours PRN Anxiety  oxyCODONE    5 mG/acetaminophen 325 mG 2 Tablet(s) Oral every 6 hours PRN Severe Pain (7 - 10)  tiZANidine 4 milliGRAM(s) Oral every 6 hours PRN Muscle Spasm      Vital Signs Last 24 Hrs  T(C): 36.8 (05 Sep 2019 05:11), Max: 36.8 (04 Sep 2019 21:10)  T(F): 98.3 (05 Sep 2019 05:11), Max: 98.3 (05 Sep 2019 05:11)  HR: 85 (05 Sep 2019 05:11) (77 - 85)  BP: 133/90 (05 Sep 2019 05:11) (130/80 - 153/104)  BP(mean): --  RR: 16 (05 Sep 2019 05:11) (14 - 20)  SpO2: 97% (05 Sep 2019 05:11) (97% - 100%)  nc/at  s1s2  cta  soft, nt, nd no guarding or rebound  no c/c/e    CBC Full  -  ( 04 Sep 2019 06:20 )  WBC Count : 5.30 K/uL  RBC Count : 4.73 M/uL  Hemoglobin : 13.9 g/dL  Hematocrit : 43.7 %  Platelet Count - Automated : 301 K/uL  Mean Cell Volume : 92.4 fL  Mean Cell Hemoglobin : 29.4 pg  Mean Cell Hemoglobin Concentration : 31.8 %  Auto Neutrophil # : 2.29 K/uL  Auto Lymphocyte # : 2.17 K/uL  Auto Monocyte # : 0.54 K/uL  Auto Eosinophil # : 0.21 K/uL  Auto Basophil # : 0.05 K/uL  Auto Neutrophil % : 43.2 %  Auto Lymphocyte % : 40.9 %  Auto Monocyte % : 10.2 %  Auto Eosinophil % : 4.0 %  Auto Basophil % : 0.9 %    09-04    140  |  105  |  28<H>  ----------------------------<  97  4.6   |  23  |  1.00    Ca    9.8      04 Sep 2019 12:46  Phos  3.2     09-04  Mg     2.0     09-04    TPro  7.5  /  Alb  3.7  /  TBili  0.9  /  DBili  x   /  AST  41<H>  /  ALT  27  /  AlkPhos  58  09-04

## 2019-09-05 NOTE — PROGRESS NOTE ADULT - PROBLEM SELECTOR PLAN 2
-seen by psych.s/p Methadone.
-seen by psych.started on Methadone.
-seen by psych.s/p Methadone.
-seen by psych.started on Methadone.
-seen by psych.s/p Methadone.
-seen by psych.s/p Methadone.
-seen by psych.started on Methadone.

## 2019-09-05 NOTE — PROGRESS NOTE ADULT - PROBLEM SELECTOR PLAN 1
pain management.f/u CT -l-s SPINE.-neg for fracture or infection  MRI NOTED.  -l3 lesion- osteo likely.- MSSA Bacteremia seen by IF,cont. Nafcillin iv.NICHELLE-neg .ID f/u noted.repeat MRI-Back-l3 ENHANCEMENT-Infection can not be excluded.
pain management.f/u CT -l-s SPINE.-neg for fracture or infection  MRI NOTED.
pain management.f/u CT -l-s SPINE.-neg for fracture or infection  MRI NOTED.  -l3 lesion- osteo likely.- MSSA Bacteremia seen by IF,cont. Nafcillin iv.NICHELLE-neg .ID f/u noted.repeat MRI-Back-l3 ENHANCEMENT-Infection can not be excluded.
pain management.f/u CT -l-s SPINE.-neg for fracture or infection  MRI NOTED.  -l3 lesion- osteo likely.- MSSA Bacteremia seen by IF,cont. Nafcillin iv.NICHELLE-neg .ID f/u noted.repeat MRI-Back
pain management.f/u CT -l-s SPINE.-neg for fracture or infection  MRI NOTED.  -l3 lesion- osteo likely.- MSSA Bacteremia seen by IF,cont. Nafcillin iv.NICHELLE-neg .ID f/u noted.repeat MRI-Back-l3 ENHANCEMENT-Infection can not be excluded.
pain management.f/u CT -l-s SPINE.-neg for fracture or infection  MRI NOTED.  -l3 lesion- osteo likely.- MSSA Bacteremia seen by IF,cont. Nafcillin iv.NICHELLE.ID f/u noted.
pain management.f/u CT -l-s SPINE.-neg for fracture or infection  MRI NOTED.  -l3 lesion- osteo likely.- MSSA Bacteremia seen by IF,cont. cefazolin.repea cultures..
pain management.f/u CT -l-s SPINE.-neg for fracture or infection  MRI NOTED.  -l3 lesion- osteo likely.- MSSA Bacteremia seen by IF,started on cefazolin.repea cultures.seen by pain management.repeat blood cultures.
pain management.f/u CT -l-s SPINE.-neg for fracture or infection  MRI NOTED.  -l3 lesion- osteo likely.- MSSA Bacteremia seen by IF,started oncefazolin.repea cultures.seen by pain management.repeat blood cultures.
pain management.f/u CT -l-s SPINE.-neg for fracture or infection  MRI NOTED.  -l3 lesion-r/o osteo?- MSSA Bacteremia? seen by IF,started oncefazolin.repea cultures.seen by pain management
unclear etiology at this time; poss OM/infectious source vs disc herniation  CT ordered with and without contrast to eval for above  monitor for signs of infection; if febrile, pan culture and observe off abx as long as hemodynamically stable;   will check ESR, CRP  PT eval,pain meds as ordered.Pain management consult.f/u MRI Back.
pain management.f/u CT -l-s SPINE.-neg for fracture or infection  MRI NOTED.  -l3 lesion- osteo likely.- MSSA Bacteremia seen by IF,cont. Nafcillin iv.NICHELLE-neg .ID f/u noted.repeat MRI-Back-l3 ENHANCEMENT-Infection can not be excluded.
pain management.f/u CT -l-s SPINE. MRI NOTED.
pain management.f/u CT -l-s SPINE.-neg for fracture or infection  MRI NOTED.  -l3 lesion- osteo likely.- MSSA Bacteremia seen by IF,cont. Nafcillin iv.NICHELLE-neg .ID f/u noted.repeat MRI-Back-l3 ENHANCEMENT-Infection can not be excluded.
pain management.f/u CT -l-s SPINE.-neg for fracture or infection  MRI NOTED.  -l3 lesion- osteo likely.- MSSA Bacteremia seen by IF,cont. Nafcillin iv.NICHELLE-neg .ID f/u noted.repeat MRI-Back-l3 ENHANCEMENT-Infection can not be excluded.

## 2019-09-05 NOTE — PROGRESS NOTE ADULT - SUBJECTIVE AND OBJECTIVE BOX
LUZ BUCKLEY  54y  Male      Patient is a 54y old  Male who presents with a chief complaint of back pain and sciatica (05 Sep 2019 16:00)  Above noted.still arguing about pain meds,wants to be increased.wants to see spine MD.Theating to leave AMA.  NAD,NO SOB,NO CP,NO FEVER,C/O MOD Back pain    REVIEW OF SYSTEMS:  as above      INTERVAL HPI/OVERNIGHT EVENTS:  T(C): 36.4 (09-05-19 @ 13:01), Max: 36.8 (09-04-19 @ 21:10)  HR: 72 (09-05-19 @ 13:01) (72 - 108)  BP: 111/73 (09-05-19 @ 13:01) (111/73 - 149/88)  RR: 14 (09-05-19 @ 13:01) (14 - 16)  SpO2: 100% (09-05-19 @ 13:01) (97% - 100%)  Wt(kg): --  I&O's Summary    T(C): 36.4 (09-05-19 @ 13:01), Max: 36.8 (09-04-19 @ 21:10)  HR: 72 (09-05-19 @ 13:01) (72 - 108)  BP: 111/73 (09-05-19 @ 13:01) (111/73 - 149/88)  RR: 14 (09-05-19 @ 13:01) (14 - 16)  SpO2: 100% (09-05-19 @ 13:01) (97% - 100%)  Wt(kg): --Vital Signs Last 24 Hrs  T(C): 36.4 (05 Sep 2019 13:01), Max: 36.8 (04 Sep 2019 21:10)  T(F): 97.5 (05 Sep 2019 13:01), Max: 98.3 (05 Sep 2019 05:11)  HR: 72 (05 Sep 2019 13:01) (72 - 108)  BP: 111/73 (05 Sep 2019 13:01) (111/73 - 149/88)  BP(mean): --  RR: 14 (05 Sep 2019 13:01) (14 - 16)  SpO2: 100% (05 Sep 2019 13:01) (97% - 100%)    LABS:                        13.9   5.30  )-----------( 301      ( 04 Sep 2019 06:20 )             43.7     09-04    140  |  105  |  28<H>  ----------------------------<  97  4.6   |  23  |  1.00    Ca    9.8      04 Sep 2019 12:46  Phos  3.2     09-04  Mg     2.0     09-04    TPro  7.5  /  Alb  3.7  /  TBili  0.9  /  DBili  x   /  AST  41<H>  /  ALT  27  /  AlkPhos  58  09-04        CAPILLARY BLOOD GLUCOSE                PAST MEDICAL & SURGICAL HISTORY:  Sciatica  S/P ORIF (open reduction internal fixation) fracture      MEDICATIONS  (STANDING):  enalapril 20 milliGRAM(s) Oral every 12 hours  gabapentin 800 milliGRAM(s) Oral three times a day  hydrALAZINE 25 milliGRAM(s) Oral three times a day  lidocaine   Patch 1 Patch Transdermal daily  lidocaine   Patch 1 Patch Transdermal daily  melatonin 6 milliGRAM(s) Oral at bedtime  multivitamin      multivitamin 1 Tablet(s) Oral daily  nafcillin  IVPB      nafcillin  IVPB 2 Gram(s) IV Intermittent every 4 hours  nicotine -  14 mG/24Hr(s) Patch 1 patch Transdermal daily  nortriptyline 25 milliGRAM(s) Oral at bedtime  nystatin Powder 1 Application(s) Topical two times a day  oxyCODONE  ER Tablet 20 milliGRAM(s) Oral every 12 hours  pantoprazole    Tablet 40 milliGRAM(s) Oral before breakfast    MEDICATIONS  (PRN):  cloNIDine 0.1 milliGRAM(s) Oral two times a day PRN withdrawl symptoms  haloperidol    Injectable 5 milliGRAM(s) IntraMuscular every 6 hours PRN agitation  hydrOXYzine hydrochloride 50 milliGRAM(s) Oral every 8 hours PRN Anxiety  oxyCODONE    5 mG/acetaminophen 325 mG 2 Tablet(s) Oral every 6 hours PRN Severe Pain (7 - 10)  tiZANidine 4 milliGRAM(s) Oral every 6 hours PRN Muscle Spasm        RADIOLOGY & ADDITIONAL TESTS:    Imaging Personally Reviewed:  [ ] YES  [ ] NO    Consultant(s) Notes Reviewed:  [ x] YES  [ ] NO    PHYSICAL EXAM:  GENERAL: NAD, well-groomed, well-developed  HEAD:  Atraumatic, Normocephalic  EYES: EOMI, PERRLA, conjunctiva and sclera clear  ENMT: No tonsillar erythema, exudates, or enlargement; Moist mucous membranes, Good dentition, No lesions  NECK: Supple, No JVD, Normal thyroid  NERVOUS SYSTEM:  Alert & Oriented X3, Good concentration; Motor Strength 5/5 B/L upper and lower extremities; DTRs 2+ intact and symmetric  CHEST/LUNG: Clear to percussion bilaterally; No rales, rhonchi, wheezing, or rubs  HEART: Regular rate and rhythm; No murmurs, rubs, or gallops  ABDOMEN: Soft, Nontender, Nondistended; Bowel sounds present  EXTREMITIES:  2+ Peripheral Pulses, No clubbing, cyanosis, or edema Back -no tenderness  LYMPH: No lymphadenopathy noted  SKIN: No rashes or lesions    Care Discussed with Consultants/Other Providers [ ] YES  [ ] NO      Code Status: [] Full Code [] DNR [] DNI [] Goals of Care:   Disposition: [] ICU [] Stroke Unit [] RCU []PCU []Floor [] Discharge Home         JEANINE RodriguezP

## 2019-09-05 NOTE — PROGRESS NOTE ADULT - ATTENDING COMMENTS
-cont.iv antibiotics.pain management-f/u noted.oxycontin added. .ADDICTION PSYCH.CONSULT.ortho-spine consult/f/u  -Hep C-outpt f/u  -Likely fungal rash-perineal area-Nystatin powder as ordered.  -s/p Narcan.Likely drug abuse.Urine tox.  -will continue current dose of meds and will not increase anypain meds.Lidoderm patch ordered.Patient is threating to leave , verbally abusive.  -d/w staff.supervised family visits.
-d/w staff.MRI back-noted.F/ ct.Ortho f/u.pain management.
-cont.iv antibiotics.pain management-f/u noted.oxycontin added. To call for neurosurgery f/u -TO EVAL BACK PAIN  -d/w NP
-cont.iv antibiotics.pain management-f/u noted.oxycontin added. .ADDICTION PSYCH.CONSULT.ortho-spine consult/f/u  -Hep C-outpt f/u  -Likely fungal rash-perineal area-Nystatin powder as ordered.  -s/p Narcan.Likely drug abuse.Urine tox.  -will continue current dose of meds and will not increase anypain meds.Lidoderm patch ordered.Patient is threating to leave , verbally abusive.  -d/w staff
-HTN- =improved  -Back pain-pain management f/u.  -d/w PA
-Toradol 15 mg ivx1 dose.cont.other meds.pain management f/u  -HTN-start lisinopril 10 mg po daily
-HTN- uncontrolled. increase lisinopril 20 mg po twice per day-  -Back pain-pain management added lidoderm patch,percocet 2 tab q 6hr prnx 5 days.,Toradol 15 mg iv q 6hrx 2 days,
-HTN- uncontrolled. increase lisinopril 20 mg po twice per day-monitor BP  -Back pain-pain management added lidoderm patch,percocet 2 tab q 6hr prnx 5 days.,Toradol 15 mg iv q 6hrx 2 days,=PAIN MANAGEMENT F/U
-HTN-start lisinopril 10 mg po daily
-HTN-start lisinopril 10 mg po daily-Improving
-HTN-start lisinopril 10 mg po daily-Improving  -Back pain-pain management added lidoderm patch,percocet
-HTN-start lisinopril 10 mg po daily-Improving  -Back pain-pain management added lidoderm patch,percocet
-HTN-start lisinopril 10 mg po daily-Improving  -Back pain-pain management added lidoderm patch,percocet,
-HTN-start lisinopril 10 mg po daily-Improving  -Back pain-pain management added lidoderm patch,percocet,Toradol 15 mg iv q 6hrx 2 days,
-HTN-start lisinopril 10 mg po daily-Improving  -Back pain-pain management added lidoderm patch,percocet,Toradol 30 mg ivx1 dose pror to MRI
-HTN-start lisinopril 10 mg po daily-Improving  -Back pain-pain management f/u.Toradol 30 mg x 1dose.iv
-cont.iv antibiotics.pain management
-cont.iv antibiotics.pain management
-cont.iv antibiotics.pain management-added-Tramadol.monitor for pain.  -d/w NP
-cont.iv antibiotics.pain management-f/u noted.oxycontin added. .ADDICTION PSYCH.CONSULT.ortho-spine consult/f/u  -Hep C-outpt f/u  -Likely fungal rash-perineal area-Nystatin powder as ordered.  -d/w NP  -ONE DOSE OF OXYCODONE 5 MG given.Pain management f/u
-cont.iv antibiotics.pain management-f/u noted.oxycontin added. .ADDICTION PSYCH.CONSULT.ortho-spine consult/f/u  -Hep C-outpt f/u  -Likely fungal rash-perineal area-Nystatin powder as ordered.  -d/w NP,RN,Family at bedside.
-cont.iv antibiotics.pain management-f/u noted.oxycontin added. .ADDICTION PSYCH.CONSULT.ortho-spine consult/f/u  -Hep C-outpt f/u  -Likely fungal rash-perineal area-Nystatin powder as ordered.  -d/w NP,RN,Family at bedside.
-cont.iv antibiotics.pain management-f/u noted.oxycontin added. .aDDICTION PSYCH.CONSULT  -Likely fungal rash-perineal area-Nystatin powder as oedered.  -d/w NP,RN,Family at bedside.
-cont.iv antibiotics.pain management-f/u noted.oxycontin added. .aDDICTION PSYCH.CONSULT.ortho-spine consult/f/u  -Likely fungal rash-perineal area-Nystatin powder as oedered.  -d/w NP,RN,Family at bedside.
-cont.iv antibiotics.pain management-f/u noted.oxycontin added. To call for neurosurgery f/u -TO EVAL BACK PAIN.aDDICTION PSYCH.CONSULT  -d/w NP,RN
-cont.iv antibiotics.pain management-f/u noted.oxycontin added. To call for neurosurgery f/u -TO EVAL BACK PAIN.aDDICTION PSYCH.CONSULT  -d/w NP,RN,Family at bedside.
-d/w staff
-cont.iv antibiotics.pain management-f/u noted.oxycontin added. .aDDICTION PSYCH.CONSULT  -d/w NP,RN,Family at bedside.

## 2019-09-05 NOTE — PROGRESS NOTE ADULT - SUBJECTIVE AND OBJECTIVE BOX
Follow Up:  MSSA bacteremia    Interval History: pt was found to be lethargic after a family visit and  narcan was given today again had another episode and when security attempted to search his belongings, he threatened to leave [place], but now changed his mind and is staying     ROS:      All other systems negative    Constitutional: no fever, no chills, no weight loss, no night sweats  Eye: no eye pain, no redness, no vision changes  ENT:  no sore throat, no rhinorrhea  Cardiovascular:  no chest pain, no palpitation  Respiratory:  no SOB, no cough  GI:  no abd pain, no vomiting, no diarrhea  urinary: no dysuria, no hematuria, no flank pain  : no penile discharge or bleeding  musculoskeletal:   back pain   skin:  no rash  neurology:  no headache, no seizure, no change in mental status          Allergies  No Known Allergies        ANTIMICROBIALS:  nafcillin  IVPB    nafcillin  IVPB 2 every 4 hours      OTHER MEDS:  cloNIDine 0.1 milliGRAM(s) Oral two times a day PRN  enalapril 20 milliGRAM(s) Oral every 12 hours  gabapentin 800 milliGRAM(s) Oral three times a day  haloperidol    Injectable 5 milliGRAM(s) IntraMuscular every 6 hours PRN  hydrALAZINE 25 milliGRAM(s) Oral three times a day  hydrOXYzine hydrochloride 50 milliGRAM(s) Oral every 8 hours PRN  lidocaine   Patch 1 Patch Transdermal daily  lidocaine   Patch 1 Patch Transdermal daily  melatonin 6 milliGRAM(s) Oral at bedtime  multivitamin      multivitamin 1 Tablet(s) Oral daily  nicotine -  14 mG/24Hr(s) Patch 1 patch Transdermal daily  nortriptyline 25 milliGRAM(s) Oral at bedtime  nystatin Powder 1 Application(s) Topical two times a day  oxyCODONE    5 mG/acetaminophen 325 mG 2 Tablet(s) Oral every 6 hours PRN  oxyCODONE  ER Tablet 20 milliGRAM(s) Oral every 12 hours  pantoprazole    Tablet 40 milliGRAM(s) Oral before breakfast  tiZANidine 4 milliGRAM(s) Oral every 6 hours PRN      Vital Signs Last 24 Hrs  T(C): 36.4 (05 Sep 2019 13:01), Max: 36.8 (04 Sep 2019 21:10)  T(F): 97.5 (05 Sep 2019 13:01), Max: 98.3 (05 Sep 2019 05:11)  HR: 72 (05 Sep 2019 13:01) (72 - 108)  BP: 111/73 (05 Sep 2019 13:01) (111/73 - 149/88)  BP(mean): --  RR: 14 (05 Sep 2019 13:01) (14 - 16)  SpO2: 100% (05 Sep 2019 13:01) (97% - 100%)    Physical Exam:  General:    NAD, non toxic  Head: atraumatic, normocephalic  Eyes: normal sclera and conjunctiva  ENT:   no oropharyngeal lesions, no LAD, neck supple  Cardio:    regular S1,S2  Respiratory:   clear b/l, no wheezing  abd:   soft, BS +, not tender, no hepatosplenomegaly  :     no CVAT, no suprapubic tenderness, no romero  Musculoskeletal : RLE scar from ORIF but no tenderness  Skin:    no rash  vascular: no phlebitis, normal pulses  Neurologic:     no focal deficits  psych: normal affect                          13.9   5.30  )-----------( 301      ( 04 Sep 2019 06:20 )             43.7       09-04    140  |  105  |  28<H>  ----------------------------<  97  4.6   |  23  |  1.00    Ca    9.8      04 Sep 2019 12:46  Phos  3.2     09-04  Mg     2.0     09-04    TPro  7.5  /  Alb  3.7  /  TBili  0.9  /  DBili  x   /  AST  41<H>  /  ALT  27  /  AlkPhos  58  09-04          MICROBIOLOGY:  v  BLOOD PERIPHERAL  08-12-19 --  --  --      BLOOD VENOUS  08-10-19 --  --  --      BLOOD  08-08-19 --  --  --      BLOOD  08-07-19 --  --  Staphylococcus aureus  BLOOD CULTURE PCR                RADIOLOGY:  Images below reviewed personally  < from: MR Lumbar Spine w/wo IV Cont (08.19.19 @ 11:13) >    IMPRESSION: Redemonstrated abnormal signal involving the L3 vertebral   body with associated enhancement and edema which has gradually progressed   from the prior exams. There is a small amount of prevertebral soft tissue   swelling/enhancement. Findings favor evolving osteomyelitis.    No evidence of epidural abscess.

## 2019-09-05 NOTE — PROGRESS NOTE ADULT - PROBLEM SELECTOR PLAN 3
-supplement
low albumin  pt with poor appetite due to above  currently hungry  will add ensure to diet
-supplement
low albumin  pt with poor appetite due to above  currently hungry  will add ensure to diet
-supplement
-supplement
low albumin  pt with poor appetite due to above  currently hungry  will add ensure to diet

## 2019-09-05 NOTE — PROGRESS NOTE ADULT - PROBLEM SELECTOR PROBLEM 3
Moderate protein-calorie malnutrition

## 2019-09-05 NOTE — PROGRESS NOTE ADULT - PROBLEM SELECTOR PROBLEM 2
Heroin dependence

## 2019-09-05 NOTE — PROGRESS NOTE ADULT - PROBLEM SELECTOR PROBLEM 1
Acute right-sided low back pain with right-sided sciatica

## 2019-09-06 VITALS
SYSTOLIC BLOOD PRESSURE: 138 MMHG | HEART RATE: 87 BPM | TEMPERATURE: 99 F | RESPIRATION RATE: 18 BRPM | OXYGEN SATURATION: 100 % | DIASTOLIC BLOOD PRESSURE: 88 MMHG

## 2019-09-06 PROCEDURE — 99232 SBSQ HOSP IP/OBS MODERATE 35: CPT

## 2019-09-06 RX ORDER — CEPHALEXIN 500 MG
1 CAPSULE ORAL
Qty: 144 | Refills: 0
Start: 2019-09-06 | End: 2019-10-11

## 2019-09-06 RX ORDER — LIDOCAINE 4 G/100G
1 CREAM TOPICAL
Qty: 30 | Refills: 0
Start: 2019-09-06 | End: 2019-10-05

## 2019-09-06 RX ORDER — HYDRALAZINE HCL 50 MG
1 TABLET ORAL
Qty: 90 | Refills: 0
Start: 2019-09-06

## 2019-09-06 RX ORDER — PANTOPRAZOLE SODIUM 20 MG/1
1 TABLET, DELAYED RELEASE ORAL
Qty: 30 | Refills: 0
Start: 2019-09-06

## 2019-09-06 RX ORDER — NORTRIPTYLINE HYDROCHLORIDE 10 MG/1
1 CAPSULE ORAL
Qty: 30 | Refills: 0
Start: 2019-09-06

## 2019-09-06 RX ORDER — GABAPENTIN 400 MG/1
1 CAPSULE ORAL
Qty: 90 | Refills: 0
Start: 2019-09-06

## 2019-09-06 RX ORDER — LANOLIN ALCOHOL/MO/W.PET/CERES
2 CREAM (GRAM) TOPICAL
Qty: 0 | Refills: 0 | DISCHARGE
Start: 2019-09-06

## 2019-09-06 RX ADMIN — OXYCODONE AND ACETAMINOPHEN 2 TABLET(S): 5; 325 TABLET ORAL at 10:28

## 2019-09-06 RX ADMIN — OXYCODONE HYDROCHLORIDE 20 MILLIGRAM(S): 5 TABLET ORAL at 06:18

## 2019-09-06 RX ADMIN — OXYCODONE AND ACETAMINOPHEN 2 TABLET(S): 5; 325 TABLET ORAL at 09:54

## 2019-09-06 RX ADMIN — OXYCODONE AND ACETAMINOPHEN 2 TABLET(S): 5; 325 TABLET ORAL at 02:10

## 2019-09-06 RX ADMIN — OXYCODONE AND ACETAMINOPHEN 2 TABLET(S): 5; 325 TABLET ORAL at 01:38

## 2019-09-06 RX ADMIN — Medication 1 PATCH: at 08:08

## 2019-09-06 RX ADMIN — OXYCODONE HYDROCHLORIDE 20 MILLIGRAM(S): 5 TABLET ORAL at 07:12

## 2019-09-06 NOTE — PROGRESS NOTE ADULT - SUBJECTIVE AND OBJECTIVE BOX
[facility]   MD EREN GALAN D.O. FATIMA SHEIKH, D.O. RUORU WONG, PA    OFFICE: [phone]  DR FORREST CELL: [phone]  DR. CALLEJAS CELL: [phone]  DR. OCHOA CELL: [phone]  WANDA CHUN CELL: [phone]      RENAL FOLLOW UP NOTE  --------------------------------------------------------------------------------  HPI: Pt seen and examined. states he wants to leave the hospital. does not provide specific reason.    reports he has back pain. denies fever, chills, dysuria, hematuria.         PAST HISTORY  --------------------------------------------------------------------------------  No significant changes to PMH, PSH, FHx, SHx, unless otherwise noted    ALLERGIES & MEDICATIONS  --------------------------------------------------------------------------------  Allergies    No Known Allergies    Intolerances      Standing Inpatient Medications  enalapril 20 milliGRAM(s) Oral every 12 hours  gabapentin 800 milliGRAM(s) Oral three times a day  hydrALAZINE 25 milliGRAM(s) Oral three times a day  lidocaine   Patch 1 Patch Transdermal daily  lidocaine   Patch 1 Patch Transdermal daily  melatonin 6 milliGRAM(s) Oral at bedtime  multivitamin      multivitamin 1 Tablet(s) Oral daily  nafcillin  IVPB      nafcillin  IVPB 2 Gram(s) IV Intermittent every 4 hours  nicotine -  14 mG/24Hr(s) Patch 1 patch Transdermal daily  nortriptyline 25 milliGRAM(s) Oral at bedtime  nystatin Powder 1 Application(s) Topical two times a day  oxyCODONE  ER Tablet 20 milliGRAM(s) Oral every 12 hours  pantoprazole    Tablet 40 milliGRAM(s) Oral before breakfast    PRN Inpatient Medications  cloNIDine 0.1 milliGRAM(s) Oral two times a day PRN  haloperidol    Injectable 5 milliGRAM(s) IntraMuscular every 6 hours PRN  hydrOXYzine hydrochloride 50 milliGRAM(s) Oral every 8 hours PRN  oxyCODONE    5 mG/acetaminophen 325 mG 2 Tablet(s) Oral every 6 hours PRN  tiZANidine 4 milliGRAM(s) Oral every 6 hours PRN      REVIEW OF SYSTEMS  --------------------------------------------------------------------------------  General: no fever  CVS: no chest pain  RESP: no sob, no cough  ABD: no abdominal pain  : no dysuria,  MSK: no edema     VITALS/PHYSICAL EXAM  --------------------------------------------------------------------------------  T(C): 37 (09-06-19 @ 06:14), Max: 37.2 (09-05-19 @ 21:44)  HR: 87 (09-06-19 @ 06:14) (87 - 88)  BP: 138/88 (09-06-19 @ 06:14) (126/81 - 138/88)  RR: 18 (09-06-19 @ 06:14) (16 - 18)  SpO2: 100% (09-06-19 @ 06:14) (97% - 100%)  Wt(kg): --        09-06-19 @ 07:01  -  09-06-19 @ 21:41  --------------------------------------------------------  IN: 0 mL / OUT: 625 mL / NET: -625 mL      Physical Exam:  	Gen: NAD  	HEENT: MMM  	Pulm: CTA B/L  	CV: S1S2  	Abd: Soft, +BS  	Ext: No LE edema B/L              Neuro: Awake   	Skin: Warm and Dry   	    LABS/STUDIES  --------------------------------------------------------------------------------                Creatinine Trend:  SCr 1.00 [09-04 @ 12:46]  SCr 0.95 [09-04 @ 06:20]  SCr 1.02 [08-29 @ 06:00]  SCr 1.10 [08-28 @ 06:00]  SCr 1.11 [08-22 @ 06:00]    Urinalysis - [08-12-19 @ 21:20]      Color YELLOW / Appearance CLEAR / SG 1.033 / pH 5.5      Gluc NEGATIVE / Ketone NEGATIVE  / Bili NEGATIVE / Urobili NORMAL       Blood NEGATIVE / Protein 30 / Leuk Est NEGATIVE / Nitrite NEGATIVE      RBC 0-2 / WBC 3-5 / Hyaline NEGATIVE / Gran  / Sq Epi OCC / Non Sq Epi  / Bacteria NEGATIVE        HIV Nonreactive The HIV Ag/Ab Combo test performed screens for HIV-1 p24  antigen, antibodies to HIV-1 (group M and group O), and  antibodies to HIV-2. All specimens repeatedly reactive  will reflex to an HIV 1/2 antibody confirmation and  differentiation test. This assay detects p24 antigen which  may be present prior to the development of HIV antibodies,  therefore a reactive result with a negative HIV 1/2 AB  Confirmation should be followed up with HIV-1 RNA, HIV-2  RNA and repeat testing in 4-8 weeks. A nonreactive result  does not preclude previous exposure to or infection with  HIV-1 or HIV-2. [place] prohibits disclosure of this  result to any unauthorized party.      [08-10-19 @ 06:00]

## 2019-09-06 NOTE — PROGRESS NOTE ADULT - ASSESSMENT
55 yo male active heroin use with chronic back pain, kidney cyst a/w acute on chronic back pain with sciatica with difficulty ambulating due to pain 8/4. patient found to have s. aureus bacteremia on cefazolin iv. nephrology consulted for ARNOLDO.     ARNOLDO  peaked 1.79   ARNOLDO likely prerenal on NSAIDs and lisinopril  now off ketorolac iv for pain (8/26/19)  off ibuprofen.   renal function improved with IVF; at likely baseline now, cr ~1  renal us done showed left renal cyst  Pt currently on enalapril  monitor bmp, urine output     HTN  Suboptimal  Can add a CCB if needed.   continue enalapril  monitor bmp and bp    Hep C  f/u GI  outpatient treatment    Proteinuria  mild  p/c ratio <300, Monitor  hep c +

## 2019-09-06 NOTE — PROGRESS NOTE ADULT - SUBJECTIVE AND OBJECTIVE BOX
LUZ BUCKLEY:[id number],   54yMale followed for:  No Known Allergies    PAST MEDICAL & SURGICAL HISTORY:  Sciatica  S/P ORIF (open reduction internal fixation) fracture    FAMILY HISTORY:    MEDICATIONS  (STANDING):  enalapril 20 milliGRAM(s) Oral every 12 hours  gabapentin 800 milliGRAM(s) Oral three times a day  hydrALAZINE 25 milliGRAM(s) Oral three times a day  lidocaine   Patch 1 Patch Transdermal daily  lidocaine   Patch 1 Patch Transdermal daily  melatonin 6 milliGRAM(s) Oral at bedtime  multivitamin      multivitamin 1 Tablet(s) Oral daily  nafcillin  IVPB      nafcillin  IVPB 2 Gram(s) IV Intermittent every 4 hours  nicotine -  14 mG/24Hr(s) Patch 1 patch Transdermal daily  nortriptyline 25 milliGRAM(s) Oral at bedtime  nystatin Powder 1 Application(s) Topical two times a day  oxyCODONE  ER Tablet 20 milliGRAM(s) Oral every 12 hours  pantoprazole    Tablet 40 milliGRAM(s) Oral before breakfast    MEDICATIONS  (PRN):  cloNIDine 0.1 milliGRAM(s) Oral two times a day PRN withdrawl symptoms  haloperidol    Injectable 5 milliGRAM(s) IntraMuscular every 6 hours PRN agitation  hydrOXYzine hydrochloride 50 milliGRAM(s) Oral every 8 hours PRN Anxiety  oxyCODONE    5 mG/acetaminophen 325 mG 2 Tablet(s) Oral every 6 hours PRN Severe Pain (7 - 10)  tiZANidine 4 milliGRAM(s) Oral every 6 hours PRN Muscle Spasm      Vital Signs Last 24 Hrs  T(C): 37 (06 Sep 2019 06:14), Max: 37.2 (05 Sep 2019 21:44)  T(F): 98.6 (06 Sep 2019 06:14), Max: 98.9 (05 Sep 2019 21:44)  HR: 87 (06 Sep 2019 06:14) (72 - 108)  BP: 138/88 (06 Sep 2019 06:14) (111/73 - 149/88)  BP(mean): --  RR: 18 (06 Sep 2019 06:14) (14 - 18)  SpO2: 100% (06 Sep 2019 06:14) (97% - 100%)  nc/at  s1s2  cta  soft, nt, nd no guarding or rebound  no c/c/e      09-04    140  |  105  |  28<H>  ----------------------------<  97  4.6   |  23  |  1.00    Ca    9.8      04 Sep 2019 12:46

## 2019-09-06 NOTE — PROGRESS NOTE ADULT - PROVIDER SPECIALTY LIST ADULT
Gastroenterology
Infectious Disease
Internal Medicine
Nephrology
Pain Medicine
Pain Medicine
Gastroenterology
Nephrology
Internal Medicine
Pain Medicine
Gastroenterology
Internal Medicine
Internal Medicine
Nephrology
Nephrology
Infectious Disease
Internal Medicine

## 2019-09-06 NOTE — PROGRESS NOTE ADULT - SUBJECTIVE AND OBJECTIVE BOX
Follow Up:  MSSA bacteremia    Interval History: pt is agitated to be discharged because his back pain is not getting better and he is not getting proper pain managemetn    ROS:    All other systems negative    Constitutional: no fever, no chills, no weight loss, no night sweats  Eye: no eye pain, no redness, no vision changes  ENT:  no sore throat, no rhinorrhea  Cardiovascular:  no chest pain, no palpitation  Respiratory:  no SOB, no cough  GI:  no abd pain, no vomiting, no diarrhea  urinary: no dysuria, no hematuria, no flank pain  : no penile discharge or bleeding  musculoskeletal:   back pain   skin:  no rash  neurology:  no headache, no seizure, no change in mental status        Allergies  No Known Allergies        ANTIMICROBIALS:  nafcillin  IVPB    nafcillin  IVPB 2 every 4 hours      OTHER MEDS:  cloNIDine 0.1 milliGRAM(s) Oral two times a day PRN  enalapril 20 milliGRAM(s) Oral every 12 hours  gabapentin 800 milliGRAM(s) Oral three times a day  haloperidol    Injectable 5 milliGRAM(s) IntraMuscular every 6 hours PRN  hydrALAZINE 25 milliGRAM(s) Oral three times a day  hydrOXYzine hydrochloride 50 milliGRAM(s) Oral every 8 hours PRN  lidocaine   Patch 1 Patch Transdermal daily  lidocaine   Patch 1 Patch Transdermal daily  melatonin 6 milliGRAM(s) Oral at bedtime  multivitamin      multivitamin 1 Tablet(s) Oral daily  nicotine -  14 mG/24Hr(s) Patch 1 patch Transdermal daily  nortriptyline 25 milliGRAM(s) Oral at bedtime  nystatin Powder 1 Application(s) Topical two times a day  oxyCODONE    5 mG/acetaminophen 325 mG 2 Tablet(s) Oral every 6 hours PRN  oxyCODONE  ER Tablet 20 milliGRAM(s) Oral every 12 hours  pantoprazole    Tablet 40 milliGRAM(s) Oral before breakfast  tiZANidine 4 milliGRAM(s) Oral every 6 hours PRN      Vital Signs Last 24 Hrs  T(C): 37 (06 Sep 2019 06:14), Max: 37.2 (05 Sep 2019 21:44)  T(F): 98.6 (06 Sep 2019 06:14), Max: 98.9 (05 Sep 2019 21:44)  HR: 87 (06 Sep 2019 06:14) (72 - 88)  BP: 138/88 (06 Sep 2019 06:14) (111/73 - 138/88)  BP(mean): --  RR: 18 (06 Sep 2019 06:14) (14 - 18)  SpO2: 100% (06 Sep 2019 06:14) (97% - 100%)    Physical Exam:  General:    NAD, non toxic  Head: atraumatic, normocephalic  Eyes: normal sclera and conjunctiva  ENT:   no oropharyngeal lesions, no LAD, neck supple  Cardio:    regular S1,S2  Respiratory:   clear b/l, no wheezing  abd:   soft, BS +, not tender, no hepatosplenomegaly  :     no CVAT, no suprapubic tenderness, no romero  Musculoskeletal : RLE scar from ORIF but no tenderness, b/l LE edema  Skin:    no rash  vascular: no phlebitis, normal pulses  Neurologic:     no focal deficits  psych: normal affect        09-04    140  |  105  |  28<H>  ----------------------------<  97  4.6   |  23  |  1.00    Ca    9.8      04 Sep 2019 12:46            MICROBIOLOGY:  v  BLOOD PERIPHERAL  08-12-19 --  --  --      BLOOD VENOUS  08-10-19 --  --  --      BLOOD  08-08-19 --  --  --                RADIOLOGY:  Images below reviewed personally  < from: MR Lumbar Spine w/wo IV Cont (08.19.19 @ 11:13) >  IMPRESSION: Redemonstrated abnormal signal involving the L3 vertebral   body with associated enhancement and edema which has gradually progressed   from the prior exams. There is a small amount of prevertebral soft tissue   swelling/enhancement. Findings favor evolving osteomyelitis.    No evidence of epidural abscess.

## 2019-09-06 NOTE — PROGRESS NOTE ADULT - NSHPATTENDINGPLANDISCUSS_GEN_ALL_CORE
the primary team

## 2019-09-06 NOTE — PROVIDER CONTACT NOTE (OTHER) - DATE AND TIME:
19-Aug-2019 21:45
01-Sep-2019 21:25
03-Sep-2019 09:00
04-Sep-2019 19:15
05-Sep-2019 17:45
05-Sep-2019 21:00
06-Aug-2019 00:17
06-Sep-2019 14:00
07-Aug-2019 13:52
16-Aug-2019 20:00
16-Aug-2019 23:00
17-Aug-2019 15:32
17-Aug-2019 18:45
17-Aug-2019 19:42
17-Aug-2019 21:30
19-Aug-2019 20:41
22-Aug-2019
22-Aug-2019 18:20
25-Aug-2019 15:00
26-Aug-2019 06:06
31-Aug-2019 00:59
04-Sep-2019 19:54
18-Aug-2019 21:10

## 2019-09-06 NOTE — PROVIDER CONTACT NOTE (OTHER) - RECOMMENDATIONS
MD notified - BP meds/pain meds?
Requested ADS provider to physically examine patient and order narcan.
NP to assess patient
will order one dose of toradol 30mg and will continue to monitor
Escalate as needed
Give antihypertensive, monitor BP, NP to assess pt
MD notified - EKG will be done.
Notify MD, verify home medication regimen
Notify RN or provider with advanced IV placement competency to place line.
Okay to give Pt pain meds 20 mins earlier?
One time IVP BP med?  Assess pt
Recommended ADS to come and assess patient
TYRONE azar made aware of pt request.  Risks of elevated B/P explained. Pt verbalized an understanding.
Team place midline or arrow catheter so that patient can complete IV abx course without multiple IV sticks
To get another line.
Tried couple times then escalate to team. JENNIFER Alonzo tried also was not able to get line
monitor

## 2019-09-06 NOTE — PROVIDER CONTACT NOTE (OTHER) - BACKGROUND
53 y/o M sciatica
53 yo M admit with sciatica. Pt has chronic back pain.
53 yo male with h/o chronic back pain, active heroin user a/w acute on chronic back pain
55 yo M admit with sciatica, pmh chronic back pain.
Patient admitted for Back pain
Patient had family members over. Family left, patient went to the bathroom for 45 mins. patient has history of heroin abuse
Patient is admitted for back pain.
Patient is admitted for back pain.
Patient needs 5 week course of IV abx
Pt adm for back pain needs IV avx  No iv access team aware
Pt adm for chronic back pain  + Bactremia- needs long term IV abx.
Pt adm for sciatica  Long term IV abx
Pt admitted for sciatica and positive blood culture.
Pt admitted with sciatica HX HTN and heroin  use
Pt here for pain & antibiotic 8 week therapy
Pt receiving IV abx
Pt refused afternoon Vitals.
hx hypertention
pt admitted for sciatica
Active heroin user, admitted for pain management of sciatica.
admitted for sciatica

## 2019-09-06 NOTE — PROGRESS NOTE ADULT - ASSESSMENT
54 m with IVDA, hepatitis C (not treated), RLE ORIF and hardware, previous bacteremia and lumbar osteo? 2004, presented with worsening back pain, with difficulty ambulating due to pain.  here afebrile normal WBC  blood cx: 2/2 MSSA  hep C viral load: [id number]  spine MRI: enhancing lesion on L3 s/o occult fx or a lesion but CT negative for FX or lesion  hiv negative    MSSA bacteremia and L3 osteo   active heroine use  repeat cx 8/12 negative   TTE negative  repeat MRI 8/19 with progressed enhancement and edema of L3 with perivertebral soft tissue swelling/enhancement, s/o evolving osteo, no epidural abscess  lethargy with ?drug abuse from family visits, now very agitated that he is not getting proper pain management and wants to be discharged    * ESR and CRP improving   * c/w Nafcillin 2 q 4 while in the hospital  * will do an 8 week course for L3 osteo until 10/7  * pain is very agitated to be discharged and believes the current antibiotics is not improving the back pain so requested to be discharged on PO antibiotics, I explained that he needs IV antibiotics for 8 weeks, and the risks and benefits of PO antibiotics with recurrence, he understood and requested PO antibiotics  * pt already had over 4 weeks of IV antibiotics, can switch to PO keflex 500 q 6 + doxy 100 bid to complete the 8 weeks  * pt needs ID and spine follow up

## 2019-09-06 NOTE — PROVIDER CONTACT NOTE (OTHER) - SITUATION
Pt BP was 152/108
Pt's /104
/106 144/100
/107, HR 71
/208, HR 73
At 2052 pt with /102, HR 84. Repeat BP at 2104 /88, HR 85.
For Pt Damon Ledesma Rm 310B. Pt continues to have elevated DBP- see flowsheet.   Pt started on home BP med Vasotec 20mg daily   Pt given first dose- DBP still elevated
For Pt Damon Ledesma Rm 410B.   Pt c/o back pain unrelieved with Tramadol 25mg- requesting to get Toradol 30mg IVP.  Pt requesting to get Oxy/Percocet 20 mins before due time. Last dose was 11:06 am
High blood pressure
Patient not easy to arouse. Called ADS to notify
Patient refusing PIV change, due today
Patient's BP is 148/114 electronically and manually 146/112
Pt /98
Pt IV site with redness, swelling, pain with flushing reported by Pt. Pt due for IV abx per q4h order. Pt with x1 U/S guided IV due to difficulty of access.
Pt IV site with redness, swelling, pain with flushing reported by Pt. Pt due for IV abx per q4h order. Pt with x1 U/S guided IV due to difficulty of access.
Pt adm for back pain  Due for long term IV abx- refusing to stay and wants to leave AMA
Pt adm for chronic back pain.   Pt currently has no IV access.  Floor nurse tried 2x as well as ANM.
Pt admitted for sciatica/back pain, heroine user so no narcs, c/o10/10 back pain with no relief from toradol 15mg.
Pt has hypertensive BP of 156/103. Pt's HR is 66. Pt refused am dose of lisinopril, stating lisinopril gives him "migraine headaches".
Pt refused lisinopril  10mg , B/P elevated (see flow sheet) Pt requesting Vasotec
Unable to get iv line for patient in order for him to get his ABX. team notify an recommended patient getting an arrow. IV nurse Adamaris called but patient has to wait as she is prioritizing .
pt reports chest pain
Pt received in chair extremely lethargic and arousable only to repeated stimulation. Suspected in hospital drug use after receiving visitors and over an hour spent in the bathroom.
pt presents with /103

## 2019-09-06 NOTE — DISCHARGE NOTE NURSING/CASE MANAGEMENT/SOCIAL WORK - NSDCPEWEB_GEN_ALL_CORE
[facility] for Tobacco Control website --- [email or web address] --- [email or web address]

## 2019-09-06 NOTE — PROVIDER CONTACT NOTE (OTHER) - ASSESSMENT
Pt is complaining of pain near the iv. Redness and warmth is noted around the IV. Iv removed. I attempted once but was unsuccessful. Notified clinical impact nurse who came down and spoke the pt about placing another line , pt refused.

## 2019-09-06 NOTE — DISCHARGE NOTE NURSING/CASE MANAGEMENT/SOCIAL WORK - NSDCPEEMAIL_GEN_ALL_CORE
[facility] for Tobacco Control email [email or web address]

## 2019-09-06 NOTE — CHART NOTE - NSCHARTNOTEFT_GEN_A_CORE
Addiction Psychiatry consult requested. Awaiting response.
CT L spine reviewed - no abnormality. reconsult as needed     Case discussed with attending neurosurgeon.
Discussed with ADS team about patient, W.W. to have him evaluated by Addiction Psychiatry to evaluate if his increase opioid need is really due to addiction or pain.    Pain service recommends no increase in opioids till seen by Addiction Psychiatry.  Addiction Psychiatry contact information:  Alphonse Mike: [phone] or [phone] or [facility] [phone] or Pamela Sánchez [phone].
Neurosurgery consulted given MRI findings- will f/u recs.
Notified by RN that patient would like to speak with regarding pain medication. Patient is alert and oriented 4. Patient seen at bedside. Patient reports that his pain is not controlled with current pain medication. Patient reports that effect of pain medication wear off . Patient is currently on Percocet 2 tabs po Q6 hours and OxyContin 20 mg po twice a day. Discussed with attending Dr: Jennifer, who recommended to administer OxyContin 2 hour early. Pain management to be called in Am to reevaluate. Patient refusing IV antibiotics at this time despite encouragement ad explanation.
Notified by RN that pt c/o back pain. Pt seen at bedside. Describes pain as sharp 10/10 radiating to right thigh. Per patient the pain got worse after he took shower. No incontinence of bowel or bladder noted. Pt is unable to do full ROM to right leg secondary to pain. Full ROM to left leg. No tingling or numbness noted. Pt states no relief in pain after receiving 10 mg of oxycodone. Toradol 30mg and Decadron 4 mg x 1 ordered. Repeat MRI ordered.
Pain Service consulted- will f/u res.
Patient endorsed that he is still in pain and requesting additional recs from pain management - Called pain service and reviewed note from yesterday - No plan for additional Percocet, but added Nortriptyline - Patient was refusing IV ABX - I went to talk to patient and addressed concerns about pain and we spoke about the new medication, plus increase in Gabapentin and that Percocet will remain as is - Patient is now in agreement to take Nortriptyline and continue ABX and it is serious for his well-being - Inform about plan for NICHELLE and he understands that there is sedation needed for this test in order to visualize the back of the heart
Patient left AMA - As per pain management and psychiatry not recommending to send on any narcotics if leaving AMA - Explained the risk of withdrawal and N/V/D, shaking, dizziness, may ensue - Can report back to the ED - Spoke to infectious disease who does not clear patient for DC, so if leaving AMA can continue ABX course with Keflex and Doxy to complete 8 week course - Sent to [facility] - Patient understands the risks that oral ABX not as effective as IV and that the infection may recur. Patient understands that it is not in his best interest to leave against medical advice, but wishes to leave regardless - Wife present who also heard conversation - Nurse manager and nurse were present during AMA process - Dr. Rodriguez notified
Patient noted to be more lethargic last night and this morning - 2 episodes of transient confusion/lethargy hours after receiving his PRN narcotic - There is suspicion that patient received additional medications from family present last night - Security called and patient instructed to put hospital gown back on and allow security to search belongings - Initially patient refused and threatened to leave AMA - Printed papers and patient changed his mind before signing - Spoke to psych and pain service and patient is only to be discharged on oral ABX suggested by ID and NO narcotics - Patient will now stay. Plan is to leave pain regimen as is right now and decreased the Oxycontin to 10mg BID approximately 2 weeks from discharge date to eventually be discontinued all together and patient to be discharged on Percocet 10mg q 6 hours PRN. Will obtain serum toxicology and supervised family visitation at this time
Provider called by nurse manager. Patient Baltazar screaming in the hallway. Patient states " tramadol is not doing anything". Patient demanding toradol or higher dose of tramadol. Case discussed with Dr. Rodriguez. Instructed to give Toradol 30mg x 1 IV push. No increase of tramadol dose at this time. Pain management consult in Am.
Psych conslted- will f/u recs.
Pt c/o back pain requesting stronger pain meds. Pts creatinine trended up today to 1.79, Nephro following started IVF, will avoid NSAIDs. Had discussion with Pain management team who recs to have PT eval for TENs, ok to administer Tramadol 50mg po q6 PRN for now, will avoid narcotics. Will cont to monitor pts clinical status.
Pt c/o uncontrolled back pain. Pain mgmt already following. Discussed pain regimen with Pain NPEduardo. In addition to percocet 5/325 q6hrs prn, Recs as follows:   Tylenol 650 q6hrs standing x2 days  IV toradol 15 mg q8hrs satnding x2 days (Cr currently WNL, however was previously elevated therefore placed on IVF).  Tizanidine 4 mg standing q6 hrs x2 days then change back to PRN.  Discussed plan of care with patient and nurse manager at bedside. Pt in agreement.
Source: Patient [x] Medical record reviewed. Patient seen for length of stay nutrition follow-up. Patient endorses good appetite/PO intake. Tolerating diet. Drinking Ensure Enlive supplements daily (prefers Strawberry flavor only). No GI distress (nausea/vomiting/diarrhea/constipation) noted at this time.     Current Diet :Diet, Regular:   Supplement Feeding Modality:  Oral  Ensure Enlive Cans or Servings Per Day:  3       Frequency:  Three Times a day (08-04-19 @ 09:29)   PO intake:  < 50% [ ] 50-75% [ ]   % [x]  other :  Current Weight: no new weight recorded, 102.1kg (8/05)    __________________ Pertinent Medications__________________   MEDICATIONS  (STANDING):  chlorhexidine 4% Liquid 1 Application(s) Topical <User Schedule>  enalapril 20 milliGRAM(s) Oral every 12 hours  gabapentin 800 milliGRAM(s) Oral three times a day  hydrALAZINE 25 milliGRAM(s) Oral every 12 hours  lidocaine   Patch 1 Patch Transdermal daily  melatonin 6 milliGRAM(s) Oral at bedtime  multivitamin      multivitamin 1 Tablet(s) Oral daily  nafcillin  IVPB      nafcillin  IVPB 2 Gram(s) IV Intermittent every 4 hours  nicotine -  14 mG/24Hr(s) Patch 1 patch Transdermal daily  nortriptyline 25 milliGRAM(s) Oral at bedtime  oxyCODONE  ER Tablet 20 milliGRAM(s) Oral every 12 hours  pantoprazole    Tablet 40 milliGRAM(s) Oral before breakfast    MEDICATIONS  (PRN):  cloNIDine 0.1 milliGRAM(s) Oral two times a day PRN withdrawl symptoms  hydrOXYzine hydrochloride 50 milliGRAM(s) Oral every 8 hours PRN Anxiety  oxyCODONE    5 mG/acetaminophen 325 mG 2 Tablet(s) Oral every 6 hours PRN Severe Pain (7 - 10)  tiZANidine 4 milliGRAM(s) Oral every 6 hours PRN Muscle Spasm      __________________ Pertinent Labs__________________   08-28 Na139 mmol/L Glu 97 mg/dL K+ 5.0 mmol/L Cr  1.10 mg/dL BUN 23 mg/dL 08-28 Phos 3.1 mg/dL 08-28 Alb 2.9 g/dL<L>    Skin: No pressure ulcers/DTI noted in flowsheets.   Edema: none noted    Estimated Needs:   [x] no change since previous assessment  [ ] recalculated:     Previous Nutrition Diagnosis:   [x] Malnutrition, moderate  Nutrition Diagnosis is [x] ongoing - patient with improved appetite/PO intake in-house  [ ] resolved [ ] not applicable     Interventions:  1. Continue Regular diet.   2. Continue Ensure Enlive 240mls 3x daily (1050kcal, 60g protein).  3. Continue multivitamin.    Monitoring and Evaluation:  1. Patient to meet > 75% estimated pro/kcal needs.  RD to remain available, Zeny Du RD, CDN Pager #[phone]
Source: Patient [x] Medical record reviewed. Patient seen for length of stay nutrition follow-up. Patient reports good appetite/PO intake. Tolerating diet. No GI distress (nausea/vomiting/diarrhea/constipation) noted at this time.     Family [ ]     other [ ]    Current Diet : Diet, Regular:   Supplement Feeding Modality:  Oral  Ensure Enlive Cans or Servings Per Day:  3       Frequency:  Three Times a day (08-04-19 @ 09:29)  PO intake:  < 50% [ ] 50-75% [ ]   % [x]  other :  Current Weight: no new weight recorded, 102.1kg (8/05, admit)    __________________ Pertinent Medications__________________   MEDICATIONS  (STANDING):  enalapril 20 milliGRAM(s) Oral every 12 hours  gabapentin 800 milliGRAM(s) Oral three times a day  ketorolac   Injectable 30 milliGRAM(s) IV Push every 6 hours  lidocaine   Patch 1 Patch Transdermal daily  melatonin 6 milliGRAM(s) Oral at bedtime  multivitamin      multivitamin 1 Tablet(s) Oral daily  nafcillin  IVPB      nafcillin  IVPB 2 Gram(s) IV Intermittent every 4 hours  nortriptyline 25 milliGRAM(s) Oral at bedtime  pantoprazole    Tablet 40 milliGRAM(s) Oral before breakfast  sodium chloride 0.9%. 1000 milliLiter(s) (70 mL/Hr) IV Continuous <Continuous>    MEDICATIONS  (PRN):  cloNIDine 0.1 milliGRAM(s) Oral two times a day PRN withdrawl symptoms  hydrOXYzine hydrochloride 50 milliGRAM(s) Oral every 8 hours PRN Anxiety  oxyCODONE    5 mG/acetaminophen 325 mG 2 Tablet(s) Oral every 6 hours PRN Severe Pain (7 - 10)  tiZANidine 4 milliGRAM(s) Oral every 6 hours PRN Muscle Spasm      __________________ Pertinent Labs__________________   08-21 Na139 mmol/L Glu 145 mg/dL<H> K+ 4.5 mmol/L Cr  1.04 mg/dL BUN 23 mg/dL 08-21 Phos 4.1 mg/dL    Skin: No pressure ulcers/DTI noted in flowsheets.   Edema: None noted    Estimated Needs:   [x] no change since previous assessment  [ ] recalculated:     Previous Nutrition Diagnosis:   [x] Malnutrition, mild  Nutrition Diagnosis is [x] ongoing - improved PO intake/appetite [ ] resolved [ ] not applicable     Interventions:   1. Continue Regular diet.   2. Continue Ensure Enlive 240mls 3x daily (1050kcal, 60g protein).   3. Please Encourage po intake, assist with meals and menu selections, provide alternatives PRN.  4. Obtain current weight.     Monitoring and Evaluation:  1. Patient to meet > 75% estimated pro/kcal needs.   2. Tolerance to diet.  RD to remain available, Zeny Du RD, CDN Pager #[phone]
Spoke to Dr. Rodriguez re: Elevated BP. Pt reports he used to take vasotec 20 mg daily at home, he states this was refilled last at [facility] in [place]. However upon speaking with the local pharmacist at [facility] they have no records of this.     Will initiate norvasc 5 mg daily now and continue to trend BPs closely.
Spoke to patient in reference to toradol. Pt stated that all other pain medications do not work. He stated that he hasn't been taking toradol in excess. Creatinine improved and now wnl.     -toradol 15mg IV x 1 given  -pt agreed to take all other meds as well
Writer called by primary nurse. Patient is demanding IV fluids be stopped at this time. Patient states " I will just drink water".
Writer called by primary nurse. Patient threatening to remove Midline. Patient reports "IV hurts when medication pushed through it". Provider present at bedtime. Nurse aid seen holding guaze to patient's forearm. Patient already pulled out line. Primary nurse documented line as midline. Writer spoke with Dr. Tamayo. Patient had Ultrasound guided IV in place. New peripheral line placed. Nurse to continue IV antibiotics.
Writer was notified by RN that patient was found sitting in chair lethargic after family members' visitation.  Patient was seen and examined, arousable, able to answer simple questions but not at his baseline alertness.    VS stable, Utox, and Narcan 0.4mg IV ordered.
ok to add toradol x 1 more day as per pain mgmt np madelaine and then convert to motrin 800mg p.o. q 8hours for no mre than 5 days   explained to pt
oxycontin 20mg ER BID as per pain service recs d/w medical attending
Spoke to Dr. Rodriguez about today's conversations with pain service and psychiatry. Pain service requested addiction psychiatry to see first before pain team would see patient today. Spoke with Dr. Jacobsen who had already seen patient previously and there are no further recommendations - There is no way of truly determining whether patient's request to increase pain medications is from persistent pain or an addiction. I called pain service again for assistance as the patient specifically requested to speak to this team - Psychiatry, pain service, as well as, primary team (Including myself) are not comfortable with increasing the pain regimen further as this patient has an extensive drug abuse history - Spoke with neurosurgery earlier in admission and there was no intervention warranted besides continuing antibiotics - Patient is actively walking around the unit daily. Will not increase pain regimen further and as per psychiatry can use Haldol or Seroquel PRN if patient becomes agitated

## 2019-09-06 NOTE — DISCHARGE NOTE NURSING/CASE MANAGEMENT/SOCIAL WORK - PATIENT PORTAL LINK FT
You can access the FollowMyHealth Patient Portal offered by [facility] by registering at the following website: [email or web address]. By joining [email or web address] [email or web address] portal, you will also be able to view your health information using other applications (apps) compatible with our system.

## 2019-09-06 NOTE — CHART NOTE - NSCHARTNOTESELECT_GEN_ALL_CORE
Event Note
ADS NP/Event Note
AMA/Event Note
Event Note
Event Note/ADS
Event Note/Pain recs
Follow-Up/Nutrition Services
Nutrition Services/Follow-Up
neurosurg/Event Note

## 2019-09-06 NOTE — PROVIDER CONTACT NOTE (OTHER) - REASON
/106 electronic 144/100 manually
Compromised IV line; inability to administer IV abx
HTN
HTN, Pain
High blood pressure
No IV access unable to administer IV abx
Patient not easy to arouse
Patient refusing PIV change
Patient's BP is 148/114
Pt /98
Pt DBP elevated
Pt c/p pain without relief from medications ordered.
Pt is hypertensive
Pt refused hydralazine
Pt refused lisinopril  10mg , B/P elevated (see flow sheet) Pt requesting Vasotec
Pt requesting pain meds 20 mins early
Pt requesting to leave AMA
Pt's /104
Unable to get IV acess
Unable to get iv line
pt reports chest pain
change in behavior
pt presents with /103
Pt BP was 152/108 & headache pain of 10/10

## 2019-09-06 NOTE — PROGRESS NOTE ADULT - MINUTES
45
25
15
15
25
45

## 2019-09-06 NOTE — PROGRESS NOTE ADULT - REASON FOR ADMISSION
back pain and sciatica

## 2019-09-09 ENCOUNTER — EMERGENCY (EMERGENCY)
Facility: HOSPITAL | Age: 54
LOS: 1 days | Discharge: HOME | End: 2019-09-09
Attending: EMERGENCY MEDICINE
Payer: COMMERCIAL

## 2019-09-09 VITALS
HEART RATE: 88 BPM | RESPIRATION RATE: 20 BRPM | HEIGHT: 71 IN | SYSTOLIC BLOOD PRESSURE: 176 MMHG | WEIGHT: 225.09 LBS | OXYGEN SATURATION: 100 % | TEMPERATURE: 98 F | DIASTOLIC BLOOD PRESSURE: 124 MMHG

## 2019-09-09 DIAGNOSIS — Z98.890 OTHER SPECIFIED POSTPROCEDURAL STATES: Chronic | ICD-10-CM

## 2019-09-09 PROCEDURE — 99283 EMERGENCY DEPT VISIT LOW MDM: CPT

## 2019-09-09 RX ORDER — CEPHALEXIN 500 MG
500 CAPSULE ORAL ONCE
Refills: 0 | Status: COMPLETED | OUTPATIENT
Start: 2019-09-09 | End: 2019-09-09

## 2019-09-09 RX ORDER — METHOCARBAMOL 500 MG/1
750 TABLET, FILM COATED ORAL ONCE
Refills: 0 | Status: COMPLETED | OUTPATIENT
Start: 2019-09-09 | End: 2019-09-09

## 2019-09-09 RX ORDER — OXYCODONE AND ACETAMINOPHEN 5; 325 MG/1; MG/1
2 TABLET ORAL ONCE
Refills: 0 | Status: DISCONTINUED | OUTPATIENT
Start: 2019-09-09 | End: 2019-09-09

## 2019-09-09 RX ADMIN — Medication 100 MILLIGRAM(S): at 18:02

## 2019-09-09 RX ADMIN — METHOCARBAMOL 750 MILLIGRAM(S): 500 TABLET, FILM COATED ORAL at 17:56

## 2019-09-09 RX ADMIN — OXYCODONE AND ACETAMINOPHEN 2 TABLET(S): 5; 325 TABLET ORAL at 17:55

## 2019-09-09 RX ADMIN — Medication 500 MILLIGRAM(S): at 18:03

## 2019-09-09 NOTE — ED STATDOCS - OBJECTIVE STATEMENT
55 y/o M pt with significant PMHx of Osteomyelitis and Sciatica presents to the ED accompanied by [employer] c/o back pain. He reports that he had a recent 5 week stay in [facility] where he was diagnosed with Osteomyelitis in his spine; he was just discharged 3 days ago. He states that the osteomyelitis might be the result of IV drug abuse. Patient was given keflex and doxycycline during his stay at [facility] but states that when he was discharged, he was only given physical therapy instructions and no antibiotic prescription. He also reports that he has had RLE weakness since he was discharged 3 days ago, and has been ambulating with a cane. Denies fevers, chills. NKDA. No further acute complaints at this time.

## 2019-09-09 NOTE — ED STATDOCS - PATIENT PORTAL LINK FT
You can access the FollowMyHealth Patient Portal offered by [facility] by registering at the following website: [email or web address]. By joining [employer]’s FollowMyHealth portal, you will also be able to view your health information using other applications (apps) compatible with our system.

## 2019-09-09 NOTE — ED STATDOCS - CLINICAL SUMMARY MEDICAL DECISION MAKING FREE TEXT BOX
Patient with PMHx of Osteomyelitis and Sciatica presents with back pain. Will treat with pain meds and muscle relaxers. Pt FFC, and will continue with his medications.

## 2019-09-09 NOTE — ED STATDOCS - NS_ ATTENDINGSCRIBEDETAILS _ED_A_ED_FT
I, Angel Granados, performed the initial face to face bedside interview with this patient regarding history of present illness, review of symptoms and relevant past medical, social and family history.  I completed an independent physical examination.  I was the initial provider who evaluated this patient.   The history, relevant review of systems, past medical and surgical history, medical decision making, and physical examination was documented by the scribe in my presence and I attest to the accuracy of the documentation.
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
## 2019-09-10 PROBLEM — M54.30 SCIATICA, UNSPECIFIED SIDE: Chronic | Status: ACTIVE | Noted: 2019-08-04

## 2019-10-01 ENCOUNTER — OUTPATIENT (OUTPATIENT)
Dept: OUTPATIENT SERVICES | Facility: HOSPITAL | Age: 54
LOS: 1 days | End: 2019-10-01
Payer: MEDICAID

## 2019-10-01 DIAGNOSIS — Z98.890 OTHER SPECIFIED POSTPROCEDURAL STATES: Chronic | ICD-10-CM

## 2019-10-01 PROCEDURE — G9001: CPT

## 2019-10-04 DIAGNOSIS — Z71.89 OTHER SPECIFIED COUNSELING: ICD-10-CM

## 2020-06-19 ENCOUNTER — INPATIENT (INPATIENT)
Facility: HOSPITAL | Age: 55
LOS: 0 days | Discharge: HOME | DRG: 185 | End: 2020-06-20
Attending: SURGERY | Admitting: SURGERY
Payer: COMMERCIAL

## 2020-06-19 VITALS
OXYGEN SATURATION: 100 % | RESPIRATION RATE: 20 BRPM | HEART RATE: 92 BPM | DIASTOLIC BLOOD PRESSURE: 137 MMHG | SYSTOLIC BLOOD PRESSURE: 224 MMHG

## 2020-06-19 DIAGNOSIS — S22.39XA FRACTURE OF ONE RIB, UNSPECIFIED SIDE, INITIAL ENCOUNTER FOR CLOSED FRACTURE: ICD-10-CM

## 2020-06-19 DIAGNOSIS — Z98.890 OTHER SPECIFIED POSTPROCEDURAL STATES: Chronic | ICD-10-CM

## 2020-06-19 LAB
ALBUMIN SERPL ELPH-MCNC: 3.3 G/DL — SIGNIFICANT CHANGE UP (ref 3.3–5.2)
ALP SERPL-CCNC: 121 U/L — HIGH (ref 40–120)
ALT FLD-CCNC: 97 U/L — HIGH
ANION GAP SERPL CALC-SCNC: 10 MMOL/L — SIGNIFICANT CHANGE UP (ref 5–17)
APPEARANCE UR: CLEAR — SIGNIFICANT CHANGE UP
APTT BLD: 30.9 SEC — SIGNIFICANT CHANGE UP (ref 27.5–36.3)
AST SERPL-CCNC: 226 U/L — HIGH
BASOPHILS # BLD AUTO: 0.02 K/UL — SIGNIFICANT CHANGE UP (ref 0–0.2)
BASOPHILS NFR BLD AUTO: 0.4 % — SIGNIFICANT CHANGE UP (ref 0–2)
BILIRUB SERPL-MCNC: 0.4 MG/DL — SIGNIFICANT CHANGE UP (ref 0.4–2)
BILIRUB UR-MCNC: NEGATIVE — SIGNIFICANT CHANGE UP
BUN SERPL-MCNC: 14 MG/DL — SIGNIFICANT CHANGE UP (ref 8–20)
CALCIUM SERPL-MCNC: 9 MG/DL — SIGNIFICANT CHANGE UP (ref 8.6–10.2)
CHLORIDE SERPL-SCNC: 107 MMOL/L — SIGNIFICANT CHANGE UP (ref 98–107)
CO2 SERPL-SCNC: 26 MMOL/L — SIGNIFICANT CHANGE UP (ref 22–29)
COLOR SPEC: YELLOW — SIGNIFICANT CHANGE UP
CREAT SERPL-MCNC: 1.07 MG/DL — SIGNIFICANT CHANGE UP (ref 0.5–1.3)
DIFF PNL FLD: ABNORMAL
EOSINOPHIL # BLD AUTO: 0.04 K/UL — SIGNIFICANT CHANGE UP (ref 0–0.5)
EOSINOPHIL NFR BLD AUTO: 0.8 % — SIGNIFICANT CHANGE UP (ref 0–6)
EPI CELLS # UR: SIGNIFICANT CHANGE UP
ETHANOL SERPL-MCNC: 77 MG/DL — SIGNIFICANT CHANGE UP
GLUCOSE SERPL-MCNC: 105 MG/DL — HIGH (ref 70–99)
GLUCOSE UR QL: NEGATIVE MG/DL — SIGNIFICANT CHANGE UP
HCT VFR BLD CALC: 47.5 % — SIGNIFICANT CHANGE UP (ref 39–50)
HGB BLD-MCNC: 15.4 G/DL — SIGNIFICANT CHANGE UP (ref 13–17)
IMM GRANULOCYTES NFR BLD AUTO: 0.8 % — SIGNIFICANT CHANGE UP (ref 0–1.5)
INR BLD: 0.93 RATIO — SIGNIFICANT CHANGE UP (ref 0.88–1.16)
KETONES UR-MCNC: NEGATIVE — SIGNIFICANT CHANGE UP
LACTATE BLDV-MCNC: 1.4 MMOL/L — SIGNIFICANT CHANGE UP (ref 0.5–2)
LEUKOCYTE ESTERASE UR-ACNC: NEGATIVE — SIGNIFICANT CHANGE UP
LIDOCAIN IGE QN: 39 U/L — SIGNIFICANT CHANGE UP (ref 22–51)
LYMPHOCYTES # BLD AUTO: 1.89 K/UL — SIGNIFICANT CHANGE UP (ref 1–3.3)
LYMPHOCYTES # BLD AUTO: 38 % — SIGNIFICANT CHANGE UP (ref 13–44)
MCHC RBC-ENTMCNC: 31.8 PG — SIGNIFICANT CHANGE UP (ref 27–34)
MCHC RBC-ENTMCNC: 32.4 GM/DL — SIGNIFICANT CHANGE UP (ref 32–36)
MCV RBC AUTO: 98.1 FL — SIGNIFICANT CHANGE UP (ref 80–100)
MONOCYTES # BLD AUTO: 0.42 K/UL — SIGNIFICANT CHANGE UP (ref 0–0.9)
MONOCYTES NFR BLD AUTO: 8.4 % — SIGNIFICANT CHANGE UP (ref 2–14)
NEUTROPHILS # BLD AUTO: 2.57 K/UL — SIGNIFICANT CHANGE UP (ref 1.8–7.4)
NEUTROPHILS NFR BLD AUTO: 51.6 % — SIGNIFICANT CHANGE UP (ref 43–77)
NITRITE UR-MCNC: NEGATIVE — SIGNIFICANT CHANGE UP
PH UR: 6.5 — SIGNIFICANT CHANGE UP (ref 5–8)
PLATELET # BLD AUTO: 190 K/UL — SIGNIFICANT CHANGE UP (ref 150–400)
POTASSIUM SERPL-MCNC: 4 MMOL/L — SIGNIFICANT CHANGE UP (ref 3.5–5.3)
POTASSIUM SERPL-SCNC: 4 MMOL/L — SIGNIFICANT CHANGE UP (ref 3.5–5.3)
PROT SERPL-MCNC: 6.4 G/DL — LOW (ref 6.6–8.7)
PROT UR-MCNC: 100 MG/DL
PROTHROM AB SERPL-ACNC: 10.5 SEC — SIGNIFICANT CHANGE UP (ref 10–12.9)
RBC # BLD: 4.84 M/UL — SIGNIFICANT CHANGE UP (ref 4.2–5.8)
RBC # FLD: 15 % — HIGH (ref 10.3–14.5)
RBC CASTS # UR COMP ASSIST: ABNORMAL /HPF (ref 0–4)
SODIUM SERPL-SCNC: 143 MMOL/L — SIGNIFICANT CHANGE UP (ref 135–145)
SP GR SPEC: 1.01 — SIGNIFICANT CHANGE UP (ref 1.01–1.02)
UROBILINOGEN FLD QL: 8 MG/DL
WBC # BLD: 4.98 K/UL — SIGNIFICANT CHANGE UP (ref 3.8–10.5)
WBC # FLD AUTO: 4.98 K/UL — SIGNIFICANT CHANGE UP (ref 3.8–10.5)
WBC UR QL: NEGATIVE — SIGNIFICANT CHANGE UP

## 2020-06-19 PROCEDURE — 93010 ELECTROCARDIOGRAM REPORT: CPT

## 2020-06-19 PROCEDURE — 73130 X-RAY EXAM OF HAND: CPT | Mod: 26,RT

## 2020-06-19 PROCEDURE — 72128 CT CHEST SPINE W/O DYE: CPT | Mod: 26

## 2020-06-19 PROCEDURE — 72131 CT LUMBAR SPINE W/O DYE: CPT | Mod: 26

## 2020-06-19 PROCEDURE — 73090 X-RAY EXAM OF FOREARM: CPT | Mod: 26,LT

## 2020-06-19 PROCEDURE — 71045 X-RAY EXAM CHEST 1 VIEW: CPT | Mod: 26

## 2020-06-19 PROCEDURE — 71260 CT THORAX DX C+: CPT | Mod: 26

## 2020-06-19 PROCEDURE — 72125 CT NECK SPINE W/O DYE: CPT | Mod: 26

## 2020-06-19 PROCEDURE — 74177 CT ABD & PELVIS W/CONTRAST: CPT | Mod: 26

## 2020-06-19 PROCEDURE — 70450 CT HEAD/BRAIN W/O DYE: CPT | Mod: 26

## 2020-06-19 PROCEDURE — 99221 1ST HOSP IP/OBS SF/LOW 40: CPT

## 2020-06-19 PROCEDURE — 73110 X-RAY EXAM OF WRIST: CPT | Mod: 26,RT

## 2020-06-19 PROCEDURE — 73060 X-RAY EXAM OF HUMERUS: CPT | Mod: 26,RT

## 2020-06-19 RX ORDER — CEFAZOLIN SODIUM 1 G
2000 VIAL (EA) INJECTION ONCE
Refills: 0 | Status: COMPLETED | OUTPATIENT
Start: 2020-06-19 | End: 2020-06-19

## 2020-06-19 RX ORDER — FENTANYL CITRATE 50 UG/ML
100 INJECTION INTRAVENOUS ONCE
Refills: 0 | Status: DISCONTINUED | OUTPATIENT
Start: 2020-06-19 | End: 2020-06-19

## 2020-06-19 RX ORDER — TRAMADOL HYDROCHLORIDE 50 MG/1
25 TABLET ORAL EVERY 4 HOURS
Refills: 0 | Status: DISCONTINUED | OUTPATIENT
Start: 2020-06-19 | End: 2020-06-20

## 2020-06-19 RX ORDER — GABAPENTIN 400 MG/1
300 CAPSULE ORAL EVERY 8 HOURS
Refills: 0 | Status: DISCONTINUED | OUTPATIENT
Start: 2020-06-19 | End: 2020-06-20

## 2020-06-19 RX ORDER — METHOCARBAMOL 500 MG/1
750 TABLET, FILM COATED ORAL EVERY 8 HOURS
Refills: 0 | Status: DISCONTINUED | OUTPATIENT
Start: 2020-06-19 | End: 2020-06-20

## 2020-06-19 RX ORDER — TETANUS TOXOID, REDUCED DIPHTHERIA TOXOID AND ACELLULAR PERTUSSIS VACCINE, ADSORBED 5; 2.5; 8; 8; 2.5 [IU]/.5ML; [IU]/.5ML; UG/.5ML; UG/.5ML; UG/.5ML
0.5 SUSPENSION INTRAMUSCULAR ONCE
Refills: 0 | Status: COMPLETED | OUTPATIENT
Start: 2020-06-19 | End: 2020-06-19

## 2020-06-19 RX ORDER — HYDROMORPHONE HYDROCHLORIDE 2 MG/ML
0.5 INJECTION INTRAMUSCULAR; INTRAVENOUS; SUBCUTANEOUS EVERY 4 HOURS
Refills: 0 | Status: DISCONTINUED | OUTPATIENT
Start: 2020-06-19 | End: 2020-06-20

## 2020-06-19 RX ORDER — SODIUM CHLORIDE 9 MG/ML
2000 INJECTION, SOLUTION INTRAVENOUS ONCE
Refills: 0 | Status: COMPLETED | OUTPATIENT
Start: 2020-06-19 | End: 2020-06-19

## 2020-06-19 RX ORDER — ONDANSETRON 8 MG/1
4 TABLET, FILM COATED ORAL ONCE
Refills: 0 | Status: COMPLETED | OUTPATIENT
Start: 2020-06-19 | End: 2020-06-19

## 2020-06-19 RX ORDER — ACETAMINOPHEN 500 MG
650 TABLET ORAL EVERY 6 HOURS
Refills: 0 | Status: DISCONTINUED | OUTPATIENT
Start: 2020-06-19 | End: 2020-06-20

## 2020-06-19 RX ORDER — ENOXAPARIN SODIUM 100 MG/ML
30 INJECTION SUBCUTANEOUS EVERY 12 HOURS
Refills: 0 | Status: DISCONTINUED | OUTPATIENT
Start: 2020-06-19 | End: 2020-06-20

## 2020-06-19 RX ORDER — SENNA PLUS 8.6 MG/1
2 TABLET ORAL AT BEDTIME
Refills: 0 | Status: DISCONTINUED | OUTPATIENT
Start: 2020-06-19 | End: 2020-06-20

## 2020-06-19 RX ORDER — CELECOXIB 200 MG/1
200 CAPSULE ORAL EVERY 12 HOURS
Refills: 0 | Status: DISCONTINUED | OUTPATIENT
Start: 2020-06-19 | End: 2020-06-20

## 2020-06-19 RX ORDER — TRAMADOL HYDROCHLORIDE 50 MG/1
50 TABLET ORAL EVERY 4 HOURS
Refills: 0 | Status: DISCONTINUED | OUTPATIENT
Start: 2020-06-19 | End: 2020-06-20

## 2020-06-19 RX ORDER — LIDOCAINE 4 G/100G
1 CREAM TOPICAL EVERY 24 HOURS
Refills: 0 | Status: DISCONTINUED | OUTPATIENT
Start: 2020-06-19 | End: 2020-06-20

## 2020-06-19 RX ADMIN — SODIUM CHLORIDE 1000 MILLILITER(S): 9 INJECTION, SOLUTION INTRAVENOUS at 22:06

## 2020-06-19 RX ADMIN — TETANUS TOXOID, REDUCED DIPHTHERIA TOXOID AND ACELLULAR PERTUSSIS VACCINE, ADSORBED 0.5 MILLILITER(S): 5; 2.5; 8; 8; 2.5 SUSPENSION INTRAMUSCULAR at 22:05

## 2020-06-19 RX ADMIN — FENTANYL CITRATE 100 MICROGRAM(S): 50 INJECTION INTRAVENOUS at 21:39

## 2020-06-19 RX ADMIN — FENTANYL CITRATE 100 MICROGRAM(S): 50 INJECTION INTRAVENOUS at 22:06

## 2020-06-19 RX ADMIN — Medication 1.25 MILLIGRAM(S): at 22:50

## 2020-06-19 RX ADMIN — Medication 100 MILLIGRAM(S): at 21:15

## 2020-06-19 RX ADMIN — Medication 0.2 MILLIGRAM(S): at 23:42
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
## 2020-06-19 NOTE — ED ADULT NURSE REASSESSMENT NOTE - NS ED NURSE REASSESS COMMENT FT1
Assumed pt care @ 8720. Pt laying on stretcher, soft c-collar in place, NAD noted, respirations even and nonlabored.
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
## 2020-06-19 NOTE — ED PROVIDER NOTE - PHYSICAL EXAMINATION
b/l breath sounds present  pupils 3mm b/l, equal and reactive  b/l central pulses intact  b/l distal pulses intact  chest wall tenderness  abd soft, nontender  pelvis stable  no obvious deformities to b/l LE. abrasions to the R knee, multiple abrasions to R hand  GCS 15 b/l breath sounds present  pupils 3mm b/l, equal and reactive  b/l central pulses intact  b/l distal pulses intact  chest wall tenderness  abd soft, nontender  pelvis stable  no obvious deformities to b/l LE. abrasions to the R knee, multiple abrasions to R hand  tenderness to T and L spine, no stepoff  GCS 15 General: NAD, Lying in bed comfortably  Neuro: A+Ox3  HEENT: NC/AT, EOMI, pupils 3mm b/l, equal and reactive  Neck: Soft, supple  Cardio: RRR, nml S1/S2  Resp: Good effort, CTA b/l  Thorax: +chest wall tenderness  GI/Abd: Soft, NT/ND, no rebound/guarding, no masses palpated  Vascular: All 4 extremities warm.  Skin: Intact, no breakdown  Pelvis: stable  Musculoskeletal: All 4 extremities moving spontaneously, no limitations, + T and L spinal tenderness, no stepoffs  no obvious deformities to b/l LE. abrasions to the R knee, multiple abrasions to R hand
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
## 2020-06-20 VITALS
HEART RATE: 71 BPM | TEMPERATURE: 98 F | SYSTOLIC BLOOD PRESSURE: 168 MMHG | RESPIRATION RATE: 19 BRPM | DIASTOLIC BLOOD PRESSURE: 98 MMHG | OXYGEN SATURATION: 99 %

## 2020-06-20 DIAGNOSIS — S27.329A CONTUSION OF LUNG, UNSPECIFIED, INITIAL ENCOUNTER: ICD-10-CM

## 2020-06-20 LAB
ANION GAP SERPL CALC-SCNC: 10 MMOL/L — SIGNIFICANT CHANGE UP (ref 5–17)
BASOPHILS # BLD AUTO: 0.03 K/UL — SIGNIFICANT CHANGE UP (ref 0–0.2)
BASOPHILS NFR BLD AUTO: 0.4 % — SIGNIFICANT CHANGE UP (ref 0–2)
BUN SERPL-MCNC: 12 MG/DL — SIGNIFICANT CHANGE UP (ref 8–20)
CALCIUM SERPL-MCNC: 8.9 MG/DL — SIGNIFICANT CHANGE UP (ref 8.6–10.2)
CHLORIDE SERPL-SCNC: 105 MMOL/L — SIGNIFICANT CHANGE UP (ref 98–107)
CO2 SERPL-SCNC: 25 MMOL/L — SIGNIFICANT CHANGE UP (ref 22–29)
CREAT SERPL-MCNC: 0.94 MG/DL — SIGNIFICANT CHANGE UP (ref 0.5–1.3)
EOSINOPHIL # BLD AUTO: 0.03 K/UL — SIGNIFICANT CHANGE UP (ref 0–0.5)
EOSINOPHIL NFR BLD AUTO: 0.4 % — SIGNIFICANT CHANGE UP (ref 0–6)
GLUCOSE SERPL-MCNC: 114 MG/DL — HIGH (ref 70–99)
HCT VFR BLD CALC: 44 % — SIGNIFICANT CHANGE UP (ref 39–50)
HGB BLD-MCNC: 14.6 G/DL — SIGNIFICANT CHANGE UP (ref 13–17)
IMM GRANULOCYTES NFR BLD AUTO: 0.3 % — SIGNIFICANT CHANGE UP (ref 0–1.5)
LYMPHOCYTES # BLD AUTO: 1.59 K/UL — SIGNIFICANT CHANGE UP (ref 1–3.3)
LYMPHOCYTES # BLD AUTO: 20.5 % — SIGNIFICANT CHANGE UP (ref 13–44)
MAGNESIUM SERPL-MCNC: 1.8 MG/DL — SIGNIFICANT CHANGE UP (ref 1.6–2.6)
MCHC RBC-ENTMCNC: 31.7 PG — SIGNIFICANT CHANGE UP (ref 27–34)
MCHC RBC-ENTMCNC: 33.2 GM/DL — SIGNIFICANT CHANGE UP (ref 32–36)
MCV RBC AUTO: 95.4 FL — SIGNIFICANT CHANGE UP (ref 80–100)
MONOCYTES # BLD AUTO: 0.67 K/UL — SIGNIFICANT CHANGE UP (ref 0–0.9)
MONOCYTES NFR BLD AUTO: 8.7 % — SIGNIFICANT CHANGE UP (ref 2–14)
NEUTROPHILS # BLD AUTO: 5.4 K/UL — SIGNIFICANT CHANGE UP (ref 1.8–7.4)
NEUTROPHILS NFR BLD AUTO: 69.7 % — SIGNIFICANT CHANGE UP (ref 43–77)
PHOSPHATE SERPL-MCNC: 2.8 MG/DL — SIGNIFICANT CHANGE UP (ref 2.4–4.7)
PLATELET # BLD AUTO: 194 K/UL — SIGNIFICANT CHANGE UP (ref 150–400)
POTASSIUM SERPL-MCNC: 4.1 MMOL/L — SIGNIFICANT CHANGE UP (ref 3.5–5.3)
POTASSIUM SERPL-SCNC: 4.1 MMOL/L — SIGNIFICANT CHANGE UP (ref 3.5–5.3)
RBC # BLD: 4.61 M/UL — SIGNIFICANT CHANGE UP (ref 4.2–5.8)
RBC # FLD: 14.9 % — HIGH (ref 10.3–14.5)
SARS-COV-2 RNA SPEC QL NAA+PROBE: SIGNIFICANT CHANGE UP
SODIUM SERPL-SCNC: 140 MMOL/L — SIGNIFICANT CHANGE UP (ref 135–145)
WBC # BLD: 7.74 K/UL — SIGNIFICANT CHANGE UP (ref 3.8–10.5)
WBC # FLD AUTO: 7.74 K/UL — SIGNIFICANT CHANGE UP (ref 3.8–10.5)

## 2020-06-20 PROCEDURE — 80048 BASIC METABOLIC PNL TOTAL CA: CPT

## 2020-06-20 PROCEDURE — 93005 ELECTROCARDIOGRAM TRACING: CPT

## 2020-06-20 PROCEDURE — 83690 ASSAY OF LIPASE: CPT

## 2020-06-20 PROCEDURE — 72125 CT NECK SPINE W/O DYE: CPT

## 2020-06-20 PROCEDURE — 85730 THROMBOPLASTIN TIME PARTIAL: CPT

## 2020-06-20 PROCEDURE — 71260 CT THORAX DX C+: CPT

## 2020-06-20 PROCEDURE — 96374 THER/PROPH/DIAG INJ IV PUSH: CPT | Mod: XU

## 2020-06-20 PROCEDURE — 99291 CRITICAL CARE FIRST HOUR: CPT | Mod: 25

## 2020-06-20 PROCEDURE — 83605 ASSAY OF LACTIC ACID: CPT

## 2020-06-20 PROCEDURE — 85610 PROTHROMBIN TIME: CPT

## 2020-06-20 PROCEDURE — 36415 COLL VENOUS BLD VENIPUNCTURE: CPT

## 2020-06-20 PROCEDURE — 71045 X-RAY EXAM CHEST 1 VIEW: CPT

## 2020-06-20 PROCEDURE — 80307 DRUG TEST PRSMV CHEM ANLYZR: CPT

## 2020-06-20 PROCEDURE — 73090 X-RAY EXAM OF FOREARM: CPT

## 2020-06-20 PROCEDURE — 70450 CT HEAD/BRAIN W/O DYE: CPT

## 2020-06-20 PROCEDURE — 90471 IMMUNIZATION ADMIN: CPT

## 2020-06-20 PROCEDURE — 81001 URINALYSIS AUTO W/SCOPE: CPT

## 2020-06-20 PROCEDURE — 84100 ASSAY OF PHOSPHORUS: CPT

## 2020-06-20 PROCEDURE — 99221 1ST HOSP IP/OBS SF/LOW 40: CPT

## 2020-06-20 PROCEDURE — 83735 ASSAY OF MAGNESIUM: CPT

## 2020-06-20 PROCEDURE — G0390: CPT

## 2020-06-20 PROCEDURE — 96375 TX/PRO/DX INJ NEW DRUG ADDON: CPT | Mod: XU

## 2020-06-20 PROCEDURE — 86803 HEPATITIS C AB TEST: CPT

## 2020-06-20 PROCEDURE — 80053 COMPREHEN METABOLIC PANEL: CPT

## 2020-06-20 PROCEDURE — 73130 X-RAY EXAM OF HAND: CPT

## 2020-06-20 PROCEDURE — 96376 TX/PRO/DX INJ SAME DRUG ADON: CPT

## 2020-06-20 PROCEDURE — 90715 TDAP VACCINE 7 YRS/> IM: CPT

## 2020-06-20 PROCEDURE — 74177 CT ABD & PELVIS W/CONTRAST: CPT

## 2020-06-20 PROCEDURE — 87521 HEPATITIS C PROBE&RVRS TRNSC: CPT

## 2020-06-20 PROCEDURE — 73060 X-RAY EXAM OF HUMERUS: CPT

## 2020-06-20 PROCEDURE — 99232 SBSQ HOSP IP/OBS MODERATE 35: CPT | Mod: GC

## 2020-06-20 PROCEDURE — 73130 X-RAY EXAM OF HAND: CPT | Mod: 26,LT

## 2020-06-20 PROCEDURE — 87635 SARS-COV-2 COVID-19 AMP PRB: CPT

## 2020-06-20 PROCEDURE — 85027 COMPLETE CBC AUTOMATED: CPT

## 2020-06-20 PROCEDURE — 73110 X-RAY EXAM OF WRIST: CPT

## 2020-06-20 RX ORDER — HYDRALAZINE HCL 50 MG
10 TABLET ORAL ONCE
Refills: 0 | Status: COMPLETED | OUTPATIENT
Start: 2020-06-20 | End: 2020-06-20

## 2020-06-20 RX ADMIN — GABAPENTIN 300 MILLIGRAM(S): 400 CAPSULE ORAL at 00:27

## 2020-06-20 RX ADMIN — CELECOXIB 200 MILLIGRAM(S): 200 CAPSULE ORAL at 00:27

## 2020-06-20 RX ADMIN — Medication 650 MILLIGRAM(S): at 00:27

## 2020-06-20 RX ADMIN — METHOCARBAMOL 750 MILLIGRAM(S): 500 TABLET, FILM COATED ORAL at 00:27

## 2020-06-20 RX ADMIN — CELECOXIB 200 MILLIGRAM(S): 200 CAPSULE ORAL at 17:13

## 2020-06-20 RX ADMIN — ENOXAPARIN SODIUM 30 MILLIGRAM(S): 100 INJECTION SUBCUTANEOUS at 17:13

## 2020-06-20 RX ADMIN — Medication 650 MILLIGRAM(S): at 05:08

## 2020-06-20 RX ADMIN — Medication 650 MILLIGRAM(S): at 17:13

## 2020-06-20 RX ADMIN — LIDOCAINE 1 PATCH: 4 CREAM TOPICAL at 00:46

## 2020-06-20 RX ADMIN — Medication 10 MILLIGRAM(S): at 03:31

## 2020-06-20 RX ADMIN — METHOCARBAMOL 750 MILLIGRAM(S): 500 TABLET, FILM COATED ORAL at 05:09

## 2020-06-20 RX ADMIN — Medication 650 MILLIGRAM(S): at 12:22

## 2020-06-20 RX ADMIN — LIDOCAINE 1 PATCH: 4 CREAM TOPICAL at 12:25

## 2020-06-20 RX ADMIN — GABAPENTIN 300 MILLIGRAM(S): 400 CAPSULE ORAL at 05:08

## 2020-06-20 RX ADMIN — ENOXAPARIN SODIUM 30 MILLIGRAM(S): 100 INJECTION SUBCUTANEOUS at 05:08

## 2020-06-20 RX ADMIN — METHOCARBAMOL 750 MILLIGRAM(S): 500 TABLET, FILM COATED ORAL at 12:24

## 2020-06-20 RX ADMIN — CELECOXIB 200 MILLIGRAM(S): 200 CAPSULE ORAL at 05:08

## 2020-06-20 RX ADMIN — GABAPENTIN 300 MILLIGRAM(S): 400 CAPSULE ORAL at 12:21
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
## 2020-06-20 NOTE — DISCHARGE NOTE PROVIDER - CARE PROVIDER_API CALL
Jennifer Fletcher)  Orthopedics  [address] [place] [address], [address]  [place] [address]  Phone: [phone]  Fax: [phone]  Follow Up Time: 2 weeks
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
## 2020-06-20 NOTE — DISCHARGE NOTE PROVIDER - NSFOLLOWUPCLINICS_GEN_ALL_ED_FT
[facility] Trauma Surgery  Trauma Surgery  [address] [place] [address]  Phone: [phone]  Fax:   Follow Up Time:
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
## 2020-06-20 NOTE — PROGRESS NOTE ADULT - SUBJECTIVE AND OBJECTIVE BOX
TERTIARY TRAUMA SURVEY    Patient reports pain to right flank along with lower back pain limiting his ability to lift his legs.     Vital Signs Last 24 Hrs  T(C): --  T(F): --  HR: 92 (2020 22:05) (92 - 92)  BP: 224/137 (2020 22:05) (224/137 - 224/137)  BP(mean): --  RR: 20 (2020 22:05) (20 - 20)  SpO2: 100% (2020 22:05) (100% - 100%)    I&O's Detail        Constitutional: Well-developed well nourished Male in acute distress  	HEENT: Head is normocephalic and atraumatic, maxillofacial structures stable, no blood or discharge from nares or oral cavity, no yo sign / racoon eyes, EOMI b/l, pupils [3]mm round and reactive to light b/l, no active drainage or redness  	Neck: cervical collar in place, trachea midline  	Respiratory: Breath sounds CTA b/l respirations, no accessory muscle use, no conversational dyspnea  	Cardiovascular: Regular rate & rhythm, +S1, S1, Chest wall is non-tender to palpation, no subQ emphysema or crepitus palpated  	Gastrointestinal: Abdomen soft, non-tender, non-distended, no rebound tenderness / guarding, no ecchymosis or external signs of abdominal trauma  	Pelvis: stable  	Vascular: 2+ radial, femoral, and DP pulses b/l  	Neurological: GCS: 15 (4/5/6). A&O x 3; no gross sensory / motor / coordination deficits  	Musculoskeletal: tenderness to right flank, b/l LE sensory motor intact, but ROM limited due to back pain  Neuropsinal: T/L spine tenderness to palpation, no step-offs or signs of external trauma to the back    LABS:                        15.4   4.98  )-----------( 190      ( 2020 20:55 )             47.5     06-19    143  |  107  |  14.0  ----------------------------<  105<H>  4.0   |  26.0  |  1.07    Ca    9.0      2020 20:55    TPro  6.4<L>  /  Alb  3.3  /  TBili  0.4  /  DBili  x   /  AST  226<H>  /  ALT  97<H>  /  AlkPhos  121<H>  06-19    PT/INR - ( 2020 20:55 )   PT: 10.5 sec;   INR: 0.93 ratio         PTT - ( 2020 20:55 )  PTT:30.9 sec  Urinalysis Basic - ( 2020 22:10 )    Color: Yellow / Appearance: Clear / S.015 / pH: x  Gluc: x / Ketone: Negative  / Bili: Negative / Urobili: 8 mg/dL   Blood: x / Protein: 100 mg/dL / Nitrite: Negative   Leuk Esterase: Negative / RBC: 3-5 /HPF / WBC Negative   Sq Epi: x / Non Sq Epi: Occasional / Bacteria: x        MEDICATIONS  (STANDING):  acetaminophen   Tablet .. 650 milliGRAM(s) Oral every 6 hours  celecoxib 200 milliGRAM(s) Oral every 12 hours  enoxaparin Injectable 30 milliGRAM(s) SubCutaneous every 12 hours  gabapentin 300 milliGRAM(s) Oral every 8 hours  lidocaine   Patch 1 Patch Transdermal every 24 hours  methocarbamol 750 milliGRAM(s) Oral every 8 hours  senna 2 Tablet(s) Oral at bedtime    MEDICATIONS  (PRN):  HYDROmorphone  Injectable 0.5 milliGRAM(s) IV Push every 4 hours PRN breakthrough pain  traMADol 25 milliGRAM(s) Oral every 4 hours PRN Moderate Pain (4 - 6)  traMADol 50 milliGRAM(s) Oral every 4 hours PRN Severe Pain (7 - 10)
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
## 2020-06-20 NOTE — DISCHARGE NOTE NURSING/CASE MANAGEMENT/SOCIAL WORK - PATIENT PORTAL LINK FT
You can access the FollowMyHealth Patient Portal offered by [facility] by registering at the following website: [email or web address]. By joining [employer]’s FollowMyHealth portal, you will also be able to view your health information using other applications (apps) compatible with our system.
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
## 2020-06-20 NOTE — DISCHARGE NOTE PROVIDER - NSDCCPCAREPLAN_GEN_ALL_CORE_FT
PRINCIPAL DISCHARGE DIAGNOSIS  Diagnosis: Rib fractures  Assessment and Plan of Treatment: - follow up in Trauma Surgery Clinic in 2 weeks  - for pain, please take over the counter tylenol and ibuprofen as needed. Please use over the counter lidocaine patches as instructed on packaging  - Okay to resume diet, activities, and work as tolerated.  - resume home medications      SECONDARY DISCHARGE DIAGNOSES  Diagnosis: Fracture, metacarpal  Assessment and Plan of Treatment: - follow up in orthopedic/hand surgery clinic in 2 weeks    Diagnosis: Pulmonary contusion  Assessment and Plan of Treatment:

## 2020-06-21 LAB
HCV AB S/CO SERPL IA: 8.61 S/CO — HIGH (ref 0–0.99)
HCV AB SERPL-IMP: REACTIVE

## 2020-07-02 LAB
HCV RNA FLD QL NAA+PROBE: SIGNIFICANT CHANGE UP
HCV RNA SPEC QL PROBE+SIG AMP: DETECTED

## 2020-08-18 NOTE — BEHAVIORAL HEALTH ASSESSMENT NOTE - BODY HABITUS
lisdexamfetamine (VYVANSE) 30 MG capsule Take 1 capsule by mouth every morning last rx 7/10/20 last dispensed 7/10/20 per pharmacy     Pending 10/27/20 apptment   Last apptment 6/19/20     Routed to covering provider to approve or deny   
Well nourished

## 2021-02-03 ENCOUNTER — OUTPATIENT (OUTPATIENT)
Dept: OUTPATIENT SERVICES | Facility: HOSPITAL | Age: 56
LOS: 1 days | End: 2021-02-03
Payer: OTHER GOVERNMENT

## 2021-02-03 DIAGNOSIS — Z98.890 OTHER SPECIFIED POSTPROCEDURAL STATES: Chronic | ICD-10-CM

## 2021-02-03 PROCEDURE — 74183 MRI ABD W/O CNTR FLWD CNTR: CPT | Mod: 26

## 2023-04-20 NOTE — ED ADULT TRIAGE NOTE - NS ED NOTE AC HIGH RISK COUNTRIES
4/20/2023        Janes Santana   [address]  [facility] [address]      RE:  Complete Pulmonary Function Test        Dear Janes,     It looks like you missed your scheduled appointment on 04/12/23 for a Complete Pulmonary Function Test.    At your earliest convenience, please contact us at your earliest convenience if you wish to reschedule.     Sincerely,      [facility] Scheduling  [phone]  [facility] AMBULATORY [facility]   [address] [facility] [address]  [phone]    
No

## 2023-09-19 NOTE — ED PROVIDER NOTE - CHIEF COMPLAINT
Called to discuss sugar log and Dr Kimbrough recommendation of insulin twice daily. Levemir 10u BID    Verbalized understanding. Will take in addition to metformin.    The patient is a 54y Male complaining of pain, low back.
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
## 2025-05-26 NOTE — PROGRESS NOTE ADULT - SUBJECTIVE AND OBJECTIVE BOX
[facility]   MD JEYSON GALAN D.O, PA    TELEPHONE NUMBERS:  OFFICE: [phone]  DR. FORREST CELL: [phone]  MARIUSZ MUÑOZ CELL: [phone]  DR. OCHOA CELL:  [phone]    RENAL FOLLOW UP NOTE  --------------------------------------------------------------------------------  HPI: Pt seen and examined at bedside.   Cj TAY, chest pain     PAST HISTORY  --------------------------------------------------------------------------------  No significant changes to PMH, PSH, FHx, SHx, unless otherwise noted    ALLERGIES & MEDICATIONS  --------------------------------------------------------------------------------  Allergies    No Known Allergies    Intolerances      Standing Inpatient Medications  enalapril 20 milliGRAM(s) Oral every 12 hours  gabapentin 800 milliGRAM(s) Oral three times a day  hydrALAZINE 25 milliGRAM(s) Oral three times a day  lidocaine   Patch 1 Patch Transdermal daily  melatonin 6 milliGRAM(s) Oral at bedtime  multivitamin      multivitamin 1 Tablet(s) Oral daily  nafcillin  IVPB      nafcillin  IVPB 2 Gram(s) IV Intermittent every 4 hours  nicotine -  14 mG/24Hr(s) Patch 1 patch Transdermal daily  nortriptyline 25 milliGRAM(s) Oral at bedtime  nystatin Powder 1 Application(s) Topical two times a day  oxyCODONE  ER Tablet 20 milliGRAM(s) Oral every 12 hours  pantoprazole    Tablet 40 milliGRAM(s) Oral before breakfast    PRN Inpatient Medications  cloNIDine 0.1 milliGRAM(s) Oral two times a day PRN  hydrOXYzine hydrochloride 50 milliGRAM(s) Oral every 8 hours PRN  oxyCODONE    5 mG/acetaminophen 325 mG 2 Tablet(s) Oral every 6 hours PRN  tiZANidine 4 milliGRAM(s) Oral every 6 hours PRN      REVIEW OF SYSTEMS  --------------------------------------------------------------------------------  General: no fever  CVS: no chest pain  RESP: no sob, no cough  ABD: no abdominal pain  : no dysuria  MSK: no edema     VITALS/PHYSICAL EXAM  --------------------------------------------------------------------------------  T(C): 36.6 (09-04-19 @ 14:20), Max: 36.7 (09-04-19 @ 02:56)  HR: 78 (09-04-19 @ 14:20) (66 - 78)  BP: 153/104 (09-04-19 @ 14:20) (141/91 - 153/104)  RR: 20 (09-04-19 @ 14:20) (18 - 20)  SpO2: 99% (09-04-19 @ 14:20) (99% - 100%)  Wt(kg): --    Physical Exam:  	Gen: NAD  	HEENT: MMM  	Pulm: CTA B/L  	CV: S1S2  	Abd: Soft, +BS  	Ext: No LE edema B/L                      Neuro: Awake   	Skin: Warm and Dry     LABS/STUDIES  --------------------------------------------------------------------------------              13.9   5.30  >-----------<  301      [09-04-19 @ 06:20]              43.7     140  |  105  |  28  ----------------------------<  97      [09-04-19 @ 12:46]  4.6   |  23  |  1.00        Ca     9.8     [09-04-19 @ 12:46]      Mg     2.0     [09-04-19 @ 06:20]      Phos  3.2     [09-04-19 @ 06:20]    TPro  7.5  /  Alb  3.7  /  TBili  0.9  /  DBili  x   /  AST  41  /  ALT  27  /  AlkPhos  58  [09-04-19 @ 06:20]          Creatinine Trend:  SCr 1.00 [09-04 @ 12:46]  SCr 0.95 [09-04 @ 06:20]  SCr 1.02 [08-29 @ 06:00]  SCr 1.10 [08-28 @ 06:00]  SCr 1.11 [08-22 @ 06:00]    Urinalysis - [08-12-19 @ 21:20]      Color YELLOW / Appearance CLEAR / SG 1.033 / pH 5.5      Gluc NEGATIVE / Ketone NEGATIVE  / Bili NEGATIVE / Urobili NORMAL       Blood NEGATIVE / Protein 30 / Leuk Est NEGATIVE / Nitrite NEGATIVE      RBC 0-2 / WBC 3-5 / Hyaline NEGATIVE / Gran  / Sq Epi OCC / Non Sq Epi  / Bacteria NEGATIVE        HIV Nonreactive The HIV Ag/Ab Combo test performed screens for HIV-1 p24  antigen, antibodies to HIV-1 (group M and group O), and  antibodies to HIV-2. All specimens repeatedly reactive  will reflex to an HIV 1/2 antibody confirmation and  differentiation test. This assay detects p24 antigen which  may be present prior to the development of HIV antibodies,  therefore a reactive result with a negative HIV 1/2 AB  Confirmation should be followed up with HIV-1 RNA, HIV-2  RNA and repeat testing in 4-8 weeks. A nonreactive result  does not preclude previous exposure to or infection with  HIV-1 or HIV-2. [facility] prohibits disclosure of this  result to any unauthorized party.      [08-10-19 @ 06:00] Antisocial behavior/cognition (past or present)/Substance abuse/Noncompliance with treatment/Community stressors that increase the risk of destabilization